# Patient Record
Sex: FEMALE | Race: BLACK OR AFRICAN AMERICAN | NOT HISPANIC OR LATINO | Employment: FULL TIME | URBAN - METROPOLITAN AREA
[De-identification: names, ages, dates, MRNs, and addresses within clinical notes are randomized per-mention and may not be internally consistent; named-entity substitution may affect disease eponyms.]

---

## 2023-03-13 DIAGNOSIS — J45.909 ASTHMA, UNSPECIFIED ASTHMA SEVERITY, UNSPECIFIED WHETHER COMPLICATED, UNSPECIFIED WHETHER PERSISTENT (HHS-HCC): Primary | ICD-10-CM

## 2023-03-13 RX ORDER — ALBUTEROL SULFATE 90 UG/1
2 AEROSOL, METERED RESPIRATORY (INHALATION)
COMMUNITY
Start: 2014-05-08 | End: 2023-03-13 | Stop reason: SDUPTHER

## 2023-03-13 RX ORDER — ALBUTEROL SULFATE 90 UG/1
2 AEROSOL, METERED RESPIRATORY (INHALATION)
Qty: 18 G | Refills: 0 | Status: SHIPPED | OUTPATIENT
Start: 2023-03-13 | End: 2023-04-26 | Stop reason: SDUPTHER

## 2023-03-14 DIAGNOSIS — I10 PRIMARY HYPERTENSION: Primary | ICD-10-CM

## 2023-03-14 PROBLEM — K57.92 ACUTE DIVERTICULITIS: Status: ACTIVE | Noted: 2023-03-14

## 2023-03-14 PROBLEM — R92.8 ABNORMAL MAMMOGRAM: Status: ACTIVE | Noted: 2023-03-14

## 2023-03-14 RX ORDER — LOSARTAN POTASSIUM 100 MG/1
100 TABLET ORAL DAILY
COMMUNITY
End: 2023-03-14 | Stop reason: SDUPTHER

## 2023-03-14 RX ORDER — LOSARTAN POTASSIUM 100 MG/1
100 TABLET ORAL DAILY
Qty: 90 TABLET | Refills: 0 | Status: SHIPPED | OUTPATIENT
Start: 2023-03-14 | End: 2023-06-23

## 2023-04-10 PROBLEM — M17.12 PRIMARY LOCALIZED OSTEOARTHROSIS OF LEFT LOWER LEG: Status: ACTIVE | Noted: 2023-04-10

## 2023-04-10 PROBLEM — H04.123 DRY EYES: Status: ACTIVE | Noted: 2023-04-10

## 2023-04-10 PROBLEM — G89.29 CHRONIC PAIN OF BOTH KNEES: Status: ACTIVE | Noted: 2023-04-10

## 2023-04-10 PROBLEM — E66.812 CLASS 2 SEVERE OBESITY WITH BODY MASS INDEX (BMI) OF 35 TO 39.9 WITH SERIOUS COMORBIDITY: Status: ACTIVE | Noted: 2023-04-10
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
## 2023-04-10 PROBLEM — R53.83 FATIGUE: Status: ACTIVE | Noted: 2023-04-10

## 2023-04-10 PROBLEM — K57.31 DIVERTICULOSIS OF LARGE INTESTINE WITH HEMORRHAGE: Status: ACTIVE | Noted: 2023-04-10

## 2023-04-10 PROBLEM — D64.9 ANEMIA: Status: ACTIVE | Noted: 2023-04-10

## 2023-04-10 PROBLEM — S05.02XA LEFT CORNEAL ABRASION: Status: ACTIVE | Noted: 2023-04-10

## 2023-04-10 PROBLEM — K92.2 LOWER GI BLEEDING: Status: ACTIVE | Noted: 2023-04-10

## 2023-04-10 PROBLEM — M25.562 CHRONIC PAIN OF BOTH KNEES: Status: ACTIVE | Noted: 2023-04-10

## 2023-04-10 PROBLEM — M25.561 CHRONIC PAIN OF BOTH KNEES: Status: ACTIVE | Noted: 2023-04-10

## 2023-04-10 PROBLEM — H40.009 GLAUCOMA SUSPECT: Status: ACTIVE | Noted: 2023-04-10
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
## 2023-04-10 PROBLEM — H26.9 CATARACT: Status: ACTIVE | Noted: 2023-04-10

## 2023-04-10 PROBLEM — R52 BODY ACHES: Status: ACTIVE | Noted: 2023-04-10

## 2023-04-10 PROBLEM — R05.9 COUGH: Status: ACTIVE | Noted: 2023-04-10

## 2023-04-10 PROBLEM — J45.909 ASTHMA (HHS-HCC): Status: ACTIVE | Noted: 2023-04-10

## 2023-04-10 PROBLEM — I10 HYPERTENSION: Status: ACTIVE | Noted: 2023-04-10

## 2023-04-10 PROBLEM — E66.01 CLASS 2 SEVERE OBESITY WITH BODY MASS INDEX (BMI) OF 35 TO 39.9 WITH SERIOUS COMORBIDITY (MULTI): Status: ACTIVE | Noted: 2023-04-10

## 2023-04-10 PROBLEM — H26.9 CATARACTS, BOTH EYES: Status: ACTIVE | Noted: 2023-04-10

## 2023-04-10 PROBLEM — E55.9 VITAMIN D DEFICIENCY: Status: ACTIVE | Noted: 2023-04-10

## 2023-04-10 PROBLEM — H18.899: Status: ACTIVE | Noted: 2023-04-10

## 2023-04-10 RX ORDER — IBUPROFEN 800 MG/1
1 TABLET ORAL 2 TIMES DAILY PRN
COMMUNITY
Start: 2022-09-20

## 2023-04-10 RX ORDER — IPRATROPIUM BROMIDE AND ALBUTEROL SULFATE 2.5; .5 MG/3ML; MG/3ML
SOLUTION RESPIRATORY (INHALATION)
COMMUNITY
Start: 2013-06-08 | End: 2023-10-13 | Stop reason: SDUPTHER

## 2023-04-10 RX ORDER — MONTELUKAST SODIUM 10 MG/1
10 TABLET ORAL NIGHTLY
COMMUNITY
Start: 2023-01-11 | End: 2023-10-13 | Stop reason: SDUPTHER

## 2023-04-10 RX ORDER — DEXTROMETHORPHAN HYDROBROMIDE, GUAIFENESIN 5; 100 MG/5ML; MG/5ML
1300 LIQUID ORAL 2 TIMES DAILY PRN
COMMUNITY
Start: 2020-09-15 | End: 2023-11-17 | Stop reason: ALTCHOICE

## 2023-04-10 RX ORDER — BUDESONIDE, GLYCOPYRROLATE, AND FORMOTEROL FUMARATE 160; 9; 4.8 UG/1; UG/1; UG/1
2 AEROSOL, METERED RESPIRATORY (INHALATION) 2 TIMES DAILY
COMMUNITY
Start: 2022-08-23 | End: 2023-04-11 | Stop reason: ALTCHOICE

## 2023-04-10 RX ORDER — AMLODIPINE BESYLATE 5 MG/1
1 TABLET ORAL
COMMUNITY
Start: 2020-07-14 | End: 2023-10-13 | Stop reason: SDUPTHER

## 2023-04-10 RX ORDER — CALCIUM CARBONATE 300MG(750)
400 TABLET,CHEWABLE ORAL AS NEEDED
COMMUNITY
Start: 2023-01-11 | End: 2023-04-11 | Stop reason: ALTCHOICE

## 2023-04-10 RX ORDER — METOPROLOL TARTRATE 50 MG/1
1 TABLET ORAL 2 TIMES DAILY
COMMUNITY
Start: 2019-11-07 | End: 2023-10-13 | Stop reason: SDUPTHER

## 2023-04-10 RX ORDER — LATANOPROST 50 UG/ML
1 SOLUTION/ DROPS OPHTHALMIC NIGHTLY
COMMUNITY
Start: 2018-08-20 | End: 2023-10-09 | Stop reason: SDUPTHER

## 2023-04-10 RX ORDER — MULTIVITAMIN
1 TABLET ORAL
COMMUNITY

## 2023-04-11 ENCOUNTER — OFFICE VISIT (OUTPATIENT)
Dept: PRIMARY CARE | Facility: CLINIC | Age: 68
End: 2023-04-11
Payer: MEDICARE

## 2023-04-11 ENCOUNTER — LAB (OUTPATIENT)
Dept: LAB | Facility: LAB | Age: 68
End: 2023-04-11
Payer: MEDICARE

## 2023-04-11 VITALS
HEIGHT: 64 IN | DIASTOLIC BLOOD PRESSURE: 80 MMHG | WEIGHT: 220 LBS | SYSTOLIC BLOOD PRESSURE: 120 MMHG | BODY MASS INDEX: 37.56 KG/M2 | HEART RATE: 81 BPM | OXYGEN SATURATION: 96 %

## 2023-04-11 DIAGNOSIS — I10 PRIMARY HYPERTENSION: ICD-10-CM

## 2023-04-11 DIAGNOSIS — Z01.419 WOMEN'S ANNUAL ROUTINE GYNECOLOGICAL EXAMINATION: ICD-10-CM

## 2023-04-11 DIAGNOSIS — Z12.31 ENCOUNTER FOR SCREENING MAMMOGRAM FOR BREAST CANCER: ICD-10-CM

## 2023-04-11 DIAGNOSIS — E55.9 VITAMIN D DEFICIENCY: ICD-10-CM

## 2023-04-11 DIAGNOSIS — J45.909 ASTHMA, UNSPECIFIED ASTHMA SEVERITY, UNSPECIFIED WHETHER COMPLICATED, UNSPECIFIED WHETHER PERSISTENT (HHS-HCC): ICD-10-CM

## 2023-04-11 DIAGNOSIS — E66.01 CLASS 2 SEVERE OBESITY WITH BODY MASS INDEX (BMI) OF 35 TO 39.9 WITH SERIOUS COMORBIDITY (MULTI): ICD-10-CM

## 2023-04-11 DIAGNOSIS — Z78.0 POSTMENOPAUSAL STATE: Primary | ICD-10-CM

## 2023-04-11 DIAGNOSIS — R92.8 ABNORMAL MAMMOGRAM OF LEFT BREAST: ICD-10-CM

## 2023-04-11 PROBLEM — R52 BODY ACHES: Status: RESOLVED | Noted: 2023-04-10 | Resolved: 2023-04-11
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
## 2023-04-11 PROBLEM — K57.92 ACUTE DIVERTICULITIS: Status: RESOLVED | Noted: 2023-03-14 | Resolved: 2023-04-11

## 2023-04-11 PROBLEM — K92.2 LOWER GI BLEEDING: Status: RESOLVED | Noted: 2023-04-10 | Resolved: 2023-04-11

## 2023-04-11 LAB
ALANINE AMINOTRANSFERASE (SGPT) (U/L) IN SER/PLAS: 14 U/L (ref 7–45)
ALBUMIN (G/DL) IN SER/PLAS: 3.9 G/DL (ref 3.4–5)
ALKALINE PHOSPHATASE (U/L) IN SER/PLAS: 83 U/L (ref 33–136)
ANION GAP IN SER/PLAS: 12 MMOL/L (ref 10–20)
ASPARTATE AMINOTRANSFERASE (SGOT) (U/L) IN SER/PLAS: 17 U/L (ref 9–39)
BILIRUBIN TOTAL (MG/DL) IN SER/PLAS: 0.4 MG/DL (ref 0–1.2)
CALCIDIOL (25 OH VITAMIN D3) (NG/ML) IN SER/PLAS: 23 NG/ML
CALCIUM (MG/DL) IN SER/PLAS: 9.8 MG/DL (ref 8.6–10.6)
CARBON DIOXIDE, TOTAL (MMOL/L) IN SER/PLAS: 31 MMOL/L (ref 21–32)
CHLORIDE (MMOL/L) IN SER/PLAS: 107 MMOL/L (ref 98–107)
CHOLESTEROL (MG/DL) IN SER/PLAS: 188 MG/DL (ref 0–199)
CHOLESTEROL IN HDL (MG/DL) IN SER/PLAS: 67.9 MG/DL
CHOLESTEROL/HDL RATIO: 2.8
CREATININE (MG/DL) IN SER/PLAS: 0.72 MG/DL (ref 0.5–1.05)
GFR FEMALE: >90 ML/MIN/1.73M2
GLUCOSE (MG/DL) IN SER/PLAS: 105 MG/DL (ref 74–99)
LDL: 107 MG/DL (ref 0–99)
POTASSIUM (MMOL/L) IN SER/PLAS: 4.7 MMOL/L (ref 3.5–5.3)
PROTEIN TOTAL: 6.7 G/DL (ref 6.4–8.2)
SODIUM (MMOL/L) IN SER/PLAS: 145 MMOL/L (ref 136–145)
TRIGLYCERIDE (MG/DL) IN SER/PLAS: 65 MG/DL (ref 0–149)
UREA NITROGEN (MG/DL) IN SER/PLAS: 15 MG/DL (ref 6–23)
VLDL: 13 MG/DL (ref 0–40)

## 2023-04-11 PROCEDURE — 80061 LIPID PANEL: CPT

## 2023-04-11 PROCEDURE — 1036F TOBACCO NON-USER: CPT | Performed by: PHYSICIAN ASSISTANT

## 2023-04-11 PROCEDURE — 1160F RVW MEDS BY RX/DR IN RCRD: CPT | Performed by: PHYSICIAN ASSISTANT

## 2023-04-11 PROCEDURE — 82306 VITAMIN D 25 HYDROXY: CPT

## 2023-04-11 PROCEDURE — 3074F SYST BP LT 130 MM HG: CPT | Performed by: PHYSICIAN ASSISTANT

## 2023-04-11 PROCEDURE — 3079F DIAST BP 80-89 MM HG: CPT | Performed by: PHYSICIAN ASSISTANT

## 2023-04-11 PROCEDURE — 99213 OFFICE O/P EST LOW 20 MIN: CPT | Performed by: PHYSICIAN ASSISTANT

## 2023-04-11 PROCEDURE — 1159F MED LIST DOCD IN RCRD: CPT | Performed by: PHYSICIAN ASSISTANT

## 2023-04-11 PROCEDURE — 80053 COMPREHEN METABOLIC PANEL: CPT

## 2023-04-11 PROCEDURE — 36415 COLL VENOUS BLD VENIPUNCTURE: CPT

## 2023-04-11 RX ORDER — FLUTICASONE FUROATE, UMECLIDINIUM BROMIDE AND VILANTEROL TRIFENATATE 100; 62.5; 25 UG/1; UG/1; UG/1
1 POWDER RESPIRATORY (INHALATION) DAILY
Qty: 60 EACH | Refills: 0 | Status: SHIPPED | OUTPATIENT
Start: 2023-04-11 | End: 2023-05-04

## 2023-04-11 RX ORDER — SPIRONOLACTONE 25 MG
1 TABLET ORAL
COMMUNITY
Start: 2022-02-28

## 2023-04-11 ASSESSMENT — PATIENT HEALTH QUESTIONNAIRE - PHQ9
1. LITTLE INTEREST OR PLEASURE IN DOING THINGS: NOT AT ALL
SUM OF ALL RESPONSES TO PHQ9 QUESTIONS 1 AND 2: 0
2. FEELING DOWN, DEPRESSED OR HOPELESS: NOT AT ALL

## 2023-04-11 NOTE — PROGRESS NOTES
"Subjective   Betty Mendieta is a 67 y.o. female who presents for Follow-up (Needs referral for Mamm/PAP).  HPI  67-year-old female presenting for follow-up.  Overall doing well.    HTN: On losartan 100 mg, metoprolol tartrate 50 mg, amlodipine 5 mg.  She does not check BP at home.  Denies any headache, dizziness, chest pain, SOB/GRAY, LE edema    Asthma: On ipratropium-albuterol nebulizer solution, albuterol inhaler as needed, Breztri inhaler 2 puffs twice daily, montelukast 10 mg daily. She is doing nebulizer 3x daily currently. Denies any current wheezing, cough, sputum production, SOB/GRAY.    Health maintenance:  Immunizations:  -Pneumococcal: UTD  -Shingrix: Recommended, obtain from pharmacy due  Mammogram due (last 1/20/2022, was advised to have 6-month follow-up however does not appear it was ever completed)-ordered annual screening and left breast diagnostic + US  Colon CA screening UTD (last 1/21/2020) -10 years  DEXA due (none prior)- ordered 4/11/23  PAP-not indicated due to age, referred to GYN for annual exam  CT cardiac scoring due (none prior)- ordered 4/11/23    Last MCR / CPE: 9/20/2022 (MCR ADV)    /80   Pulse 81   Ht 1.626 m (5' 4\")   Wt 99.8 kg (220 lb)   SpO2 96%   BMI 37.76 kg/m²   Objective   Physical Exam  Vitals reviewed.   Constitutional:       General: She is not in acute distress.     Appearance: Normal appearance. She is not ill-appearing.   HENT:      Head: Normocephalic and atraumatic.   Eyes:      General: No scleral icterus.     Extraocular Movements: Extraocular movements intact.      Conjunctiva/sclera: Conjunctivae normal.      Pupils: Pupils are equal, round, and reactive to light.   Cardiovascular:      Rate and Rhythm: Normal rate and regular rhythm.      Heart sounds: Normal heart sounds. No murmur heard.     No friction rub. No gallop.   Pulmonary:      Effort: Pulmonary effort is normal. No respiratory distress.      Breath sounds: Normal breath sounds. No stridor. " No wheezing, rhonchi or rales.   Musculoskeletal:      Cervical back: Normal range of motion.      Right lower leg: No edema.      Left lower leg: No edema.   Skin:     General: Skin is warm and dry.   Neurological:      Mental Status: She is alert and oriented to person, place, and time. Mental status is at baseline.      Cranial Nerves: No cranial nerve deficit.      Gait: Gait normal.   Psychiatric:         Mood and Affect: Mood normal.         Behavior: Behavior normal.         Assessment/Plan   Problem List Items Addressed This Visit       Asthma     Uncontrolled on DuoNeb nebulizer solution and albuterol inhaler as needed.  She was previously given samples of Breztri by last PCP, however prescription was not covered by insurance.  We will try to get Trelegy covered by insurance.  If not successful, recommend referral to pulmonary medicine         Relevant Medications    fluticasone-umeclidin-vilanter (Trelegy Ellipta) 100-62.5-25 mcg blister with device    Hypertension     Well-controlled.  Continue Amlodipine 5mg, metoprolol tartrate 50mg, Losartan 100mg. Follow strict DASH diet. Exercise for 30 mins daily, as tolerated. CT cardiac scoring ordered          Relevant Orders    CT cardiac scoring wo IV contrast    Comprehensive metabolic panel    Lipid panel    Vitamin D deficiency    Relevant Orders    Vitamin D 25-Hydroxy,Total    Class 2 severe obesity with body mass index (BMI) of 35 to 39.9 with serious comorbidity (CMS/HCC)     Recommended heart healthy, mediterranean style diet. Exercise for at least 30 mins daily, as tolerated. Can refer to comprehensive weight loss clinic if interested.           Other Visit Diagnoses       Postmenopausal state    -  Primary    Relevant Orders    XR DEXA bone density    Referral to Gynecology    Women's annual routine gynecological examination        Relevant Orders    Referral to Gynecology    Encounter for screening mammogram for breast cancer        Relevant Orders     BI mammo bilateral screening tomosynthesis    Abnormal mammogram of left breast        Relevant Orders    BI US breast limited left    BI mammo left diagnostic

## 2023-04-11 NOTE — ASSESSMENT & PLAN NOTE
Well-controlled.  Continue Amlodipine 5mg, metoprolol tartrate 50mg, Losartan 100mg. Follow strict DASH diet. Exercise for 30 mins daily, as tolerated. CT cardiac scoring ordered

## 2023-04-11 NOTE — ASSESSMENT & PLAN NOTE
Recommended heart healthy, mediterranean style diet. Exercise for at least 30 mins daily, as tolerated. Can refer to comprehensive weight loss clinic if interested.

## 2023-04-11 NOTE — ASSESSMENT & PLAN NOTE
Uncontrolled on DuoNeb nebulizer solution and albuterol inhaler as needed.  She was previously given samples of Breztri by last PCP, however prescription was not covered by insurance.  We will try to get Trelegy covered by insurance.  If not successful, recommend referral to pulmonary medicine

## 2023-04-13 ENCOUNTER — TELEPHONE (OUTPATIENT)
Dept: PRIMARY CARE | Facility: CLINIC | Age: 68
End: 2023-04-13
Payer: MEDICARE

## 2023-04-13 NOTE — RESULT ENCOUNTER NOTE
Lab results are back.    Lipid panel shows elevated LDL (bad cholesterol.  Our goal is <100 and her level was 107.  Cut back on saturated fats and carbohydrates.  Increase exercise level as tolerated.    Vitamin D level slightly low.  Begin taking over-the-counter vitamin D3 2000 units daily with food.    Electrolytes, kidney function, liver function all look good

## 2023-04-13 NOTE — TELEPHONE ENCOUNTER
Pt informed of results.    ----- Message from Dariana Laird PA-C sent at 4/12/2023  9:44 PM EDT -----  Lab results are back.    Lipid panel shows elevated LDL (bad cholesterol.  Our goal is <100 and her level was 107.  Cut back on saturated fats and carbohydrates.  Increase exercise level as tolerated.    Vitamin D level slightly low.  Begin taking over-the-counter vitamin D3 2000 units daily with food.    Electrolytes, kidney function, liver function all look good

## 2023-04-26 DIAGNOSIS — J45.909 ASTHMA, UNSPECIFIED ASTHMA SEVERITY, UNSPECIFIED WHETHER COMPLICATED, UNSPECIFIED WHETHER PERSISTENT (HHS-HCC): ICD-10-CM

## 2023-04-26 RX ORDER — ALBUTEROL SULFATE 90 UG/1
2 AEROSOL, METERED RESPIRATORY (INHALATION)
Qty: 18 G | Refills: 3 | Status: SHIPPED | OUTPATIENT
Start: 2023-04-26 | End: 2023-10-13 | Stop reason: SDUPTHER

## 2023-05-03 DIAGNOSIS — J45.909 ASTHMA, UNSPECIFIED ASTHMA SEVERITY, UNSPECIFIED WHETHER COMPLICATED, UNSPECIFIED WHETHER PERSISTENT (HHS-HCC): ICD-10-CM

## 2023-05-04 RX ORDER — FLUTICASONE FUROATE, UMECLIDINIUM BROMIDE AND VILANTEROL TRIFENATATE 100; 62.5; 25 UG/1; UG/1; UG/1
POWDER RESPIRATORY (INHALATION)
Qty: 60 EACH | Refills: 0 | Status: SHIPPED | OUTPATIENT
Start: 2023-05-04 | End: 2023-05-08 | Stop reason: SDUPTHER

## 2023-05-08 DIAGNOSIS — J45.909 ASTHMA, UNSPECIFIED ASTHMA SEVERITY, UNSPECIFIED WHETHER COMPLICATED, UNSPECIFIED WHETHER PERSISTENT (HHS-HCC): ICD-10-CM

## 2023-05-08 RX ORDER — FLUTICASONE FUROATE, UMECLIDINIUM BROMIDE AND VILANTEROL TRIFENATATE 100; 62.5; 25 UG/1; UG/1; UG/1
1 POWDER RESPIRATORY (INHALATION) DAILY
Qty: 60 EACH | Refills: 0 | Status: SHIPPED | OUTPATIENT
Start: 2023-05-08 | End: 2023-06-26 | Stop reason: SDUPTHER

## 2023-06-22 DIAGNOSIS — I10 PRIMARY HYPERTENSION: ICD-10-CM

## 2023-06-23 RX ORDER — LOSARTAN POTASSIUM 100 MG/1
TABLET ORAL
Qty: 90 TABLET | Refills: 1 | Status: SHIPPED | OUTPATIENT
Start: 2023-06-23 | End: 2023-10-13 | Stop reason: SDUPTHER

## 2023-06-26 DIAGNOSIS — J45.909 ASTHMA, UNSPECIFIED ASTHMA SEVERITY, UNSPECIFIED WHETHER COMPLICATED, UNSPECIFIED WHETHER PERSISTENT (HHS-HCC): ICD-10-CM

## 2023-06-26 RX ORDER — FLUTICASONE FUROATE, UMECLIDINIUM BROMIDE AND VILANTEROL TRIFENATATE 100; 62.5; 25 UG/1; UG/1; UG/1
1 POWDER RESPIRATORY (INHALATION) DAILY
Qty: 60 EACH | Refills: 3 | Status: SHIPPED | OUTPATIENT
Start: 2023-06-26 | End: 2023-08-28 | Stop reason: ALTCHOICE

## 2023-08-28 ENCOUNTER — OFFICE VISIT (OUTPATIENT)
Dept: PRIMARY CARE | Facility: CLINIC | Age: 68
End: 2023-08-28
Payer: MEDICARE

## 2023-08-28 VITALS
OXYGEN SATURATION: 96 % | BODY MASS INDEX: 37.76 KG/M2 | SYSTOLIC BLOOD PRESSURE: 121 MMHG | HEART RATE: 77 BPM | DIASTOLIC BLOOD PRESSURE: 71 MMHG | WEIGHT: 220 LBS

## 2023-08-28 DIAGNOSIS — J45.909 ASTHMA, UNSPECIFIED ASTHMA SEVERITY, UNSPECIFIED WHETHER COMPLICATED, UNSPECIFIED WHETHER PERSISTENT (HHS-HCC): Primary | ICD-10-CM

## 2023-08-28 PROBLEM — R52 BODY ACHES: Status: ACTIVE | Noted: 2023-08-28
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
## 2023-08-28 PROBLEM — M17.12 PRIMARY OSTEOARTHRITIS OF LEFT KNEE: Status: ACTIVE | Noted: 2021-11-22

## 2023-08-28 PROBLEM — K21.9 GASTROESOPHAGEAL REFLUX DISEASE: Status: ACTIVE | Noted: 2022-02-10

## 2023-08-28 PROBLEM — J45.20 MILD INTERMITTENT ASTHMA WITHOUT COMPLICATION (HHS-HCC): Chronic | Status: ACTIVE | Noted: 2020-03-05

## 2023-08-28 PROBLEM — H52.03 HYPEROPIA OF BOTH EYES: Status: ACTIVE | Noted: 2023-08-28

## 2023-08-28 PROBLEM — M25.512 ACUTE PAIN OF LEFT SHOULDER: Status: ACTIVE | Noted: 2023-08-28

## 2023-08-28 PROBLEM — H40.053 BILATERAL OCULAR HYPERTENSION: Status: ACTIVE | Noted: 2023-08-28

## 2023-08-28 PROBLEM — Z86.16 HISTORY OF COVID-19: Status: ACTIVE | Noted: 2022-02-10

## 2023-08-28 PROBLEM — J01.90 ACUTE SINUSITIS: Status: ACTIVE | Noted: 2023-08-28

## 2023-08-28 PROBLEM — H52.4 BILATERAL PRESBYOPIA: Status: ACTIVE | Noted: 2023-08-28

## 2023-08-28 PROBLEM — K92.2 LOWER GI BLEEDING: Status: ACTIVE | Noted: 2023-08-28

## 2023-08-28 PROBLEM — J01.90 ACUTE SINUSITIS: Status: RESOLVED | Noted: 2023-08-28 | Resolved: 2023-08-28

## 2023-08-28 PROBLEM — M25.562 CHRONIC PAIN OF LEFT KNEE: Status: ACTIVE | Noted: 2022-03-29

## 2023-08-28 PROBLEM — G89.29 CHRONIC PAIN OF LEFT KNEE: Status: ACTIVE | Noted: 2022-03-29

## 2023-08-28 PROBLEM — J45.40 MODERATE PERSISTENT ASTHMA WITHOUT COMPLICATION (HHS-HCC): Status: ACTIVE | Noted: 2022-02-10

## 2023-08-28 PROBLEM — K57.92 ACUTE DIVERTICULITIS: Status: ACTIVE | Noted: 2023-08-28

## 2023-08-28 PROBLEM — I10 ESSENTIAL HYPERTENSION: Chronic | Status: ACTIVE | Noted: 2020-03-05
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
## 2023-08-28 PROBLEM — Z96.652 STATUS POST LEFT KNEE REPLACEMENT: Status: ACTIVE | Noted: 2022-04-08

## 2023-08-28 PROBLEM — H25.13 AGE-RELATED NUCLEAR CATARACT OF BOTH EYES: Status: ACTIVE | Noted: 2023-08-28

## 2023-08-28 PROCEDURE — 3078F DIAST BP <80 MM HG: CPT | Performed by: PHYSICIAN ASSISTANT

## 2023-08-28 PROCEDURE — 3074F SYST BP LT 130 MM HG: CPT | Performed by: PHYSICIAN ASSISTANT

## 2023-08-28 PROCEDURE — 1160F RVW MEDS BY RX/DR IN RCRD: CPT | Performed by: PHYSICIAN ASSISTANT

## 2023-08-28 PROCEDURE — 1036F TOBACCO NON-USER: CPT | Performed by: PHYSICIAN ASSISTANT

## 2023-08-28 PROCEDURE — 99213 OFFICE O/P EST LOW 20 MIN: CPT | Performed by: PHYSICIAN ASSISTANT

## 2023-08-28 PROCEDURE — 1159F MED LIST DOCD IN RCRD: CPT | Performed by: PHYSICIAN ASSISTANT

## 2023-08-28 RX ORDER — METAXALONE 800 MG/1
TABLET ORAL
COMMUNITY
End: 2023-10-13 | Stop reason: ALTCHOICE

## 2023-08-28 RX ORDER — BUDESONIDE, GLYCOPYRROLATE, AND FORMOTEROL FUMARATE 160; 9; 4.8 UG/1; UG/1; UG/1
2 AEROSOL, METERED RESPIRATORY (INHALATION) 2 TIMES DAILY
COMMUNITY
Start: 2022-08-23 | End: 2023-08-28

## 2023-08-28 RX ORDER — FLUTICASONE FUROATE, UMECLIDINIUM BROMIDE AND VILANTEROL TRIFENATATE 200; 62.5; 25 UG/1; UG/1; UG/1
1 POWDER RESPIRATORY (INHALATION)
Qty: 60 EACH | Refills: 5 | Status: SHIPPED | OUTPATIENT
Start: 2023-08-28

## 2023-08-28 RX ORDER — PREDNISONE 10 MG/1
TABLET ORAL
Qty: 1 EACH | Refills: 0 | Status: SHIPPED | OUTPATIENT
Start: 2023-08-28 | End: 2023-10-10 | Stop reason: ALTCHOICE

## 2023-08-28 RX ORDER — CALCIUM CARBONATE 300MG(750)
TABLET,CHEWABLE ORAL
COMMUNITY
Start: 2023-01-11 | End: 2023-11-17 | Stop reason: ALTCHOICE

## 2023-08-28 RX ORDER — MOXIFLOXACIN 5 MG/ML
1 SOLUTION/ DROPS OPHTHALMIC
COMMUNITY
Start: 2023-06-23 | End: 2023-11-17 | Stop reason: ALTCHOICE

## 2023-08-28 RX ORDER — METOPROLOL SUCCINATE 25 MG/1
1 TABLET, EXTENDED RELEASE ORAL DAILY
COMMUNITY
End: 2023-08-28 | Stop reason: ALTCHOICE

## 2023-08-28 RX ORDER — PREDNISONE 10 MG/1
TABLET ORAL
COMMUNITY
Start: 2023-01-11 | End: 2023-08-28 | Stop reason: ALTCHOICE

## 2023-08-28 NOTE — ASSESSMENT & PLAN NOTE
With acute exacerbation. Begin prednisone 10mg dose pack. Take with food and take as directed. Increase Trelegy to 200-62.5-25mg with hopes of decreasing back to 100-62.5-25mg in the future once controlled. Continue montelukast 10mg, ipratropium-albuterol nebulizer, and albuterol inhaler. Referral to pulmonary medicine placed.

## 2023-08-28 NOTE — PROGRESS NOTES
Subjective   Betty Mendieta is a 68 y.o. female who presents for Asthma.  HPI Betty Mendieta is a 68 y.o. female presenting for an acute visit with c/o asthma exacerbation.   She is currently taking ipratropium-albuterol nebulizer treatments, albuterol and Trelegy 100-62.5-25mg inhalers, and montelukast 10mg. She states her asthma was well controlled, but she missed a few days of the Trelegy and now she has been experiencing wheezing, dry hacking cough, chest tightness, and SOB/GRAY. She is using her nebulizer 3x/day.    12 point ROS reviewed and negative other than as stated in HPI    /71   Pulse 77   Wt 99.8 kg (220 lb)   SpO2 96%   BMI 37.76 kg/m²   Objective   Physical Exam  Vitals reviewed.   Constitutional:       General: She is not in acute distress.     Appearance: Normal appearance. She is not ill-appearing.   HENT:      Head: Normocephalic and atraumatic.   Eyes:      General: No scleral icterus.     Extraocular Movements: Extraocular movements intact.      Conjunctiva/sclera: Conjunctivae normal.      Pupils: Pupils are equal, round, and reactive to light.   Cardiovascular:      Rate and Rhythm: Normal rate and regular rhythm.      Heart sounds: Normal heart sounds. No murmur heard.     No friction rub. No gallop.   Pulmonary:      Effort: Pulmonary effort is normal. No respiratory distress.      Breath sounds: Normal breath sounds. No stridor. No wheezing, rhonchi or rales.   Musculoskeletal:      Cervical back: Normal range of motion.      Right lower leg: No edema.      Left lower leg: No edema.   Skin:     General: Skin is warm and dry.   Neurological:      Mental Status: She is alert and oriented to person, place, and time. Mental status is at baseline.      Cranial Nerves: No cranial nerve deficit.      Gait: Gait normal.   Psychiatric:         Mood and Affect: Mood normal.         Behavior: Behavior normal.         Assessment/Plan   Problem List Items Addressed This Visit       Asthma -  Primary     With acute exacerbation. Begin prednisone 10mg dose pack. Take with food and take as directed. Increase Trelegy to 200-62.5-25mg with hopes of decreasing back to 100-62.5-25mg in the future once controlled. Continue montelukast 10mg, ipratropium-albuterol nebulizer, and albuterol inhaler. Referral to pulmonary medicine placed.          Relevant Medications    fluticasone-umeclidin-vilanter (Trelegy Ellipta) 200-62.5-25 mcg blister with device    predniSONE 10 mg tablets,dose pack    Other Relevant Orders    Referral to Pulmonology        Follow up as scheduled or sooner as needed

## 2023-10-04 DIAGNOSIS — R92.8 ABNORMAL MAMMOGRAM: Primary | ICD-10-CM

## 2023-10-04 NOTE — RESULT ENCOUNTER NOTE
Mammogram is back. The left breast shows a change in the mass that was previously seen. The right breast is concerning due to the nipple discharge. It is recommend that an ultrasound of both breasts be performed, which I will place the order for. They should call to schedule

## 2023-10-05 NOTE — RESULT ENCOUNTER NOTE
DEXA bone density shows osteopenia (weakening of the bones) but not to the extent of osteoporosis. I recommend taking calcium 600mg + vitamin D 400U twice daily. Participate in weightbearing exercises as tolerated

## 2023-10-09 DIAGNOSIS — H40.059 OCULAR HYPERTENSION, UNSPECIFIED LATERALITY: Primary | ICD-10-CM

## 2023-10-10 ENCOUNTER — OFFICE VISIT (OUTPATIENT)
Dept: PRIMARY CARE | Facility: CLINIC | Age: 68
End: 2023-10-10
Payer: MEDICARE

## 2023-10-10 VITALS
WEIGHT: 222 LBS | HEIGHT: 63 IN | DIASTOLIC BLOOD PRESSURE: 75 MMHG | SYSTOLIC BLOOD PRESSURE: 130 MMHG | OXYGEN SATURATION: 95 % | BODY MASS INDEX: 39.34 KG/M2 | HEART RATE: 88 BPM

## 2023-10-10 DIAGNOSIS — I10 PRIMARY HYPERTENSION: ICD-10-CM

## 2023-10-10 DIAGNOSIS — Z00.00 ROUTINE GENERAL MEDICAL EXAMINATION AT A HEALTH CARE FACILITY: ICD-10-CM

## 2023-10-10 DIAGNOSIS — J45.909 ASTHMA, UNSPECIFIED ASTHMA SEVERITY, UNSPECIFIED WHETHER COMPLICATED, UNSPECIFIED WHETHER PERSISTENT (HHS-HCC): ICD-10-CM

## 2023-10-10 DIAGNOSIS — Z00.00 MEDICARE ANNUAL WELLNESS VISIT, SUBSEQUENT: Primary | ICD-10-CM

## 2023-10-10 DIAGNOSIS — Z13.89 ENCOUNTER FOR SCREENING FOR OTHER DISORDER: ICD-10-CM

## 2023-10-10 PROCEDURE — G0444 DEPRESSION SCREEN ANNUAL: HCPCS | Performed by: PHYSICIAN ASSISTANT

## 2023-10-10 PROCEDURE — 1160F RVW MEDS BY RX/DR IN RCRD: CPT | Performed by: PHYSICIAN ASSISTANT

## 2023-10-10 PROCEDURE — 3078F DIAST BP <80 MM HG: CPT | Performed by: PHYSICIAN ASSISTANT

## 2023-10-10 PROCEDURE — 1036F TOBACCO NON-USER: CPT | Performed by: PHYSICIAN ASSISTANT

## 2023-10-10 PROCEDURE — 90677 PCV20 VACCINE IM: CPT | Performed by: PHYSICIAN ASSISTANT

## 2023-10-10 PROCEDURE — 1170F FXNL STATUS ASSESSED: CPT | Performed by: PHYSICIAN ASSISTANT

## 2023-10-10 PROCEDURE — 99397 PER PM REEVAL EST PAT 65+ YR: CPT | Performed by: PHYSICIAN ASSISTANT

## 2023-10-10 PROCEDURE — 1159F MED LIST DOCD IN RCRD: CPT | Performed by: PHYSICIAN ASSISTANT

## 2023-10-10 PROCEDURE — 1033F TOBACCO NONSMOKER NOR 2NDHND: CPT | Performed by: PHYSICIAN ASSISTANT

## 2023-10-10 PROCEDURE — G0008 ADMIN INFLUENZA VIRUS VAC: HCPCS | Performed by: PHYSICIAN ASSISTANT

## 2023-10-10 PROCEDURE — 3075F SYST BP GE 130 - 139MM HG: CPT | Performed by: PHYSICIAN ASSISTANT

## 2023-10-10 PROCEDURE — G0439 PPPS, SUBSEQ VISIT: HCPCS | Performed by: PHYSICIAN ASSISTANT

## 2023-10-10 PROCEDURE — G0009 ADMIN PNEUMOCOCCAL VACCINE: HCPCS | Performed by: PHYSICIAN ASSISTANT

## 2023-10-10 PROCEDURE — 1124F ACP DISCUSS-NO DSCNMKR DOCD: CPT | Performed by: PHYSICIAN ASSISTANT

## 2023-10-10 PROCEDURE — 90662 IIV NO PRSV INCREASED AG IM: CPT | Performed by: PHYSICIAN ASSISTANT

## 2023-10-10 RX ORDER — LATANOPROST 50 UG/ML
1 SOLUTION/ DROPS OPHTHALMIC NIGHTLY
Qty: 2.5 ML | Refills: 3 | Status: SHIPPED | OUTPATIENT
Start: 2023-10-10 | End: 2024-03-11

## 2023-10-10 ASSESSMENT — ACTIVITIES OF DAILY LIVING (ADL)
MANAGING_FINANCES: INDEPENDENT
TAKING_MEDICATION: INDEPENDENT
DRESSING: INDEPENDENT
BATHING: INDEPENDENT
GROCERY_SHOPPING: INDEPENDENT
DOING_HOUSEWORK: INDEPENDENT

## 2023-10-10 ASSESSMENT — ENCOUNTER SYMPTOMS
OCCASIONAL FEELINGS OF UNSTEADINESS: 0
LOSS OF SENSATION IN FEET: 0
DEPRESSION: 0

## 2023-10-10 ASSESSMENT — PATIENT HEALTH QUESTIONNAIRE - PHQ9
SUM OF ALL RESPONSES TO PHQ9 QUESTIONS 1 AND 2: 0
1. LITTLE INTEREST OR PLEASURE IN DOING THINGS: NOT AT ALL
2. FEELING DOWN, DEPRESSED OR HOPELESS: NOT AT ALL

## 2023-10-10 NOTE — PROGRESS NOTES
"Subjective   Reason for Visit: Betty Mendieta is an 68 y.o. female here for a Medicare Wellness visit. Overall doing well.     Past Medical, Surgical, and Family History reviewed and updated in chart.    Reviewed all medications by prescribing practitioner or clinical pharmacist (such as prescriptions, OTCs, herbal therapies and supplements) and documented in the medical record.    HTN: On losartan 100 mg, metoprolol tartrate 50 mg, amlodipine 5 mg.  She does not check BP at home.  Denies any headache, dizziness, chest pain, SOB/GRAY, LE edema     Asthma: compliant with trelegy 200-62.5-25mg, albuterol inhaler as needed, and montelukast 10mg.  Per patient, her asthma has been under much better control since the initiation of the Trelegy.  She has not had any recent exacerbations.    12 point ROS reviewed and negative other than as stated in HPI    Health maintenance:  Immunizations:  -Flu: Obtain high-dose flu today, 10/10/2023  -Pneumococcal: previously received pneumovax, obtain Prevnar 20 today, 10/10/2023  -Shingrix: Recommended from pharmacy  -Tdap: recommended from pharmacy   Mammogram UTD (last 9/26/23) - need US of b/l breasts, ordered 10/4/2023  Colon CA screening UTD (last 1/21/20) - 10yrs  DEXA UTD (last 9/25/23)   PAP - not indicated   CT cardiac scoring UTD (last 4/11/23)  No healthcare POA/living will    Last MCR / CPE: 10/10/23 (MCR ADV)     12 point ROS reviewed and negative other than as stated in HPI    Patient Care Team:  Dariana Laird PA-C as PCP - General (Internal Medicine)  Dariana Laird PA-C as PCP - [employer] PCP     Objective   Vitals:  /75   Pulse 88   Ht 1.6 m (5' 3\")   Wt 101 kg (222 lb)   SpO2 95%   BMI 39.33 kg/m²       Physical Exam  Vitals reviewed.   Constitutional:       General: She is not in acute distress.     Appearance: Normal appearance.   HENT:      Head: Normocephalic and atraumatic.      Right Ear: Tympanic membrane, ear canal and external ear " normal. There is no impacted cerumen.      Left Ear: Tympanic membrane, ear canal and external ear normal. There is no impacted cerumen.      Nose: Nose normal. No congestion or rhinorrhea.      Mouth/Throat:      Mouth: Mucous membranes are moist.      Pharynx: Oropharynx is clear. No oropharyngeal exudate or posterior oropharyngeal erythema.   Eyes:      General: No scleral icterus.        Right eye: No discharge.         Left eye: No discharge.      Extraocular Movements: Extraocular movements intact.      Conjunctiva/sclera: Conjunctivae normal.      Pupils: Pupils are equal, round, and reactive to light.   Cardiovascular:      Rate and Rhythm: Normal rate and regular rhythm.      Heart sounds: Normal heart sounds. No murmur heard.     No friction rub. No gallop.   Pulmonary:      Effort: Pulmonary effort is normal. No respiratory distress.      Breath sounds: Normal breath sounds. No stridor. No wheezing, rhonchi or rales.   Abdominal:      General: Bowel sounds are normal. There is no distension.      Palpations: Abdomen is soft. There is no mass.      Tenderness: There is no abdominal tenderness. There is no right CVA tenderness or left CVA tenderness.   Musculoskeletal:         General: Normal range of motion.      Cervical back: Normal range of motion and neck supple.      Right lower leg: No edema.      Left lower leg: No edema.   Skin:     General: Skin is warm and dry.      Findings: No rash.   Neurological:      General: No focal deficit present.      Mental Status: She is alert and oriented to person, place, and time. Mental status is at baseline.      Cranial Nerves: No cranial nerve deficit.      Gait: Gait normal.   Psychiatric:         Mood and Affect: Mood normal.         Behavior: Behavior normal.         Assessment/Plan   Problem List Items Addressed This Visit       Asthma    Current Assessment & Plan     Continue Trelegy 200-62.5-25mg. Continue montelukast 10mg, ipratropium-albuterol  nebulizer, and albuterol inhaler. Referral to pulmonary medicine placed.          Relevant Medications    montelukast (Singulair) 10 mg tablet    albuterol 90 mcg/actuation inhaler    ipratropium-albuteroL (Duo-Neb) 0.5-2.5 mg/3 mL nebulizer solution    Hypertension    Current Assessment & Plan     Well-controlled.  Continue Amlodipine 5mg, metoprolol tartrate 50mg, and losartan 100mg. Follow strict DASH diet. Exercise for 30 mins daily         Relevant Medications    losartan (Cozaar) 100 mg tablet    amLODIPine (Norvasc) 5 mg tablet    metoprolol tartrate (Lopressor) 50 mg tablet    Medicare annual wellness visit, subsequent - Primary    Current Assessment & Plan     Discussed age-appropriate preventative health measures.          Other Visit Diagnoses       Encounter for screening for other disorder        Routine general medical examination at a health care facility                 Follow up in 6 months or sooner as needed

## 2023-10-13 PROBLEM — K57.92 ACUTE DIVERTICULITIS: Status: RESOLVED | Noted: 2023-08-28 | Resolved: 2023-10-13

## 2023-10-13 PROBLEM — Z00.00 MEDICARE ANNUAL WELLNESS VISIT, SUBSEQUENT: Status: ACTIVE | Noted: 2023-10-13

## 2023-10-13 PROBLEM — K92.2 LOWER GI BLEEDING: Status: RESOLVED | Noted: 2023-08-28 | Resolved: 2023-10-13

## 2023-10-13 PROBLEM — R05.9 COUGH: Status: RESOLVED | Noted: 2023-04-10 | Resolved: 2023-10-13

## 2023-10-13 PROBLEM — M25.562 CHRONIC PAIN OF LEFT KNEE: Status: RESOLVED | Noted: 2022-03-29 | Resolved: 2023-10-13

## 2023-10-13 PROBLEM — I10 ESSENTIAL HYPERTENSION: Chronic | Status: RESOLVED | Noted: 2020-03-05 | Resolved: 2023-10-13

## 2023-10-13 PROBLEM — Z86.16 HISTORY OF COVID-19: Status: RESOLVED | Noted: 2022-02-10 | Resolved: 2023-10-13

## 2023-10-13 PROBLEM — J45.40 MODERATE PERSISTENT ASTHMA WITHOUT COMPLICATION (HHS-HCC): Status: RESOLVED | Noted: 2022-02-10 | Resolved: 2023-10-13

## 2023-10-13 PROBLEM — R52 BODY ACHES: Status: RESOLVED | Noted: 2023-08-28 | Resolved: 2023-10-13

## 2023-10-13 PROBLEM — J45.20 MILD INTERMITTENT ASTHMA WITHOUT COMPLICATION (HHS-HCC): Chronic | Status: RESOLVED | Noted: 2020-03-05 | Resolved: 2023-10-13

## 2023-10-13 PROBLEM — M17.12 PRIMARY LOCALIZED OSTEOARTHROSIS OF LEFT LOWER LEG: Status: RESOLVED | Noted: 2023-04-10 | Resolved: 2023-10-13

## 2023-10-13 PROBLEM — H26.9 CATARACT: Status: RESOLVED | Noted: 2023-04-10 | Resolved: 2023-10-13

## 2023-10-13 PROBLEM — G89.29 CHRONIC PAIN OF LEFT KNEE: Status: RESOLVED | Noted: 2022-03-29 | Resolved: 2023-10-13

## 2023-10-13 RX ORDER — MONTELUKAST SODIUM 10 MG/1
10 TABLET ORAL NIGHTLY
Qty: 90 TABLET | Refills: 1 | Status: SHIPPED | OUTPATIENT
Start: 2023-10-13

## 2023-10-13 RX ORDER — METOPROLOL TARTRATE 50 MG/1
50 TABLET ORAL 2 TIMES DAILY
Qty: 180 TABLET | Refills: 1 | Status: SHIPPED | OUTPATIENT
Start: 2023-10-13

## 2023-10-13 RX ORDER — IPRATROPIUM BROMIDE AND ALBUTEROL SULFATE 2.5; .5 MG/3ML; MG/3ML
3 SOLUTION RESPIRATORY (INHALATION) 2 TIMES DAILY
Qty: 180 ML | Refills: 5 | Status: SHIPPED | OUTPATIENT
Start: 2023-10-13

## 2023-10-13 RX ORDER — LOSARTAN POTASSIUM 100 MG/1
100 TABLET ORAL DAILY
Qty: 90 TABLET | Refills: 1 | Status: SHIPPED | OUTPATIENT
Start: 2023-10-13

## 2023-10-13 RX ORDER — ALBUTEROL SULFATE 90 UG/1
2 AEROSOL, METERED RESPIRATORY (INHALATION)
Qty: 18 G | Refills: 3 | Status: SHIPPED | OUTPATIENT
Start: 2023-10-13

## 2023-10-13 RX ORDER — AMLODIPINE BESYLATE 5 MG/1
5 TABLET ORAL
Qty: 90 TABLET | Refills: 1 | Status: SHIPPED | OUTPATIENT
Start: 2023-10-13 | End: 2024-03-19 | Stop reason: SDUPTHER

## 2023-10-13 NOTE — ASSESSMENT & PLAN NOTE
Well-controlled.  Continue Amlodipine 5mg, metoprolol tartrate 50mg, and losartan 100mg. Follow strict DASH diet. Exercise for 30 mins daily

## 2023-10-13 NOTE — ASSESSMENT & PLAN NOTE
Continue Trelegy 200-62.5-25mg. Continue montelukast 10mg, ipratropium-albuterol nebulizer, and albuterol inhaler. Referral to pulmonary medicine placed.

## 2023-10-16 ENCOUNTER — ANCILLARY PROCEDURE (OUTPATIENT)
Dept: RADIOLOGY | Facility: CLINIC | Age: 68
End: 2023-10-16
Payer: MEDICARE

## 2023-10-16 DIAGNOSIS — R92.8 ABNORMAL MAMMOGRAM: ICD-10-CM

## 2023-10-16 PROCEDURE — 76642 ULTRASOUND BREAST LIMITED: CPT | Mod: BILATERAL PROCEDURE | Performed by: RADIOLOGY

## 2023-10-16 PROCEDURE — 76642 ULTRASOUND BREAST LIMITED: CPT | Mod: 50

## 2023-10-31 PROBLEM — N63.20 MASS OF LEFT BREAST: Status: ACTIVE | Noted: 2023-10-31

## 2023-10-31 NOTE — PROGRESS NOTES
Betty Mendieta female   1955 68 y.o.  [id number]      Chief Complaint  New patient, biopsy consultation.    History Of Present Illness  Betty Mendieta is a very pleasant 68 y.o. AA female seen in the breast center for biopsy consultation. She denies breast surgery. She has a history of a benign right breast core biopsy. She has no family history of breast cancer.     BREAST IMAGING: 10/16/2023 Bilateral ultrasound, indicates BI-RADS Category 4. Left breast 6:00, 5 cm from the nipple the correlate for the mammographic mass is identified. It is a irregular ill-defined hypoechoic mass with internal vascularity which demonstrates areas of increased stiffness on elastography. The mass measures 0.8 x 0.6 x 0.8 cm. The left axilla demonstrates only normal appearing lymph nodes. Ultrasound guided core biopsy recommended. Right breast in the  subareolar region to evaluate the nipple discharge. No suspicious sonographic abnormality is identified. There is a dilated duct which demonstrates no internal echoes or intraductal masses.    REPRODUCTIVE HISTORY: menarche age 14, , first birth age 28, did not breastfeed, OCP's x 5-10 years, natural menopause age 55, no HRT, scattered fibroglandular tissue                                  FAMILY CANCER HISTORY:   None     Review of Systems  Constitutional:  Negative for appetite change, fatigue, fever and unexpected weight change.   HENT:  Negative for ear pain, hearing loss, nosebleeds, sore throat and trouble swallowing.    Eyes:  Negative for discharge, itching and visual disturbance.   Breast: As stated in HPI.  Respiratory:  Negative for cough, chest tightness and shortness of breath.    Cardiovascular:  Negative for chest pain, palpitations and leg swelling.   Gastrointestinal:  Negative for abdominal pain, constipation, diarrhea and nausea.   Endocrine: Negative for cold intolerance and heat intolerance.   Genitourinary:  Negative for dysuria, frequency, hematuria, pelvic  pain and vaginal bleeding.   Musculoskeletal:  Negative for arthralgias, back pain, gait problem, joint swelling and myalgias.   Skin:  Negative for color change and rash.   Allergic/Immunologic: Negative for environmental allergies and food allergies.   Neurological:  Negative for dizziness, tremors, speech difficulty, weakness, numbness and headaches.   Hematological:  Does not bruise/bleed easily.   Psychiatric/Behavioral:  Negative for agitation, dysphoric mood and sleep disturbance. The patient is not nervous/anxious.       Past Medical History  She has a past medical history of Cramp and spasm (2017), Essential (primary) hypertension (10/26/2018), Injury of conjunctiva and corneal abrasion without foreign body, unspecified eye, initial encounter (2020), Ocular hypertension, unspecified eye (2018), Other chest pain (10/26/2018), Other conditions influencing health status (2013), Pain in right shoulder (2018), Pain in unspecified knee (2014), Personal history of diseases of the blood and blood-forming organs and certain disorders involving the immune mechanism (2017), Personal history of other (healed) physical injury and trauma (2020), Personal history of other diseases of the musculoskeletal system and connective tissue (2018), Personal history of other medical treatment (2017), Personal history of other specified conditions (2015), Personal history of urinary (tract) infections (10/29/2015), Personal history of urinary (tract) infections (2015), Preglaucoma, unspecified, unspecified eye, Presence of spectacles and contact lenses, Radiculopathy, lumbar region (2016), and Unilateral primary osteoarthritis, left knee (2021).    Surgical History  She has a past surgical history that includes  section, classic (2014); Tubal ligation (2014); and Breast biopsy (2014).    Family History  Cancer-related family  history is not on file.     Social History  She reports that she has never smoked. She has never used smokeless tobacco. She reports that she does not drink alcohol and does not use drugs.    Allergies  Budesonide-formoterol, Nickel, and Nut - unspecified    Medications  Current Outpatient Medications   Medication Instructions    acetaminophen (TYLENOL 8 HOUR) 1,300 mg, oral, 2 times daily PRN    albuterol 90 mcg/actuation inhaler 2 puffs, inhalation, 4 times daily RT    amLODIPine (NORVASC) 5 mg, oral, Every Day, For blood pressure    APPLE CIDER VINEGAR ORAL 1 tablet, oral, Daily    calcium carbonate-vitamin D3 (Calcium 600 with Vitamin D3) 600 mg-10 mcg (400 unit) chewable tablet 1 tablet, oral, Daily RT    fluticasone-umeclidin-vilanter (Trelegy Ellipta) 200-62.5-25 mcg blister with device 1 puff, inhalation, Daily before breakfast    ibuprofen 800 mg tablet 1 tablet, oral, 2 times daily PRN    ipratropium-albuteroL (Duo-Neb) 0.5-2.5 mg/3 mL nebulizer solution 3 mL, nebulization, 2 times daily    latanoprost (Xalatan) 0.005 % ophthalmic solution 1 drop, Both Eyes, Nightly    losartan (COZAAR) 100 mg, oral, Daily    magnesium oxide (Mag-Ox) 400 mg tablet oral    metoprolol tartrate (LOPRESSOR) 50 mg, oral, 2 times daily    montelukast (SINGULAIR) 10 mg, oral, Nightly    moxifloxacin (Vigamox) 0.5 % ophthalmic solution 1 drop, ophthalmic (eye)    multivitamin tablet 1 tablet, oral, Every Day       Last Recorded Vitals  There were no vitals taken for this visit.    Physical Exam  Patient is alert and oriented x3 and in a relaxed and appropriate mood. Her gait is steady and hand grasps are equal. Sclera is clear. The breasts are nearly symmetrical. The tissue is soft without palpable abnormalities, discrete nodules or masses. The skin and nipples appear normal. There is no cervical, supraclavicular or axillary lymphadenopathy. Heart rate and rhythm normal, S1 and S2 appreciated. The lungs are clear to auscultation  bilaterally. Abdomen is soft and non-tender.      Relevant Results and Imaging  Study Result    Narrative & Impression   Interpreted By:  Jay Linder,   STUDY:  BI US BREAST LIMITED BILATERAL;  10/16/2023 11:12 am      INDICATION:  Callback from screening. Nipple discharge.      COMPARISON:  Mammogram dated 09/26/2023      FINDINGS:  The patient was recalled for a mass in the left breast. Since her  screening mammogram, she has developed right nipple discharge.      Sonographic images were obtained of the right breast in the  subareolar region to evaluate the nipple discharge. No suspicious  sonographic abnormality is identified. There is a dilated duct which  demonstrates no internal echoes or intraductal masses. Color Doppler  imaging demonstrates normal vascularity. Elastography shows soft  tissue.      The left breast was evaluated. At the 6 o'clock position 5 cm from  the nipple the correlate for the mammographic mass is identified. It  is a irregular ill-defined hypoechoic mass with internal vascularity  which demonstrates areas of increased stiffness on elastography. The  mass measures 0.8 x 0.6 x 0.8 cm.      The left axilla demonstrates only normal appearing lymph nodes.      IMPRESSION:  Mass for which the patient was recalled demonstrates both suspicious  features mammographically and sonographically. An ultrasound-guided  biopsy is recommended.      No suspicious findings seen to explain the patient's nipple  discharge. Clinical follow-up is recommended.      The findings and recommendations were discussed with the patient the  time of interpretation. She will be scheduled for surgical  consultation and biopsy. A notification was sent to the ordering  provider.    BI-RADS Category:  4 Suspicious.  Recommendation:  Biopsy.  Recommended Date:  Immediate.  Laterality:  Left      Signed by: Jay Linder 10/16/2023 11:47 AM       Time was spent viewing digital images. I explained the results in depth, along  with suggested explanation for follow up recommendations based on the testing results. BI-RADS Category 4    Visit Diagnosis  1. Abnormal finding on breast imaging        2. Mass of left breast, unspecified quadrant          Assessment/Plan  Abnormal mammogram, left breast mass, no breast surgery, history right benign core biopsy, no family history of breast cancer, scattered fibroglandular tissue    Plan:  Left breast ultrasound guided core biopsy.    Patient Discussion/Summary  I recommend a left breast ultrasound guided core biopsy. A breast radiology physician will perform the procedure. Possible diagnoses include benign, atypia or cancer. Bruising and mild discomfort after the biopsy is normal and will improve. I typically have results in 3-5 business days. I will call you with the results, please have your phone handy to take my call. I will provide recommendations for future follow up based on your biopsy results.     IMPORTANT INFORMATION REGARDING YOUR RESULTS    If you receive medical information from My [facility] Personal Health Record (online chart) your results will be released into your chart. This means you may view or see results of your biopsy or procedure before I contact you directly. If this occurs, please call the office and we will discuss your results over the phone.    You can see your health information, review clinical summaries from office visits & test results online when you follow your health with MY  Chart, a personal health record. To sign up go to [email or web address]. If you need assistance with signing up or trouble getting into your account call [facility] Patient Line 24/7 at [phone].    My office phone number is [phone]  if you need to get in touch with me or have additional questions or concerns. Thank you for choosing [facility] and trusting me as your healthcare provider. I look forward to seeing you again at your next office visit. I am honored to be  a provider on your health care team and I remain dedicated to helping you achieve your health goals.       Terrie Gloria, APRN-CNP

## 2023-11-01 ENCOUNTER — ANCILLARY PROCEDURE (OUTPATIENT)
Dept: RADIOLOGY | Facility: CLINIC | Age: 68
End: 2023-11-01
Payer: MEDICARE

## 2023-11-01 ENCOUNTER — PROCEDURE VISIT (OUTPATIENT)
Dept: SURGICAL ONCOLOGY | Facility: CLINIC | Age: 68
End: 2023-11-01
Payer: MEDICARE

## 2023-11-01 DIAGNOSIS — R92.8 ABNORMAL FINDING ON BREAST IMAGING: Primary | ICD-10-CM

## 2023-11-01 DIAGNOSIS — R92.8 OTHER ABNORMAL AND INCONCLUSIVE FINDINGS ON DIAGNOSTIC IMAGING OF BREAST: ICD-10-CM

## 2023-11-01 DIAGNOSIS — N63.20 MASS OF LEFT BREAST, UNSPECIFIED QUADRANT: ICD-10-CM

## 2023-11-01 PROCEDURE — 77065 DX MAMMO INCL CAD UNI: CPT | Mod: RT

## 2023-11-01 PROCEDURE — 2720000007 HC OR 272 NO HCPCS

## 2023-11-01 PROCEDURE — 88342 IMHCHEM/IMCYTCHM 1ST ANTB: CPT | Performed by: STUDENT IN AN ORGANIZED HEALTH CARE EDUCATION/TRAINING PROGRAM

## 2023-11-01 PROCEDURE — 19083 BX BREAST 1ST LESION US IMAG: CPT | Mod: LEFT SIDE | Performed by: RADIOLOGY

## 2023-11-01 PROCEDURE — 99204 OFFICE O/P NEW MOD 45 MIN: CPT | Performed by: NURSE PRACTITIONER

## 2023-11-01 PROCEDURE — 88342 IMHCHEM/IMCYTCHM 1ST ANTB: CPT | Mod: TC,AHULAB | Performed by: NURSE PRACTITIONER

## 2023-11-01 PROCEDURE — 88342 IMHCHEM/IMCYTCHM 1ST ANTB: CPT | Mod: TC,SUR,AHULAB | Performed by: NURSE PRACTITIONER

## 2023-11-01 PROCEDURE — 88305 TISSUE EXAM BY PATHOLOGIST: CPT | Performed by: STUDENT IN AN ORGANIZED HEALTH CARE EDUCATION/TRAINING PROGRAM

## 2023-11-01 PROCEDURE — 2500000005 HC RX 250 GENERAL PHARMACY W/O HCPCS: Performed by: RADIOLOGY

## 2023-11-01 PROCEDURE — 19083 BX BREAST 1ST LESION US IMAG: CPT | Mod: TC,LT

## 2023-11-01 PROCEDURE — 96372 THER/PROPH/DIAG INJ SC/IM: CPT | Performed by: RADIOLOGY

## 2023-11-01 PROCEDURE — 77065 DX MAMMO INCL CAD UNI: CPT | Mod: LEFT SIDE | Performed by: RADIOLOGY

## 2023-11-01 PROCEDURE — 99214 OFFICE O/P EST MOD 30 MIN: CPT | Performed by: NURSE PRACTITIONER

## 2023-11-01 PROCEDURE — 88305 TISSUE EXAM BY PATHOLOGIST: CPT | Mod: TC,SUR,AHULAB | Performed by: NURSE PRACTITIONER

## 2023-11-01 RX ADMIN — Medication 10 ML: at 09:46

## 2023-11-01 ASSESSMENT — PAIN SCALES - GENERAL
PAINLEVEL_OUTOF10: 0 - NO PAIN

## 2023-11-01 NOTE — DISCHARGE INSTRUCTIONS
AFTER THE TEST  A steri-strip and bandage will be placed over the incision. You may shower after 24 hours. Remove bandage after 24 hours. Remove bandage after the shower. Leave the steri-strips in place to fall off on their own. If after 1 week the steri-strips are still on, you may remove them. Avoid swimming or soaking in tub for 3 days.     You may have mild discomfort at the test site. If needed, you may take Tylenol (Acetaminophen) for pain. Please avoid taking NSAIDs, Motrin, Advil, Aleve, or ibuprofen for 24 hours following the biopsy. After 24 hours you may resume NSAIDSs.     If you take aspirin, Plavix, Coumadin, Xarelto or Eliquis please tell us. If these medications were stopped by your provider, please ask them when to resume.     You may have some tenderness, bruising or slight bleeding at the site. Please apply ice packs to the site for 15 minutes on and 15 minutes off for a 2 hour minimum.     Most people can return to their usual routine after the procedure. Avoid Strenuous activity for 24 hours.     Sleep in a bra the night after your biopsy. Continue to do so for comfort.     Call your doctor if you have any of the following symptoms :  Fever  Increased pain  Increased bleeding  Redness  Increased swelling  Yellowish drainage  Your doctor will get the biopsy results within 5 - 7 days. Call your doctor with any questions.     Patient education brochure and pain/comfort measures have been reviewed.   Phone number provided to contact Breast Center if problems arise.

## 2023-11-13 ENCOUNTER — TELEPHONE (OUTPATIENT)
Dept: SURGERY | Facility: HOSPITAL | Age: 68
End: 2023-11-13
Payer: MEDICARE

## 2023-11-13 NOTE — TELEPHONE ENCOUNTER
"Informed breast biopsy shows breast cancer. She will meet with surgeon and plan for care    Resulted Orders   Surgical Pathology Exam   Result Value Ref Range    Case Report       Surgical Pathology                                Case: [id number]                                  Authorizing Provider:  EDNA Rincon    Collected:           11/01/2023 0948              Ordering Location:     [facility]       Received:            11/01/2023 [facility]                                     Center                                                                       Pathologist:           Josue Frias MD                                                           Specimen:    BREAST CORE BIOPSY LEFT, LEFT BREAST 6:00 5 CM FN                                          FINAL DIAGNOSIS       A.  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  E-cadherin shows membranous pattern of expression in invasive carcinoma.  In this limited sample, the invasive carcinoma measures up to approximately 7 mm in greatest dimension.    ER, TX and HER2 will be reported separately in an addendum.              By the signature on this report, the individual or group listed as making the Final Interpretation/Diagnosis certifies that they have reviewed this case.       Clinical History       ULTRASOUND GUIDED CORE BIOPSY OF LEFT BREAST 6:00 5 CM FN      Gross Description       Received in formalin, labeled with the patient´s name and hospital number and \" left breast 6:00 5 cm from nipple\", are multiple irregular/cylindrical segments of yellow-white fatty soft tissue aggregating to 3.0 x 0.7 x 0.5 cm.The specimen is submitted in toto in 2 cassettes.  [facility]    NOTE:    Ischemia time: Not provided.  This specimen was placed into formalin at: 0948 11/1/2023.      Disclaimer       One or more of the " reagents used to perform assays on this specimen MAY have contained components considered to be analyte specific reagents (ASR's).  ASR's have not been cleared or approved by the U.S. Food and Drug Administration.  These assays were developed and their performance characteristics determined by the Department of Pathology at [facility]. The FDA does not require this test to go through premarket FDA review. This test is used for clinical purposes. It should not be regarded as investigational or for research. This laboratory is certified under the Clinical Laboratory Improvement Amendments (CLIA) as qualified to perform high complexity clinical laboratory testing.  The assays were performed with appropriate positive and negative controls which stained appropriately.         9:37 AM

## 2023-11-14 LAB
LAB AP ASR DISCLAIMER: NORMAL
LABORATORY COMMENT REPORT: NORMAL
PATH REPORT.ADDENDUM SPEC: NORMAL
PATH REPORT.FINAL DX SPEC: NORMAL
PATH REPORT.GROSS SPEC: NORMAL
PATH REPORT.RELEVANT HX SPEC: NORMAL
PATH REPORT.TOTAL CANCER: NORMAL

## 2023-11-14 NOTE — PROGRESS NOTES
Chief Complaint  New left breast cancer      History of Present Illness    Referring Provider: DORA mathew  PCP A Head    67 yo AA female here with new left breast cancer   cT1 cN0 grade 1 IDC, ER/[place] > 95% Her2 neg  Here alone    History:  1) history of right breast excisional biopsy, benign  2) 2021. BL screening mammogram. Scattered FG tissue. Right breast negative. Left focal asymmetry BIRADS 0  3) 2022. Left MG/US. Left asymmetry persists.  Left US.  6:00N5, 5 x 3 x 5 mm mass BIRADS 3. Rec left MG/US 6 months  4) no FU until 2023. Right nipple dc  5) 2023. BL MG scattered FG tissue. Left breast mass with interval change and associated distortion.  BIRADS 0. Rec left US and right subareolar US  6) 10/16/2023. BL US.  Right subareolar US negative. Left 6:00N5, 0.8 x 0.6 x 0.8 cm irregular mass BIRADS 4. Left axillary US negative.   7) 2023. Left breast 6:00N5 biopsy, grade 1 IDC, ER/[place] > 95% Her2 neg Open coil hydromark.       She presents today for further management and surgical discussion.    Ob/gyn history:  Menarche 14  Menopause 55  , age of first delivery 28  5-10 yrs OCPs, no fertility treatments, no HRT    No family history of breast or ovarian cancer.     Review of systems  A comprehensive ROS was taken on the patient intake form and reviewed with the patient. This form is scanned into the electronic medical record.    Constitutional symptoms: Denies generalized fatigue. Denies weight change, fevers/chills, difficulty sleeping   Eyes: Denies double vision, glaucoma, cataracts.  Ear/nose/throat/mouth: Denies hearing changes, sore throat, sinus problems.  Cardiovascular: No chest pain. Denies irregular heartbeat. Denies ankle swelling.  Respiratory: No wheezing, cough, or shortness of breath.  Gastrointestinal: No abdominal pain, No nausea/vomiting. No indigestion/heartburn. No change in bowel habits. No constipation or diarrhea.   Genitourinary: No urinary incontinence. No urinary  frequency. No painful urination.  Musculoskeletal: No bone pain, no muscle pain, no joint pain.   Integumentary: No rash. No masses. No changes in moles. No easy bruising.  Neurological: No headaches. No tremors. No numbness/tingling.  Psychiatric: No anxiety. No depression.  Endocrine: No excessive thirst. Not too hot or too cold. Not tired or fatigued.   Hematological/lymphatic: No swollen glands or blood clotting problems. No bruising.     Physical exam  A chaperone was offered for all portions of the physical exam. The patient declined.     General appearance: appears stated age, alert and oriented x 3  Head: Normocephalic, atraumatic  Eyes: conjunctivae/corneas clear.  Ears: External ears are normal, hearing is grossly intact.  Lungs: normal breathing  Heart: regular   Abdomen: Soft, nontender, nondistended.  Neurologic: grossly normal  Lymph nodes: No cervical, supraclavicular or axillary lymphadenopathy bilaterally    Breast: A comprehensive breast exam was performed in the seated and supine positions. Breasts are symmetrical. Bilateral nipples are everted. There are no skin changes on arm maneuvers. Bra size: 42C   Right: no masses   Left: no masses. Biopsy site clean.    Results  Imaging  All breast imaging personally reviewed by me.  See HPI    Pathology  11/1/2023. Left breast 6:00N5 biopsy, grade 1 IDC, ER/[place] > 95% Her2 neg Open coil hydromark.     Impression:   1) 67 yo AA female here with new left breast cancer   cT1 cN0 grade 1 IDC, ER/ME > 95% Her2 neg  2) Co-morbidities include asthma. Non-smoker  3) No family history of breast or ovarian cancer      Plan:   I had a long discussion with Ms. Krueger today. We reviewed her imaging and work-up to date and the results.  She has a new  left breast cancer.  We reviewed the multidisciplinary treatment of breast cancer.     Her cancer can be effectively treated with lumpectomy.  We reviewed that lumpectomy with radiation treatment was equivalent to  mastectomy in terms of overall survival and distant recurrence, with more than 20 years of follow-up data.  We discussed that lumpectomy with radiation has a slightly higher local recurrence rate, at about 5% in 10 years, versus mastectomy at 1% in 10 years.  The risks of lumpectomy including bleeding, infection, and need for reexcision of margins (approximately 20%), was discussed.  She was told that lumpectomy is usually a day surgery, and the postoperative recovery was discussed. She was informed that decisions regarding lumpectomy versus mastectomy would not impact need for chemotherapy.  We reviewed the need for preoperative mag seed placement as this is a nonpalpable lesion.    We discussed the requirements of radiation treatment, and the short term risks (mild burn, tiredness, swelling or shrinking of breast) and the long term risks (skin changes, angiosarcoma).    She is clinically node negative.  We discussed management of the axilla.  The rationale behind sentinel node biopsy was explained, including the smaller risk of lymphedema.  Additional risks of surgery, including nerve damage, were discussed.     She is an excellent candidate for lumpectomy and this is her preference.  She will be scheduled for surgery.  She will need a preoperative mag seed placement.  She will follow-up about 1 week after surgery to review her pathology report.  She should call the office with any sooner concerns.

## 2023-11-17 ENCOUNTER — OFFICE VISIT (OUTPATIENT)
Dept: SURGICAL ONCOLOGY | Facility: HOSPITAL | Age: 68
End: 2023-11-17
Payer: MEDICARE

## 2023-11-17 VITALS
DIASTOLIC BLOOD PRESSURE: 84 MMHG | TEMPERATURE: 97 F | WEIGHT: 220 LBS | HEART RATE: 71 BPM | BODY MASS INDEX: 38.97 KG/M2 | SYSTOLIC BLOOD PRESSURE: 146 MMHG

## 2023-11-17 DIAGNOSIS — Z17.0 MALIGNANT NEOPLASM OF LOWER-OUTER QUADRANT OF LEFT BREAST OF FEMALE, ESTROGEN RECEPTOR POSITIVE (MULTI): Primary | ICD-10-CM

## 2023-11-17 DIAGNOSIS — C50.512 MALIGNANT NEOPLASM OF LOWER-OUTER QUADRANT OF LEFT BREAST OF FEMALE, ESTROGEN RECEPTOR POSITIVE (MULTI): Primary | ICD-10-CM

## 2023-11-17 PROCEDURE — 99205 OFFICE O/P NEW HI 60 MIN: CPT | Performed by: SURGERY

## 2023-11-17 PROCEDURE — 3077F SYST BP >= 140 MM HG: CPT | Performed by: SURGERY

## 2023-11-17 PROCEDURE — 1159F MED LIST DOCD IN RCRD: CPT | Performed by: SURGERY

## 2023-11-17 PROCEDURE — 1036F TOBACCO NON-USER: CPT | Performed by: SURGERY

## 2023-11-17 PROCEDURE — 1126F AMNT PAIN NOTED NONE PRSNT: CPT | Performed by: SURGERY

## 2023-11-17 PROCEDURE — 3079F DIAST BP 80-89 MM HG: CPT | Performed by: SURGERY

## 2023-11-17 PROCEDURE — 99215 OFFICE O/P EST HI 40 MIN: CPT | Performed by: SURGERY

## 2023-11-17 PROCEDURE — 1160F RVW MEDS BY RX/DR IN RCRD: CPT | Performed by: SURGERY

## 2023-11-17 RX ORDER — SODIUM CHLORIDE 9 MG/ML
100 INJECTION, SOLUTION INTRAVENOUS CONTINUOUS
Status: CANCELLED | OUTPATIENT
Start: 2023-11-17

## 2023-11-17 ASSESSMENT — PAIN SCALES - GENERAL: PAINLEVEL: 0-NO PAIN

## 2023-12-04 ENCOUNTER — TELEPHONE (OUTPATIENT)
Dept: PREADMISSION TESTING | Facility: HOSPITAL | Age: 68
End: 2023-12-04
Payer: MEDICARE

## 2023-12-11 ENCOUNTER — TELEMEDICINE CLINICAL SUPPORT (OUTPATIENT)
Dept: PREADMISSION TESTING | Facility: HOSPITAL | Age: 68
End: 2023-12-11
Payer: MEDICARE

## 2023-12-11 RX ORDER — TETRACYCLINE HCL 500 MG
1 CAPSULE ORAL DAILY
COMMUNITY

## 2023-12-12 ENCOUNTER — ANESTHESIA EVENT (OUTPATIENT)
Dept: OPERATING ROOM | Facility: HOSPITAL | Age: 68
End: 2023-12-12
Payer: MEDICARE

## 2023-12-12 ENCOUNTER — PRE-ADMISSION TESTING (OUTPATIENT)
Dept: PREADMISSION TESTING | Facility: HOSPITAL | Age: 68
End: 2023-12-12
Payer: MEDICARE

## 2023-12-12 ENCOUNTER — ANCILLARY PROCEDURE (OUTPATIENT)
Dept: RADIOLOGY | Facility: CLINIC | Age: 68
End: 2023-12-12
Payer: MEDICARE

## 2023-12-12 ENCOUNTER — LAB (OUTPATIENT)
Dept: LAB | Facility: LAB | Age: 68
End: 2023-12-12
Payer: MEDICARE

## 2023-12-12 VITALS
BODY MASS INDEX: 39.1 KG/M2 | DIASTOLIC BLOOD PRESSURE: 79 MMHG | OXYGEN SATURATION: 97 % | HEART RATE: 73 BPM | WEIGHT: 220.68 LBS | SYSTOLIC BLOOD PRESSURE: 128 MMHG | RESPIRATION RATE: 18 BRPM | HEIGHT: 63 IN | TEMPERATURE: 97.9 F

## 2023-12-12 DIAGNOSIS — Z01.818 PREOP TESTING: ICD-10-CM

## 2023-12-12 DIAGNOSIS — R92.8 OTHER ABNORMAL AND INCONCLUSIVE FINDINGS ON DIAGNOSTIC IMAGING OF BREAST: ICD-10-CM

## 2023-12-12 DIAGNOSIS — R92.8 OTHER ABNORMAL AND INCONCLUSIVE FINDINGS ON DIAGNOSTIC IMAGING OF BREAST: Primary | ICD-10-CM

## 2023-12-12 DIAGNOSIS — Z01.818 PREOP TESTING: Primary | ICD-10-CM

## 2023-12-12 LAB
ANION GAP SERPL CALC-SCNC: 12 MMOL/L (ref 10–20)
ATRIAL RATE: 70 BPM
BASOPHILS # BLD AUTO: 0.1 X10*3/UL (ref 0–0.1)
BASOPHILS NFR BLD AUTO: 1.7 %
BUN SERPL-MCNC: 10 MG/DL (ref 6–23)
CALCIUM SERPL-MCNC: 9.9 MG/DL (ref 8.6–10.3)
CHLORIDE SERPL-SCNC: 104 MMOL/L (ref 98–107)
CO2 SERPL-SCNC: 29 MMOL/L (ref 21–32)
CREAT SERPL-MCNC: 0.76 MG/DL (ref 0.5–1.05)
EOSINOPHIL # BLD AUTO: 0.22 X10*3/UL (ref 0–0.7)
EOSINOPHIL NFR BLD AUTO: 3.7 %
ERYTHROCYTE [DISTWIDTH] IN BLOOD BY AUTOMATED COUNT: 12.9 % (ref 11.5–14.5)
GFR SERPL CREATININE-BSD FRML MDRD: 87 ML/MIN/1.73M*2
GLUCOSE SERPL-MCNC: 79 MG/DL (ref 74–99)
HCT VFR BLD AUTO: 39.6 % (ref 36–46)
HGB BLD-MCNC: 12.3 G/DL (ref 12–16)
IMM GRANULOCYTES # BLD AUTO: 0.02 X10*3/UL (ref 0–0.7)
IMM GRANULOCYTES NFR BLD AUTO: 0.3 % (ref 0–0.9)
LYMPHOCYTES # BLD AUTO: 1.01 X10*3/UL (ref 1.2–4.8)
LYMPHOCYTES NFR BLD AUTO: 17.1 %
MCH RBC QN AUTO: 28.4 PG (ref 26–34)
MCHC RBC AUTO-ENTMCNC: 31.1 G/DL (ref 32–36)
MCV RBC AUTO: 92 FL (ref 80–100)
MONOCYTES # BLD AUTO: 0.52 X10*3/UL (ref 0.1–1)
MONOCYTES NFR BLD AUTO: 8.8 %
NEUTROPHILS # BLD AUTO: 4.02 X10*3/UL (ref 1.2–7.7)
NEUTROPHILS NFR BLD AUTO: 68.4 %
NRBC BLD-RTO: 0 /100 WBCS (ref 0–0)
P AXIS: 55 DEGREES
P OFFSET: 206 MS
P ONSET: 141 MS
PLATELET # BLD AUTO: 487 X10*3/UL (ref 150–450)
POTASSIUM SERPL-SCNC: 4.2 MMOL/L (ref 3.5–5.3)
PR INTERVAL: 164 MS
Q ONSET: 223 MS
QRS COUNT: 12 BEATS
QRS DURATION: 86 MS
QT INTERVAL: 388 MS
QTC CALCULATION(BAZETT): 419 MS
QTC FREDERICIA: 408 MS
R AXIS: 9 DEGREES
RBC # BLD AUTO: 4.33 X10*6/UL (ref 4–5.2)
SODIUM SERPL-SCNC: 141 MMOL/L (ref 136–145)
T AXIS: 8 DEGREES
T OFFSET: 417 MS
VENTRICULAR RATE: 70 BPM
WBC # BLD AUTO: 5.9 X10*3/UL (ref 4.4–11.3)

## 2023-12-12 PROCEDURE — 2500000005 HC RX 250 GENERAL PHARMACY W/O HCPCS: Performed by: RADIOLOGY

## 2023-12-12 PROCEDURE — 96372 THER/PROPH/DIAG INJ SC/IM: CPT | Performed by: RADIOLOGY

## 2023-12-12 PROCEDURE — 80048 BASIC METABOLIC PNL TOTAL CA: CPT

## 2023-12-12 PROCEDURE — 85025 COMPLETE CBC W/AUTO DIFF WBC: CPT

## 2023-12-12 PROCEDURE — A4648 IMPLANTABLE TISSUE MARKER: HCPCS

## 2023-12-12 PROCEDURE — 99204 OFFICE O/P NEW MOD 45 MIN: CPT | Performed by: PHYSICIAN ASSISTANT

## 2023-12-12 PROCEDURE — 77065 DX MAMMO INCL CAD UNI: CPT | Mod: LEFT SIDE | Performed by: RADIOLOGY

## 2023-12-12 PROCEDURE — 36415 COLL VENOUS BLD VENIPUNCTURE: CPT

## 2023-12-12 PROCEDURE — 77065 DX MAMMO INCL CAD UNI: CPT | Mod: LT

## 2023-12-12 PROCEDURE — 19285 PERQ DEV BREAST 1ST US IMAG: CPT | Mod: LT

## 2023-12-12 PROCEDURE — 2780000003 HC OR 278 NO HCPCS

## 2023-12-12 PROCEDURE — 93005 ELECTROCARDIOGRAM TRACING: CPT | Performed by: PHYSICIAN ASSISTANT

## 2023-12-12 PROCEDURE — 19285 PERQ DEV BREAST 1ST US IMAG: CPT | Mod: LEFT SIDE | Performed by: RADIOLOGY

## 2023-12-12 RX ADMIN — Medication 5 ML: at 08:23

## 2023-12-12 ASSESSMENT — ENCOUNTER SYMPTOMS
ABDOMINAL DISTENTION: 0
DIARRHEA: 0
WHEEZING: 0
SHORTNESS OF BREATH: 0
PALPITATIONS: 0
DIFFICULTY URINATING: 0
DYSPNEA AT REST: 0
CHILLS: 0
VOMITING: 0
FEVER: 0
CONFUSION: 0
RHINORRHEA: 0
NUMBNESS: 0
NECK STIFFNESS: 0
HEMOPTYSIS: 0
EYE PAIN: 0
UNEXPECTED WEIGHT CHANGE: 0
BLOOD IN STOOL: 0
DYSPNEA WITH EXERTION: 0
NAUSEA: 0
ARTHRALGIAS: 1
EYE DISCHARGE: 0
DYSURIA: 0
CONSTIPATION: 0
SINUS CONGESTION: 0
EXCESSIVE BLEEDING: 0
BRUISES/BLEEDS EASILY: 0
DOUBLE VISION: 0
NECK PAIN: 0
ABDOMINAL PAIN: 0
COUGH: 0
WEAKNESS: 0
TROUBLE SWALLOWING: 0
VISUAL CHANGE: 0
MYALGIAS: 0
SKIN CHANGES: 0
WOUND: 0
LIMITED RANGE OF MOTION: 0
LIGHT-HEADEDNESS: 0

## 2023-12-12 ASSESSMENT — PAIN SCALES - GENERAL
PAINLEVEL_OUTOF10: 0 - NO PAIN
PAINLEVEL_OUTOF10: 1

## 2023-12-12 NOTE — PREPROCEDURE INSTRUCTIONS
Medication List            Accurate as of December 12, 2023  9:32 AM. Always use your most recent med list.                albuterol 90 mcg/actuation inhaler  Inhale 2 puffs 4 times a day.  Notes to patient: Ok to use morning of surgery if needed     amLODIPine 5 mg tablet  Commonly known as: Norvasc  Take 1 tablet (5 mg) by mouth once every day. For blood pressure  Medication Adjustments for Surgery: Take morning of surgery with sip of water, no other fluids     apple cider vinegar 500 mg tablet  Medication Adjustments for Surgery: Stop 7 days before surgery     Calcium 600 with Vitamin D3 600 mg-10 mcg (400 unit) chewable tablet  Generic drug: calcium carbonate-vitamin D3  Medication Adjustments for Surgery: Continue until night before surgery     ibuprofen 800 mg tablet  Medication Adjustments for Surgery: Stop 7 days before surgery     ipratropium-albuteroL 0.5-2.5 mg/3 mL nebulizer solution  Commonly known as: Duo-Neb  Take 3 mL by nebulization 2 times a day.  Notes to patient: Ok to use morning of surgery if needed     latanoprost 0.005 % ophthalmic solution  Commonly known as: Xalatan  Administer 1 drop into both eyes once daily at bedtime.  Medication Adjustments for Surgery: Take morning of surgery with sip of water, no other fluids     losartan 100 mg tablet  Commonly known as: Cozaar  Take 1 tablet (100 mg) by mouth once daily.  Medication Adjustments for Surgery: Stop 1 day before surgery     MEMORY COMPLEX ORAL  Medication Adjustments for Surgery: Stop 7 days before surgery     metoprolol tartrate 50 mg tablet  Commonly known as: Lopressor  Take 1 tablet by mouth 2 times a day.  Medication Adjustments for Surgery: Take morning of surgery with sip of water, no other fluids     montelukast 10 mg tablet  Commonly known as: Singulair  Take 1 tablet (10 mg) by mouth once daily at bedtime.  Medication Adjustments for Surgery: Take morning of surgery with sip of water, no other fluids     multivitamin  tablet  Medication Adjustments for Surgery: Stop 7 days before surgery     Tano Armstrongta 200-62.5-25 mcg blister with device  Generic drug: fluticasone-umeclidin-vilanter  Inhale 1 puff once daily in the morning. Take before meals.  Notes to patient: Ok to use morning of surgery if needed                          CONTACT SURGEON'S OFFICE IF YOU DEVELOP:  * Fever = 100.4 F   * New respiratory symptoms (e.g. cough, shortness of breath, respiratory distress, sore throat)  * Recent loss of taste or smell  *Flu like symptoms such as headache, fatigue or gastrointestinal symptoms  * You develop any open sores, shingles, burning or painful urination   AND/OR:  * You no longer wish to have the surgery.  * Any other personal circumstances change that may lead to the need to cancel or defer this surgery.  *You were admitted to any hospital within one week of your planned procedure.    SMOKING:  *Quitting smoking can make a huge difference to your health and recovery from surgery.    *If you need help with quitting, call 7-261-QUIT-NOW.    THE DAY BEFORE SURGERY:  *Do not eat any food after midnight the night before surgery.   *You are permitted to drink clear liquids (i.e. water, black coffee, tea, clear broth, apple juice) up to 2 hours before your surgery.    DIABETICS:  If diabetic, nothing by mouth (no solids or liquids) for 8 hours prior to surgery.   Please check fasting blood sugar upon waking up.  If fasting sugar is <80 mg/dl, please drink 100ml/3oz of apple juice no later than 2 hours prior to surgery.      SURGICAL TIME  *You will be contacted between 2 p.m. and 6 p.m. the business day before your surgery with your arrival time.  *If you haven't received a call by 6pm, call [phone].  *Scheduled surgery times may change and you will be notified if this occurs-check your personal voicemail for any updates.    ON THE MORNING OF SURGERY:  *Wear comfortable, loose fitting clothing.   *Do not use moisturizers,  creams, lotions or perfume.  *All jewelry and valuables should be left at home.  *Prosthetic devices such as contact lenses, hearing aids, dentures, eyelash extensions, hairpins and body piercing must be removed before surgery.    BRING WITH YOU:  *Photo ID and insurance card  *Current list of medicines and allergies  *Pacemaker/Defibrillator/Heart stent cards  *CPAP machine and mask  *Slings/splints/crutches  *Copy of your complete Advanced Directive/DHPOA-if applicable  *Neurostimulator implant remote    PARKING AND ARRIVAL:  *Check in at the Main Entrance desk and let them know you are here for surgery.  *You will be directed to the 2nd floor surgical waiting area.    AFTER OUTPATIENT SURGERY:  *A responsible adult MUST accompany you at the time of discharge and stay with you for 24 hours after your surgery.  *You may NOT drive yourself home after surgery.  *You may use a taxi or ride sharing service ([employer], Uber) to return home ONLY if you are accompanied by a friend or family member.  *Instructions for resuming your medications will be provided by your surgeon.

## 2023-12-12 NOTE — DISCHARGE INSTRUCTIONS
0818 Procedural steps explained and patient given opportunity to verbalize concerns and seek clarification.  Post procedure self-care and potential for bruising , hematoma, and pain reviewed.  Patient verbalizes understanding.   0839 Post procedure care reviewed with patient, ok to resume normal activity with no restrictions. Patient verbalized understanding and will follow up surgery as planned.

## 2023-12-12 NOTE — CPM/PAT H&P
[facility]/PAT Evaluation       Name: Betty Mendieta (Betty Mendieta)  /Age: 1955/68 y.o.         Date of Consult: 23    Referring Provider: Dr. Ruby Luis    Surgery, Date, and Length: Left Breast Magseed Partial Mastectomy; [place] Lymph Node Biopsy - Left , 23, 120MIN    Betty Mendieta is a 68 year-old female who presents to the [facility] for perioperative risk assessment prior to surgery.    Patient presents with a primary diagnosis of left breast cancer. 2023 BL MG showed scattered FG tissue. Left breast mass with interval change and associated distortion.  BIRADS 0. Rec left US and right subareolar US    10/16/2023. BL US.  Right subareolar US negative. Left 6:00N5, 0.8 x 0.6 x 0.8 cm irregular mass BIRADS 4. Left axillary US negative.     2023. Left breast 6:00N5 biopsy, grade 1 IDC, ER/NH > 95% Her2 neg Open coil hydromark.     No family history of breast cancer.  No left breast pain or discharge from nipple.        This note was created in part upon personal review of patient's medical records.      Patient is scheduled to have Left Breast Magseed Partial Mastectomy; [place] Lymph Node Biopsy - Left       Pt denies any past history of anesthetic complications such as PONV, awareness, prolonged sedation, dental damage, aspiration, cardiac arrest, difficult intubation, difficult I.V. access or unexpected hospital admissions.  NO malignant hyperthermia and or pseudocholinesterase deficiency.  +history of blood transfusions at time of diverticulitis flare up in 2019    The patient is not a Gnosticism and will accept blood and blood products if medically indicated.   Type and screen NOT sent.     Past Medical History:   Diagnosis Date    Asthma     Breast cancer, left breast (CMS/HCC)     Cataract     Diverticulosis     Essential (primary) hypertension 10/26/2018    Benign essential hypertension    GERD (gastroesophageal reflux disease)     Ocular hypertension, unspecified eye  2018    Ocular hypertension, unspecified laterality    Preglaucoma, unspecified, unspecified eye     Glaucoma suspect    Radiculopathy, lumbar region 2016    Lumbar radiculopathy    Unilateral primary osteoarthritis, left knee 2021    Primary localized osteoarthrosis of left lower leg    Vitamin D deficiency        Past Surgical History:   Procedure Laterality Date    BI US GUIDED BREAST LOCALIZATION LEFT Left 2023    BI [facility] GUIDED BRE[facility] LOC[facility]    BREAST BIOPSY Right     benign    BREAST BIOPSY Left 2023     SECTION, CLASSIC      x 2    TOTAL KNEE ARTHROPLASTY Left     TOTAL KNEE ARTHROPLASTY Right     TUBAL LIGATION      Tubal Ligation       Patient  has no history on file for sexual activity.    Family History   Problem Relation Name Age of Onset    Hypertension Mother      Asthma Father      Hypertension Father      Glaucoma Sister      Hypertension Sister      Stroke Brother       Social History     Socioeconomic History    Marital status: Legally      Spouse name: Not on file    Number of children: Not on file    Years of education: Not on file    Highest education level: Not on file   Occupational History    Not on file   Tobacco Use    Smoking status: Never    Smokeless tobacco: Never   Substance and Sexual Activity    Alcohol use: Never    Drug use: Never    Sexual activity: Not on file   Other Topics Concern    Not on file   Social History Narrative    Not on file     Social Determinants of Health     Financial Resource Strain: Not on file   Food Insecurity: Not on file   Transportation Needs: Not on file   Physical Activity: Not on file   Stress: Not on file   Social Connections: Not on file   Intimate Partner Violence: Not on file   Housing Stability: Not on file        Allergies   Allergen Reactions    Budesonide-Formoterol Shortness of breath, Other and Wheezing     Other reaction: asthma    Nickel Itching      Rash discoloration with cheap jewelry    Nut - Unspecified Swelling     BRAZIL NUTS ONLY       Prior to Admission medications    Medication Sig Start Date End Date Taking? Authorizing Provider   albuterol 90 mcg/actuation inhaler Inhale 2 puffs 4 times a day.  Patient taking differently: Inhale 2 puffs every 4 hours if needed. 10/13/23  Yes Dariana Laird PA-C   amLODIPine (Norvasc) 5 mg tablet Take 1 tablet (5 mg) by mouth once every day. For blood pressure 10/13/23  Yes Dariana Laird PA-C   apple cider vinegar 500 mg tablet Take 1 tablet by mouth once daily.   Yes Historical Provider, MD   calcium carbonate-vitamin D3 (Calcium 600 with Vitamin D3) 600 mg-10 mcg (400 unit) chewable tablet Chew 1 tablet once daily. 2/28/22  Yes Historical Provider, MD   fluticasone-umeclidin-vilanter (Trelegy Ellipta) 200-62.5-25 mcg blister with device Inhale 1 puff once daily in the morning. Take before meals. 8/28/23  Yes Dariana Laird PA-C   ibuprofen 800 mg tablet Take 1 tablet (800 mg) by mouth 2 times a day as needed. 9/20/22  Yes Historical Provider, MD   ipratropium-albuteroL (Duo-Neb) 0.5-2.5 mg/3 mL nebulizer solution Take 3 mL by nebulization 2 times a day. 10/13/23  Yes Daraina Laird PA-C   latanoprost (Xalatan) 0.005 % ophthalmic solution Administer 1 drop into both eyes once daily at bedtime. 10/10/23  Yes Roscoe Jorge, SAMPSON   losartan (Cozaar) 100 mg tablet Take 1 tablet (100 mg) by mouth once daily. 10/13/23  Yes Dariana Laird PA-C   metoprolol tartrate (Lopressor) 50 mg tablet Take 1 tablet by mouth 2 times a day. 10/13/23  Yes Dariana Laird PA-C   montelukast (Singulair) 10 mg tablet Take 1 tablet (10 mg) by mouth once daily at bedtime. 10/13/23  Yes Dariana Laird PA-C   multivitamin tablet Take 1 tablet by mouth once every day.   Yes Historical Provider, MD   vit C/vit E ac/selenium/ginkgo (MEMORY COMPLEX ORAL) Take by mouth.   Yes Historical Provider, MD BOWENS ROS:   Constitutional:    no fever    no chills   no unexpected weight change  Neuro/Psych:    no numbness   no weakness   no light-headedness   no confusion  Eyes:    no discharge   no pain   no vision loss   no diplopia   no visual disturbance  Ears:    no ear pain   no hearing loss   no tinnitus  Nose:    no nasal discharge   no sinus congestion   no epistaxis  Mouth:    no dental issues   no mouth pain   no oral bleeding   no mouth lesions  Throat:    no throat pain   no dysphagia  Neck:    no neck pain   no neck stiffness  Cardio:    Functional 4 Mets. 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[employer]   no chest pain   no palpitations   no peripheral edema   no dyspnea   no GRAY  Respiratory:    no cough   no wheezing   no hemoptysis   no shortness of breath  Endocrine:    no cold intolerance   no heat intolerance  GI:    no abdominal distention   no abdominal pain   no constipation   no diarrhea   no nausea   no vomiting   no blood in stool  :    no difficulty urinating   no dysuria   no oliguria  Musculoskeletal:    arthralgias (b/l hips)   no myalgias   no decreased ROM  Hematologic:    does not bruise/bleed easily   no excessive bleeding   no history of blood transfusion   no blood clots  Skin:   no skin changes   no sores/wound   no rash      Physical Exam  Constitutional:       General: She is not in acute distress.     Appearance: Normal appearance. She is not ill-appearing, toxic-appearing or diaphoretic.   HENT:      Head: Normocephalic and atraumatic.      Nose: Nose normal. No congestion or rhinorrhea.      Mouth/Throat:      Mouth: Mucous membranes are moist.      Pharynx: No posterior oropharyngeal erythema.   Eyes:      Extraocular Movements: Extraocular movements intact.      Conjunctiva/sclera: Conjunctivae normal.   Cardiovascular:      Rate and Rhythm: Normal rate and regular rhythm.      Pulses: Normal pulses.      Heart sounds: Normal heart sounds. No murmur  "heard.     No friction rub. No gallop.   Pulmonary:      Effort: Pulmonary effort is normal. No respiratory distress.      Breath sounds: Normal breath sounds. No stridor. No wheezing or rhonchi.   Abdominal:      General: Bowel sounds are normal. There is no distension.      Palpations: Abdomen is soft. There is no mass.      Tenderness: There is no abdominal tenderness. There is no guarding or rebound.      Hernia: No hernia is present.   Musculoskeletal:         General: No swelling, tenderness, deformity or signs of injury. Normal range of motion.      Cervical back: Normal range of motion and neck supple. No rigidity or tenderness.   Skin:     General: Skin is warm and dry.      Coloration: Skin is not jaundiced or pale.      Findings: No bruising, erythema, lesion or rash.   Neurological:      General: No focal deficit present.      Mental Status: She is alert and oriented to person, place, and time.      Cranial Nerves: No cranial nerve deficit.      Sensory: No sensory deficit.      Motor: No weakness.      Coordination: Coordination normal.   Psychiatric:         Mood and Affect: Mood normal.         Behavior: Behavior normal.          PAT AIRWAY:   Airway:     Mallampati::  II    Neck ROM::  Full   Multiple missing teeth on top and bottom      Visit Vitals  /79   Pulse 73   Temp 36.6 °C (97.9 °F)   Resp 18   Ht 1.6 m (5' 3\")   Wt 100 kg (220 lb 10.9 oz)   SpO2 97%   BMI 39.09 kg/m²   OB Status Postmenopausal   Smoking Status Never   BSA 2.11 m²      DASI Risk Score    No data to display       Caprini DVT Assessment    No data to display       Modified Frailty Index    No data to display       CHADS2 Stroke Risk  Current as of 20 minutes ago        N/A 3 - 100%: High Risk   2 - 3%: Medium Risk   0 - 2%: Low Risk     Last Change: N/A          This score determines the patient's risk of having a stroke if the patient has atrial fibrillation.        This score is not applicable to this patient. " Components are not calculated.          Revised Cardiac Risk Index    No data to display       Apfel Simplified Score    No data to display       Risk Analysis Index Results This Encounter    No data found in the last 1 encounters.       LABS:  Lab Results   Component Value Date    WBC 5.9 12/12/2023    HGB 12.3 12/12/2023    HCT 39.6 12/12/2023    MCV 92 12/12/2023     (H) 12/12/2023      Lab Results   Component Value Date    GLUCOSE 79 12/12/2023    CALCIUM 9.9 12/12/2023     12/12/2023    K 4.2 12/12/2023    CO2 29 12/12/2023     12/12/2023    BUN 10 12/12/2023    CREATININE 0.76 12/12/2023        EKG 12/12/23  NSR  Minimal voltage criteria for LVH, may be normal variant  Borderline EKG  Vent rate = 70 bpm      Assessment and Plan:       Patient is a 68-year-old female scheduled for a Left Breast Magseed Partial Mastectomy; [place] Lymph Node Biopsy - Left  with Dr. Luis on 12/13/23.    Patient has no active cardiac symptoms.   Patient denies any chest pain, tightness, heaviness, pressure, radiating pain, palpitations, irregular heartbeats, lightheadedness, cough, congestion, shortness of breath, GRAY, PND, near syncope, weight loss or gain.     RCRI  0  , 3.9 % Risk of MACE    Cardiac:  HTN  Encouraged lifestyle modifications, low-sodium diet, and increase activity as tolerated.  Monitor BP and follow up with managing physician for readings sustaining >140/90.     Pulmonary:  Asthma - cont inhalers as prescribed including on dos     Hematology:  Thrombocytosis  9/20/22   12/12/23 PLT = 487    Patient instructed to ambulate as soon as possible postoperatively to decrease thromboembolic risk.   Initiate mechanical DVT prophylaxis as soon as possible and initiate chemical prophylaxis when deemed safe from a bleeding standpoint post surgery.     LABS: CBC, BMP and EKG ordered. Lab results reviewed and show thrombocytosis which is stable.     STOP BANG: >50, obese , HTN =3    Caprini:  7    Risk assessment complete.  Patient is scheduled for a intermediate surgical risk procedure.        Preoperative medication instructions were provided and reviewed with the patient.  Any additional testing or evaluation was explained to the patient.  Nothing by mouth instructions were discussed and patient's questions were answered prior to conclusion to this encounter.  Patient verbalized understanding of preoperative instructions given in preadmission testing; discharge instructions available in EMR.    This note was dictated by a speech recognition.  Minor errors may have been detected in a speech recognition.

## 2023-12-12 NOTE — H&P (VIEW-ONLY)
[facility]/PAT Evaluation       Name: Betty Mendieta (Betty Mendieta)  /Age: 1955/68 y.o.         Date of Consult: 23    Referring Provider: Dr. Ruyb Luis    Surgery, Date, and Length: Left Breast Magseed Partial Mastectomy; [place] Lymph Node Biopsy - Left , 23, 120MIN    Betty Mendieta is a 68 year-old female who presents to the [facility] for perioperative risk assessment prior to surgery.    Patient presents with a primary diagnosis of left breast cancer. 2023 BL MG showed scattered FG tissue. Left breast mass with interval change and associated distortion.  BIRADS 0. Rec left US and right subareolar US    10/16/2023. BL US.  Right subareolar US negative. Left 6:00N5, 0.8 x 0.6 x 0.8 cm irregular mass BIRADS 4. Left axillary US negative.     2023. Left breast 6:00N5 biopsy, grade 1 IDC, ER/MA > 95% Her2 neg Open coil hydromark.     No family history of breast cancer.  No left breast pain or discharge from nipple.        This note was created in part upon personal review of patient's medical records.      Patient is scheduled to have Left Breast Magseed Partial Mastectomy; [place] Lymph Node Biopsy - Left       Pt denies any past history of anesthetic complications such as PONV, awareness, prolonged sedation, dental damage, aspiration, cardiac arrest, difficult intubation, difficult I.V. access or unexpected hospital admissions.  NO malignant hyperthermia and or pseudocholinesterase deficiency.  +history of blood transfusions at time of diverticulitis flare up in 2019    The patient is not a Holiness and will accept blood and blood products if medically indicated.   Type and screen NOT sent.     Past Medical History:   Diagnosis Date    Asthma     Breast cancer, left breast (CMS/HCC)     Cataract     Diverticulosis     Essential (primary) hypertension 10/26/2018    Benign essential hypertension    GERD (gastroesophageal reflux disease)     Ocular hypertension, unspecified eye  2018    Ocular hypertension, unspecified laterality    Preglaucoma, unspecified, unspecified eye     Glaucoma suspect    Radiculopathy, lumbar region 2016    Lumbar radiculopathy    Unilateral primary osteoarthritis, left knee 2021    Primary localized osteoarthrosis of left lower leg    Vitamin D deficiency        Past Surgical History:   Procedure Laterality Date    BI US GUIDED BREAST LOCALIZATION LEFT Left 2023    BI [facility] GUIDED BREAST LOCAL[facility]ATION [facility]    BREAST BIOPSY Right     benign    BREAST BIOPSY Left 2023     SECTION, CLASSIC      x 2    TOTAL KNEE ARTHROPLASTY Left     TOTAL KNEE ARTHROPLASTY Right     TUBAL LIGATION      Tubal Ligation       Patient  has no history on file for sexual activity.    Family History   Problem Relation Name Age of Onset    Hypertension Mother      Asthma Father      Hypertension Father      Glaucoma Sister      Hypertension Sister      Stroke Brother       Social History     Socioeconomic History    Marital status: Legally      Spouse name: Not on file    Number of children: Not on file    Years of education: Not on file    Highest education level: Not on file   Occupational History    Not on file   Tobacco Use    Smoking status: Never    Smokeless tobacco: Never   Substance and Sexual Activity    Alcohol use: Never    Drug use: Never    Sexual activity: Not on file   Other Topics Concern    Not on file   Social History Narrative    Not on file     Social Determinants of Health     Financial Resource Strain: Not on file   Food Insecurity: Not on file   Transportation Needs: Not on file   Physical Activity: Not on file   Stress: Not on file   Social Connections: Not on file   Intimate Partner Violence: Not on file   Housing Stability: Not on file        Allergies   Allergen Reactions    Budesonide-Formoterol Shortness of breath, Other and Wheezing     Other reaction: asthma    Nickel Itching      Rash discoloration with cheap jewelry    Nut - Unspecified Swelling     BRAZIL NUTS ONLY       Prior to Admission medications    Medication Sig Start Date End Date Taking? Authorizing Provider   albuterol 90 mcg/actuation inhaler Inhale 2 puffs 4 times a day.  Patient taking differently: Inhale 2 puffs every 4 hours if needed. 10/13/23  Yes Dariana Laird PA-C   amLODIPine (Norvasc) 5 mg tablet Take 1 tablet (5 mg) by mouth once every day. For blood pressure 10/13/23  Yes Dariana Laird PA-C   apple cider vinegar 500 mg tablet Take 1 tablet by mouth once daily.   Yes Historical Provider, MD   calcium carbonate-vitamin D3 (Calcium 600 with Vitamin D3) 600 mg-10 mcg (400 unit) chewable tablet Chew 1 tablet once daily. 2/28/22  Yes Historical Provider, MD   fluticasone-umeclidin-vilanter (Trelegy Ellipta) 200-62.5-25 mcg blister with device Inhale 1 puff once daily in the morning. Take before meals. 8/28/23  Yes Dariana Laird PA-C   ibuprofen 800 mg tablet Take 1 tablet (800 mg) by mouth 2 times a day as needed. 9/20/22  Yes Historical Provider, MD   ipratropium-albuteroL (Duo-Neb) 0.5-2.5 mg/3 mL nebulizer solution Take 3 mL by nebulization 2 times a day. 10/13/23  Yes Dariana Laird PA-C   latanoprost (Xalatan) 0.005 % ophthalmic solution Administer 1 drop into both eyes once daily at bedtime. 10/10/23  Yes Roscoe Jorge, SAMPSON   losartan (Cozaar) 100 mg tablet Take 1 tablet (100 mg) by mouth once daily. 10/13/23  Yes Dariana Laird PA-C   metoprolol tartrate (Lopressor) 50 mg tablet Take 1 tablet by mouth 2 times a day. 10/13/23  Yes Dariana Laird PA-C   montelukast (Singulair) 10 mg tablet Take 1 tablet (10 mg) by mouth once daily at bedtime. 10/13/23  Yes Dariana Laird PA-C   multivitamin tablet Take 1 tablet by mouth once every day.   Yes Historical Provider, MD   vit C/vit E ac/selenium/ginkgo (MEMORY COMPLEX ORAL) Take by mouth.   Yes Historical Provider, MD BOWENS ROS:   Constitutional:    no fever    no chills   no unexpected weight change  Neuro/Psych:    no numbness   no weakness   no light-headedness   no confusion  Eyes:    no discharge   no pain   no vision loss   no diplopia   no visual disturbance  Ears:    no ear pain   no hearing loss   no tinnitus  Nose:    no nasal discharge   no sinus congestion   no epistaxis  Mouth:    no dental issues   no mouth pain   no oral bleeding   no mouth lesions  Throat:    no throat pain   no dysphagia  Neck:    no neck pain   no neck stiffness  Cardio:    Functional 4 Mets. Patient denies SOB walking up 2 flights of stairs   M,W,F walking track at [facility] [employer]; director of food pantry at [employer]   no chest pain   no palpitations   no peripheral edema   no dyspnea   no GRAY  Respiratory:    no cough   no wheezing   no hemoptysis   no shortness of breath  Endocrine:    no cold intolerance   no heat intolerance  GI:    no abdominal distention   no abdominal pain   no constipation   no diarrhea   no nausea   no vomiting   no blood in stool  :    no difficulty urinating   no dysuria   no oliguria  Musculoskeletal:    arthralgias (b/l hips)   no myalgias   no decreased ROM  Hematologic:    does not bruise/bleed easily   no excessive bleeding   no history of blood transfusion   no blood clots  Skin:   no skin changes   no sores/wound   no rash      Physical Exam  Constitutional:       General: She is not in acute distress.     Appearance: Normal appearance. She is not ill-appearing, toxic-appearing or diaphoretic.   HENT:      Head: Normocephalic and atraumatic.      Nose: Nose normal. No congestion or rhinorrhea.      Mouth/Throat:      Mouth: Mucous membranes are moist.      Pharynx: No posterior oropharyngeal erythema.   Eyes:      Extraocular Movements: Extraocular movements intact.      Conjunctiva/sclera: Conjunctivae normal.   Cardiovascular:      Rate and Rhythm: Normal rate and regular rhythm.      Pulses: Normal pulses.      Heart sounds: Normal heart sounds. No murmur  "heard.     No friction rub. No gallop.   Pulmonary:      Effort: Pulmonary effort is normal. No respiratory distress.      Breath sounds: Normal breath sounds. No stridor. No wheezing or rhonchi.   Abdominal:      General: Bowel sounds are normal. There is no distension.      Palpations: Abdomen is soft. There is no mass.      Tenderness: There is no abdominal tenderness. There is no guarding or rebound.      Hernia: No hernia is present.   Musculoskeletal:         General: No swelling, tenderness, deformity or signs of injury. Normal range of motion.      Cervical back: Normal range of motion and neck supple. No rigidity or tenderness.   Skin:     General: Skin is warm and dry.      Coloration: Skin is not jaundiced or pale.      Findings: No bruising, erythema, lesion or rash.   Neurological:      General: No focal deficit present.      Mental Status: She is alert and oriented to person, place, and time.      Cranial Nerves: No cranial nerve deficit.      Sensory: No sensory deficit.      Motor: No weakness.      Coordination: Coordination normal.   Psychiatric:         Mood and Affect: Mood normal.         Behavior: Behavior normal.          PAT AIRWAY:   Airway:     Mallampati::  II    Neck ROM::  Full   Multiple missing teeth on top and bottom      Visit Vitals  /79   Pulse 73   Temp 36.6 °C (97.9 °F)   Resp 18   Ht 1.6 m (5' 3\")   Wt 100 kg (220 lb 10.9 oz)   SpO2 97%   BMI 39.09 kg/m²   OB Status Postmenopausal   Smoking Status Never   BSA 2.11 m²      DASI Risk Score    No data to display       Caprini DVT Assessment    No data to display       Modified Frailty Index    No data to display       CHADS2 Stroke Risk  Current as of 20 minutes ago        N/A 3 - 100%: High Risk   2 - 3%: Medium Risk   0 - 2%: Low Risk     Last Change: N/A          This score determines the patient's risk of having a stroke if the patient has atrial fibrillation.        This score is not applicable to this patient. " Components are not calculated.          Revised Cardiac Risk Index    No data to display       Apfel Simplified Score    No data to display       Risk Analysis Index Results This Encounter    No data found in the last 1 encounters.       LABS:  Lab Results   Component Value Date    WBC 5.9 12/12/2023    HGB 12.3 12/12/2023    HCT 39.6 12/12/2023    MCV 92 12/12/2023     (H) 12/12/2023      Lab Results   Component Value Date    GLUCOSE 79 12/12/2023    CALCIUM 9.9 12/12/2023     12/12/2023    K 4.2 12/12/2023    CO2 29 12/12/2023     12/12/2023    BUN 10 12/12/2023    CREATININE 0.76 12/12/2023        EKG 12/12/23  NSR  Minimal voltage criteria for LVH, may be normal variant  Borderline EKG  Vent rate = 70 bpm      Assessment and Plan:       Patient is a 68-year-old female scheduled for a Left Breast Magseed Partial Mastectomy; [place] Lymph Node Biopsy - Left  with Dr. Luis on 12/13/23.    Patient has no active cardiac symptoms.   Patient denies any chest pain, tightness, heaviness, pressure, radiating pain, palpitations, irregular heartbeats, lightheadedness, cough, congestion, shortness of breath, GRAY, PND, near syncope, weight loss or gain.     RCRI  0  , 3.9 % Risk of MACE    Cardiac:  HTN  Encouraged lifestyle modifications, low-sodium diet, and increase activity as tolerated.  Monitor BP and follow up with managing physician for readings sustaining >140/90.     Pulmonary:  Asthma - cont inhalers as prescribed including on dos     Hematology:  Thrombocytosis  9/20/22   12/12/23 PLT = 487    Patient instructed to ambulate as soon as possible postoperatively to decrease thromboembolic risk.   Initiate mechanical DVT prophylaxis as soon as possible and initiate chemical prophylaxis when deemed safe from a bleeding standpoint post surgery.     LABS: CBC, BMP and EKG ordered. Lab results reviewed and show thrombocytosis which is stable.     STOP BANG: >50, obese , HTN =3    Caprini:  7    Risk assessment complete.  Patient is scheduled for a intermediate surgical risk procedure.        Preoperative medication instructions were provided and reviewed with the patient.  Any additional testing or evaluation was explained to the patient.  Nothing by mouth instructions were discussed and patient's questions were answered prior to conclusion to this encounter.  Patient verbalized understanding of preoperative instructions given in preadmission testing; discharge instructions available in EMR.    This note was dictated by a speech recognition.  Minor errors may have been detected in a speech recognition.

## 2023-12-13 ENCOUNTER — APPOINTMENT (OUTPATIENT)
Dept: RADIOLOGY | Facility: HOSPITAL | Age: 68
End: 2023-12-13
Payer: MEDICARE

## 2023-12-13 ENCOUNTER — HOSPITAL ENCOUNTER (OUTPATIENT)
Facility: HOSPITAL | Age: 68
Setting detail: OUTPATIENT SURGERY
Discharge: HOME | End: 2023-12-13
Attending: SURGERY | Admitting: SURGERY
Payer: MEDICARE

## 2023-12-13 ENCOUNTER — ANCILLARY PROCEDURE (OUTPATIENT)
Dept: RADIOLOGY | Facility: CLINIC | Age: 68
End: 2023-12-13
Payer: MEDICARE

## 2023-12-13 ENCOUNTER — HOSPITAL ENCOUNTER (OUTPATIENT)
Dept: RADIOLOGY | Facility: HOSPITAL | Age: 68
Setting detail: OUTPATIENT SURGERY
End: 2023-12-13
Payer: MEDICARE

## 2023-12-13 ENCOUNTER — HOSPITAL ENCOUNTER (OUTPATIENT)
Dept: RADIOLOGY | Facility: HOSPITAL | Age: 68
Setting detail: OUTPATIENT SURGERY
Discharge: HOME | End: 2023-12-13
Payer: MEDICARE

## 2023-12-13 ENCOUNTER — ANESTHESIA (OUTPATIENT)
Dept: OPERATING ROOM | Facility: HOSPITAL | Age: 68
End: 2023-12-13
Payer: MEDICARE

## 2023-12-13 VITALS
HEART RATE: 71 BPM | TEMPERATURE: 97.3 F | WEIGHT: 220.02 LBS | BODY MASS INDEX: 38.98 KG/M2 | OXYGEN SATURATION: 96 % | SYSTOLIC BLOOD PRESSURE: 134 MMHG | DIASTOLIC BLOOD PRESSURE: 75 MMHG | HEIGHT: 63 IN | RESPIRATION RATE: 12 BRPM

## 2023-12-13 DIAGNOSIS — R92.8 OTHER ABNORMAL AND INCONCLUSIVE FINDINGS ON DIAGNOSTIC IMAGING OF BREAST: ICD-10-CM

## 2023-12-13 DIAGNOSIS — Z17.0 MALIGNANT NEOPLASM OF LOWER-OUTER QUADRANT OF LEFT BREAST OF FEMALE, ESTROGEN RECEPTOR POSITIVE (MULTI): Primary | ICD-10-CM

## 2023-12-13 DIAGNOSIS — Z17.0 MALIGNANT NEOPLASM OF LOWER-OUTER QUADRANT OF LEFT BREAST OF FEMALE, ESTROGEN RECEPTOR POSITIVE (MULTI): ICD-10-CM

## 2023-12-13 DIAGNOSIS — C50.512 MALIGNANT NEOPLASM OF LOWER-OUTER QUADRANT OF LEFT BREAST OF FEMALE, ESTROGEN RECEPTOR POSITIVE (MULTI): Primary | ICD-10-CM

## 2023-12-13 DIAGNOSIS — C50.512 MALIGNANT NEOPLASM OF LOWER-OUTER QUADRANT OF LEFT BREAST OF FEMALE, ESTROGEN RECEPTOR POSITIVE (MULTI): ICD-10-CM

## 2023-12-13 DIAGNOSIS — G89.18 POST-OPERATIVE PAIN: ICD-10-CM

## 2023-12-13 PROCEDURE — 7100000010 HC PHASE TWO TIME - EACH INCREMENTAL 1 MINUTE: Performed by: SURGERY

## 2023-12-13 PROCEDURE — 3430000001 HC RX 343 DIAGNOSTIC RADIOPHARMACEUTICALS: Performed by: SURGERY

## 2023-12-13 PROCEDURE — 2500000004 HC RX 250 GENERAL PHARMACY W/ HCPCS (ALT 636 FOR OP/ED): Performed by: REGISTERED NURSE

## 2023-12-13 PROCEDURE — 96372 THER/PROPH/DIAG INJ SC/IM: CPT | Performed by: SURGERY

## 2023-12-13 PROCEDURE — 38792 RA TRACER ID OF SENTINL NODE: CPT | Performed by: NUCLEAR MEDICINE

## 2023-12-13 PROCEDURE — 3700000001 HC GENERAL ANESTHESIA TIME - INITIAL BASE CHARGE: Performed by: SURGERY

## 2023-12-13 PROCEDURE — 38900 IO MAP OF SENT LYMPH NODE: CPT | Performed by: SURGERY

## 2023-12-13 PROCEDURE — 7100000001 HC RECOVERY ROOM TIME - INITIAL BASE CHARGE: Performed by: SURGERY

## 2023-12-13 PROCEDURE — 88307 TISSUE EXAM BY PATHOLOGIST: CPT | Mod: TC,SUR,AHULAB | Performed by: SURGERY

## 2023-12-13 PROCEDURE — 2500000005 HC RX 250 GENERAL PHARMACY W/O HCPCS: Performed by: SURGERY

## 2023-12-13 PROCEDURE — 19301 PARTIAL MASTECTOMY: CPT | Performed by: SURGERY

## 2023-12-13 PROCEDURE — 7100000009 HC PHASE TWO TIME - INITIAL BASE CHARGE: Performed by: SURGERY

## 2023-12-13 PROCEDURE — 76098 X-RAY EXAM SURGICAL SPECIMEN: CPT | Performed by: RADIOLOGY

## 2023-12-13 PROCEDURE — A38525 PR BX/REMV,LYMPH NODE,DEEP AXILL: Performed by: REGISTERED NURSE

## 2023-12-13 PROCEDURE — 3600000009 HC OR TIME - EACH INCREMENTAL 1 MINUTE - PROCEDURE LEVEL FOUR: Performed by: SURGERY

## 2023-12-13 PROCEDURE — 38525 BIOPSY/REMOVAL LYMPH NODES: CPT | Performed by: SURGERY

## 2023-12-13 PROCEDURE — 3700000002 HC GENERAL ANESTHESIA TIME - EACH INCREMENTAL 1 MINUTE: Performed by: SURGERY

## 2023-12-13 PROCEDURE — 7100000002 HC RECOVERY ROOM TIME - EACH INCREMENTAL 1 MINUTE: Performed by: SURGERY

## 2023-12-13 PROCEDURE — 2500000005 HC RX 250 GENERAL PHARMACY W/O HCPCS: Performed by: REGISTERED NURSE

## 2023-12-13 PROCEDURE — 2500000004 HC RX 250 GENERAL PHARMACY W/ HCPCS (ALT 636 FOR OP/ED): Performed by: ANESTHESIOLOGY

## 2023-12-13 PROCEDURE — A38525 PR BX/REMV,LYMPH NODE,DEEP AXILL: Performed by: ANESTHESIOLOGY

## 2023-12-13 PROCEDURE — 3600000004 HC OR TIME - INITIAL BASE CHARGE - PROCEDURE LEVEL FOUR: Performed by: SURGERY

## 2023-12-13 PROCEDURE — 2500000001 HC RX 250 WO HCPCS SELF ADMINISTERED DRUGS (ALT 637 FOR MEDICARE OP): Performed by: REGISTERED NURSE

## 2023-12-13 PROCEDURE — 2500000004 HC RX 250 GENERAL PHARMACY W/ HCPCS (ALT 636 FOR OP/ED): Performed by: SURGERY

## 2023-12-13 PROCEDURE — 38792 RA TRACER ID OF SENTINL NODE: CPT | Performed by: SURGERY

## 2023-12-13 PROCEDURE — 76098 X-RAY EXAM SURGICAL SPECIMEN: CPT

## 2023-12-13 PROCEDURE — 2500000002 HC RX 250 W HCPCS SELF ADMINISTERED DRUGS (ALT 637 FOR MEDICARE OP, ALT 636 FOR OP/ED): Performed by: PHARMACIST

## 2023-12-13 PROCEDURE — A9520 TC99 TILMANOCEPT DIAG 0.5MCI: HCPCS | Performed by: SURGERY

## 2023-12-13 PROCEDURE — 38792 RA TRACER ID OF SENTINL NODE: CPT

## 2023-12-13 PROCEDURE — 88307 TISSUE EXAM BY PATHOLOGIST: CPT | Performed by: PATHOLOGY

## 2023-12-13 PROCEDURE — 2720000007 HC OR 272 NO HCPCS: Performed by: SURGERY

## 2023-12-13 RX ORDER — HYDROMORPHONE HYDROCHLORIDE 1 MG/ML
INJECTION, SOLUTION INTRAMUSCULAR; INTRAVENOUS; SUBCUTANEOUS AS NEEDED
Status: DISCONTINUED | OUTPATIENT
Start: 2023-12-13 | End: 2023-12-13

## 2023-12-13 RX ORDER — SODIUM CHLORIDE 9 MG/ML
100 INJECTION, SOLUTION INTRAVENOUS CONTINUOUS
Status: DISCONTINUED | OUTPATIENT
Start: 2023-12-13 | End: 2023-12-13 | Stop reason: HOSPADM

## 2023-12-13 RX ORDER — LIDOCAINE HYDROCHLORIDE 20 MG/ML
INJECTION, SOLUTION EPIDURAL; INFILTRATION; INTRACAUDAL; PERINEURAL AS NEEDED
Status: DISCONTINUED | OUTPATIENT
Start: 2023-12-13 | End: 2023-12-13

## 2023-12-13 RX ORDER — CEFAZOLIN 1 G/1
INJECTION, POWDER, FOR SOLUTION INTRAVENOUS AS NEEDED
Status: DISCONTINUED | OUTPATIENT
Start: 2023-12-13 | End: 2023-12-13

## 2023-12-13 RX ORDER — MIDAZOLAM HYDROCHLORIDE 1 MG/ML
INJECTION INTRAMUSCULAR; INTRAVENOUS AS NEEDED
Status: DISCONTINUED | OUTPATIENT
Start: 2023-12-13 | End: 2023-12-13

## 2023-12-13 RX ORDER — DEXAMETHASONE SODIUM PHOSPHATE 4 MG/ML
INJECTION, SOLUTION INTRA-ARTICULAR; INTRALESIONAL; INTRAMUSCULAR; INTRAVENOUS; SOFT TISSUE AS NEEDED
Status: DISCONTINUED | OUTPATIENT
Start: 2023-12-13 | End: 2023-12-13

## 2023-12-13 RX ORDER — ACETAMINOPHEN 325 MG/1
650 TABLET ORAL EVERY 4 HOURS PRN
Status: DISCONTINUED | OUTPATIENT
Start: 2023-12-13 | End: 2023-12-13 | Stop reason: HOSPADM

## 2023-12-13 RX ORDER — LIDOCAINE HYDROCHLORIDE 10 MG/ML
0.1 INJECTION, SOLUTION EPIDURAL; INFILTRATION; INTRACAUDAL; PERINEURAL ONCE
Status: DISCONTINUED | OUTPATIENT
Start: 2023-12-13 | End: 2023-12-13 | Stop reason: HOSPADM

## 2023-12-13 RX ORDER — OXYCODONE HYDROCHLORIDE 5 MG/1
5 TABLET ORAL EVERY 4 HOURS PRN
Status: DISCONTINUED | OUTPATIENT
Start: 2023-12-13 | End: 2023-12-13 | Stop reason: HOSPADM

## 2023-12-13 RX ORDER — ISOSULFAN BLUE 50 MG/5ML
INJECTION, SOLUTION SUBCUTANEOUS AS NEEDED
Status: DISCONTINUED | OUTPATIENT
Start: 2023-12-13 | End: 2023-12-13 | Stop reason: HOSPADM

## 2023-12-13 RX ORDER — SUCCINYLCHOLINE CHLORIDE 20 MG/ML
INJECTION INTRAMUSCULAR; INTRAVENOUS AS NEEDED
Status: DISCONTINUED | OUTPATIENT
Start: 2023-12-13 | End: 2023-12-13

## 2023-12-13 RX ORDER — OXYCODONE HYDROCHLORIDE 5 MG/1
5 TABLET ORAL EVERY 6 HOURS PRN
Qty: 10 TABLET | Refills: 0 | Status: SHIPPED | OUTPATIENT
Start: 2023-12-13 | End: 2023-12-20

## 2023-12-13 RX ORDER — FENTANYL CITRATE 50 UG/ML
12.5 INJECTION, SOLUTION INTRAMUSCULAR; INTRAVENOUS EVERY 5 MIN PRN
Status: DISCONTINUED | OUTPATIENT
Start: 2023-12-13 | End: 2023-12-13 | Stop reason: HOSPADM

## 2023-12-13 RX ORDER — SODIUM CHLORIDE, SODIUM LACTATE, POTASSIUM CHLORIDE, CALCIUM CHLORIDE 600; 310; 30; 20 MG/100ML; MG/100ML; MG/100ML; MG/100ML
100 INJECTION, SOLUTION INTRAVENOUS CONTINUOUS
Status: DISCONTINUED | OUTPATIENT
Start: 2023-12-13 | End: 2023-12-13 | Stop reason: HOSPADM

## 2023-12-13 RX ORDER — SODIUM CHLORIDE, SODIUM LACTATE, POTASSIUM CHLORIDE, CALCIUM CHLORIDE 600; 310; 30; 20 MG/100ML; MG/100ML; MG/100ML; MG/100ML
50 INJECTION, SOLUTION INTRAVENOUS CONTINUOUS
Status: DISCONTINUED | OUTPATIENT
Start: 2023-12-13 | End: 2023-12-13 | Stop reason: HOSPADM

## 2023-12-13 RX ORDER — ONDANSETRON HYDROCHLORIDE 2 MG/ML
4 INJECTION, SOLUTION INTRAVENOUS ONCE AS NEEDED
Status: DISCONTINUED | OUTPATIENT
Start: 2023-12-13 | End: 2023-12-13 | Stop reason: HOSPADM

## 2023-12-13 RX ORDER — ONDANSETRON HYDROCHLORIDE 2 MG/ML
INJECTION, SOLUTION INTRAVENOUS AS NEEDED
Status: DISCONTINUED | OUTPATIENT
Start: 2023-12-13 | End: 2023-12-13

## 2023-12-13 RX ORDER — PROPOFOL 10 MG/ML
INJECTION, EMULSION INTRAVENOUS AS NEEDED
Status: DISCONTINUED | OUTPATIENT
Start: 2023-12-13 | End: 2023-12-13

## 2023-12-13 RX ORDER — LIDOCAINE HYDROCHLORIDE 40 MG/ML
SOLUTION TOPICAL AS NEEDED
Status: DISCONTINUED | OUTPATIENT
Start: 2023-12-13 | End: 2023-12-13

## 2023-12-13 RX ORDER — PHENYLEPHRINE HCL IN 0.9% NACL 1 MG/10 ML
SYRINGE (ML) INTRAVENOUS AS NEEDED
Status: DISCONTINUED | OUTPATIENT
Start: 2023-12-13 | End: 2023-12-13

## 2023-12-13 RX ORDER — FENTANYL CITRATE 50 UG/ML
50 INJECTION, SOLUTION INTRAMUSCULAR; INTRAVENOUS EVERY 5 MIN PRN
Status: DISCONTINUED | OUTPATIENT
Start: 2023-12-13 | End: 2023-12-13 | Stop reason: HOSPADM

## 2023-12-13 RX ORDER — ALBUTEROL SULFATE 0.83 MG/ML
2.5 SOLUTION RESPIRATORY (INHALATION) ONCE AS NEEDED
Status: COMPLETED | OUTPATIENT
Start: 2023-12-13 | End: 2023-12-13

## 2023-12-13 RX ORDER — FENTANYL CITRATE 50 UG/ML
INJECTION, SOLUTION INTRAMUSCULAR; INTRAVENOUS AS NEEDED
Status: DISCONTINUED | OUTPATIENT
Start: 2023-12-13 | End: 2023-12-13

## 2023-12-13 RX ADMIN — Medication 200 MCG: at 11:23

## 2023-12-13 RX ADMIN — TILMANOCEPT 0.8 MILLICURIE: KIT at 10:15

## 2023-12-13 RX ADMIN — PROPOFOL 50 MG: 10 INJECTION, EMULSION INTRAVENOUS at 11:07

## 2023-12-13 RX ADMIN — LIDOCAINE HYDROCHLORIDE 4 ML: 40 SOLUTION TOPICAL at 10:37

## 2023-12-13 RX ADMIN — LIDOCAINE HYDROCHLORIDE 40 MG: 20 INJECTION, SOLUTION EPIDURAL; INFILTRATION; INTRACAUDAL; PERINEURAL at 10:34

## 2023-12-13 RX ADMIN — Medication 200 MCG: at 11:41

## 2023-12-13 RX ADMIN — PROPOFOL 150 MG: 10 INJECTION, EMULSION INTRAVENOUS at 10:34

## 2023-12-13 RX ADMIN — SUCCINYLCHOLINE CHLORIDE 100 MG: 20 INJECTION, SOLUTION INTRAMUSCULAR; INTRAVENOUS at 10:35

## 2023-12-13 RX ADMIN — DEXAMETHASONE SODIUM PHOSPHATE 4 MG: 4 INJECTION, SOLUTION INTRA-ARTICULAR; INTRALESIONAL; INTRAMUSCULAR; INTRAVENOUS; SOFT TISSUE at 10:44

## 2023-12-13 RX ADMIN — MIDAZOLAM HYDROCHLORIDE 2 MG: 1 INJECTION, SOLUTION INTRAMUSCULAR; INTRAVENOUS at 10:25

## 2023-12-13 RX ADMIN — FENTANYL CITRATE 50 MCG: 50 INJECTION, SOLUTION INTRAMUSCULAR; INTRAVENOUS at 10:50

## 2023-12-13 RX ADMIN — ONDANSETRON 4 MG: 2 INJECTION, SOLUTION INTRAMUSCULAR; INTRAVENOUS at 11:31

## 2023-12-13 RX ADMIN — SODIUM CHLORIDE, POTASSIUM CHLORIDE, SODIUM LACTATE AND CALCIUM CHLORIDE: 600; 310; 30; 20 INJECTION, SOLUTION INTRAVENOUS at 10:23

## 2023-12-13 RX ADMIN — ALBUTEROL SULFATE 2.5 MG: 2.5 SOLUTION RESPIRATORY (INHALATION) at 09:06

## 2023-12-13 RX ADMIN — CEFAZOLIN 2 G: 330 INJECTION, POWDER, FOR SOLUTION INTRAMUSCULAR; INTRAVENOUS at 10:44

## 2023-12-13 RX ADMIN — Medication 200 MCG: at 10:56

## 2023-12-13 RX ADMIN — HYDROMORPHONE HYDROCHLORIDE 0.4 MG: 1 INJECTION, SOLUTION INTRAMUSCULAR; INTRAVENOUS; SUBCUTANEOUS at 11:07

## 2023-12-13 RX ADMIN — Medication 200 MCG: at 11:31

## 2023-12-13 RX ADMIN — FENTANYL CITRATE 50 MCG: 50 INJECTION, SOLUTION INTRAMUSCULAR; INTRAVENOUS at 10:34

## 2023-12-13 SDOH — HEALTH STABILITY: MENTAL HEALTH: CURRENT SMOKER: 0

## 2023-12-13 ASSESSMENT — PAIN - FUNCTIONAL ASSESSMENT
PAIN_FUNCTIONAL_ASSESSMENT: 0-10

## 2023-12-13 ASSESSMENT — PAIN SCALES - GENERAL
PAINLEVEL_OUTOF10: 0 - NO PAIN
PAIN_LEVEL: 2
PAINLEVEL_OUTOF10: 0 - NO PAIN

## 2023-12-13 ASSESSMENT — COLUMBIA-SUICIDE SEVERITY RATING SCALE - C-SSRS
1. IN THE PAST MONTH, HAVE YOU WISHED YOU WERE DEAD OR WISHED YOU COULD GO TO SLEEP AND NOT WAKE UP?: NO
6. HAVE YOU EVER DONE ANYTHING, STARTED TO DO ANYTHING, OR PREPARED TO DO ANYTHING TO END YOUR LIFE?: NO
2. HAVE YOU ACTUALLY HAD ANY THOUGHTS OF KILLING YOURSELF?: NO

## 2023-12-13 NOTE — POST-PROCEDURE NOTE
1350 Nurse help pt to get dressed this time.  1405 Discharge instruction reviewed with pt and family by nurse  1412 IV removed

## 2023-12-13 NOTE — ANESTHESIA PREPROCEDURE EVALUATION
Patient: Betty Mendieta    Procedure Information       Date/Time: 2330    Procedure: Left Breast Magseed Partial Mastectomy; [place] Lymph Node Biopsy (Left)    Location: [facility] A OR 03 / [facility] A OR    Surgeons: Ruby Luis MD            Relevant Problems   Anesthesia  Past Surgical History:  2023: BI US GUIDED BREAST LOCALIZATION LEFT; Left      Comment:  BI US GUIDED BREAST LOCALIZATION LEFT 2023 Donna Ramirez MD [facility]  No date: BREAST BIOPSY; Right      Comment:  benign  2023: BREAST BIOPSY; Left  No date:  SECTION, CLASSIC      Comment:  x 2  No date: TOTAL KNEE ARTHROPLASTY; Left  No date: TOTAL KNEE ARTHROPLASTY; Right  No date: TUBAL LIGATION      Comment:  Tubal Ligation     (+) Left corneal abrasion      Cardiovascular   (+) Hypertension      Endocrine   (+) Class 2 severe obesity with body mass index (BMI) of 35 to 39.9 with serious comorbidity (CMS/HCC)      GI   (+) Diverticulosis of large intestine with hemorrhage   (+) Gastroesophageal reflux disease      Pulmonary   (+) Asthma      Hematology   (+) Anemia      Musculoskeletal   (+) Primary osteoarthritis of left knee      Other  Past Medical History:  No date: Asthma  No date: Breast cancer, left breast (CMS/HCC)  No date: Cataract  No date: Diverticulosis  10/26/2018: Essential (primary) hypertension      Comment:  Benign essential hypertension  No date: GERD (gastroesophageal reflux disease)  2018: Ocular hypertension, unspecified eye      Comment:  Ocular hypertension, unspecified laterality  No date: Preglaucoma, unspecified, unspecified eye      Comment:  Glaucoma suspect  2016: Radiculopathy, lumbar region      Comment:  Lumbar radiculopathy  2021: Unilateral primary osteoarthritis, left knee      Comment:  Primary localized osteoarthrosis of left lower leg  No date: Vitamin D deficiency         Clinical information reviewed:   Tobacco  Allergies  Meds   Med Hx   Surg Hx  OB Status  Fam Hx  Soc   Hx        NPO Detail:  NPO/Void Status  Date of Last Liquid: 12/12/23  Time of Last Liquid: 2000  Date of Last Solid: 12/12/23  Time of Last Solid: 2000         Physical Exam    Airway  Mallampati: II  TM distance: >3 FB     Cardiovascular   Rhythm: regular  Rate: normal     Dental - normal exam     Pulmonary - normal exam  Breath sounds clear to auscultation     Abdominal            Anesthesia Plan    ASA 2     general     The patient is not a current smoker.    Anesthetic plan and risks discussed with patient.  Use of blood products discussed with patient who consented to blood products.    Plan discussed with CRNA.

## 2023-12-13 NOTE — DISCHARGE INSTRUCTIONS
POST-OP INSTRUCTIONS FOR BREAST SURGERY PATIENTS: DR. KANWAL LUIS    Activity:    Avoid direct impact to the surgical site.  No heavy lifting (greater than 10 lbs) or strenuous activity with the arm on the surgical side until evaluated at post-op appointment.    Wound care:    Ice pack, if desired, on and off in 15 minute intervals.  A supportive bra can be worn for comfort and support.  You may start showering normally tomorrow. Don't scrub and pat the surgical site dry.  No swimming or submerging the surgical site in a hot tub or bath for 2 weeks.  Outer dressing to be removed in 2 days.  Leave steri-strips intact for 1 week.  Observe the surgical site for signs of bleeding or infection. (Some swelling or bruising is anticipated.  A small amount of blood or an air bubble beneath the dressing is not a cause for concern.)    Call physician with abnormal findings: Progressive swelling, increasing pain, bright red skin discoloration, chills or fever (Temperature 101.5 or higher).    Follow-up:  post-op appointment with Dr. Luis was previously scheduled.  Pathology report will be discussed at that visit.    Pain:    Medications may be prescribed (i.e. Tylenol #3, Norco, Percocet, Vicodin, etc...). You may also use over-the-counter medication such as Tylenol (acetaminophen) or Advil (ibuprofen).  Remember that some prescription medications contain Tylenol (acetaminophen). If you are taking a prescription medication that contains Tylenol (acetaminophen), do not take additional over-the-counter Tylenol.  All pain medications can be constipating; remember to drink plenty of fluids and use stool softener and/or laxatives as needed. These are available over-the-counter.  Pain control is best when medication is taken before pain becomes severe.    Medication refills:    Medications cannot be refilled after business hours or on weekends.      Questions / Concerns:  Call Clinic [phone], option 2.

## 2023-12-13 NOTE — PERIOPERATIVE NURSING NOTE
Patient to PACU [facility] post left breast partial mastectomy with magseed ans sentinel lymph  node biopsy, sedated on SFM, wound to left breast is clean, dry and intact with steri-strips, 4x4, tegaderm, bra. VSS    1215: VSS    1219: 4L NC applied because patient desaturated to     1230: Weaned to 3L NC    1258: Patient family updated    1315: Patient weaned to RA, introducing the incentive spirometer.    1320: Hand-off report given to Azam

## 2023-12-13 NOTE — BRIEF OP NOTE
Date: 2023  OR Location: [facility] OR    Name: Betty Mendieta, : 1955, Age: 68 y.o., MRN: [id number], Sex: female    Diagnosis  Pre-op Diagnosis     * Malignant neoplasm of lower-outer quadrant of left breast of female, estrogen receptor positive (CMS/HCC) [C50.512, Z17.0] Post-op Diagnosis     * Malignant neoplasm of lower-outer quadrant of left breast of female, estrogen receptor positive (CMS/HCC) [C50.512, Z17.0]     Procedures  Left Breast Magseed Partial Mastectomy; [place] Lymph Node Biopsy  11497 - AR MASTECTOMY PARTIAL    AR BX/EXC LYMPH NODE OPEN DEEP AXILLARY NODE [09418]  AR INJ RADIOACTIVE TRACER FOR ID OF SENTINEL NODE [[id number]]  Surgeons      * Ruby Luis - Primary    Resident/Fellow/Other Assistant:  Surgeon(s) and Role:    Procedure Summary  Anesthesia: General  ASA: ASA status not filed in the log.  Anesthesia Staff: Anesthesiologist: Brendon Gonzales MD  CRNA: ANTOINE Grant-CRNA  C-AA: CHASITY Arroyo  Estimated Blood Loss: 30mL  Intra-op Medications:   Medication Name Total Dose   isosulfan blue (Lumphazurin) 1 % injection 2 mL   bupivacaine PF (Marcaine) 0.25 % (2.5 mg/mL) 20 mL, lidocaine (Xylocaine) 20 mL syringe 30 mL   lactated Ringer's infusion Cannot be calculated              Anesthesia Record               Intraprocedure I/O Totals          Output    Est. Blood Loss 30 mL    Total Output 30 mL          Specimen:   ID Type Source Tests Collected by Time   1 : LEFT BREAST MAGSEED LOCALIZED PARTIAL MASTECTOMY Tissue BREAST LUMPECTOMY LEFT SURGICAL PATHOLOGY EXAM Ruby Luis MD 2023 1105   2 : LEFT BREAST NEW ANTERIOR MARGIN INK MARKED NEW MARGIN PAINTED BLACK Tissue BREAST MARGIN LEFT SURGICAL PATHOLOGY EXAM Ruby Luis MD 2023 1110   3 : LEFT BREAST NEW INFERIOR MARGIN INK MARKED NEW MARGIN PAINTED BLACK Tissue BREAST MARGIN LEFT SURGICAL PATHOLOGY EXAM Ruby Luis MD 2023 1110   4 : LEFT BREAST NEW LATERAL MARGIN INK MARKED NEW MARGIN PAINTED BLACK  Tissue BREAST MARGIN LEFT SURGICAL PATHOLOGY EXAM Ruby Luis MD 12/13/2023 1110   5 : LEFT BREAST NEW MEDIAL MARGIN INK MARKED NEW MARGIN PAINTED BLACK Tissue BREAST MARGIN LEFT SURGICAL PATHOLOGY EXAM Ruby Luis MD 12/13/2023 1110   6 : LEFT BREAST NEW POSTERIOR MARGIN INK MARKED NEW MARGIN PAINTED BLACK Tissue BREAST MARGIN LEFT SURGICAL PATHOLOGY EXAM Ruby Luis MD 12/13/2023 1110   7 : LEFT BREAST NEW SUPERIOR MARGIN INK MARKED NEW MARGIN PAINTED BLACK Tissue BREAST MARGIN LEFT SURGICAL PATHOLOGY EXAM Ruby Luis MD 12/13/2023 1110   8 : LEFT AXILLARY SENTINEL LYMPH NODES Tissue SENTINEL LYMPH NODE BREAST LEFT SURGICAL PATHOLOGY EXAM Ruby Luis MD 12/13/2023 1124        Staff:   Circulator: Shawna Oliver RN  Relief Scrub: Norma Clark  Scrub Person: Anaid DE LA CRUZ RN          Findings: clip, mag seed and mass in specimen xray    Complications:  None; patient tolerated the procedure well.     Disposition: PACU - hemodynamically stable.  Condition: stable  Specimens Collected:   ID Type Source Tests Collected by Time   1 : LEFT BREAST MAGSEED LOCALIZED PARTIAL MASTECTOMY Tissue BREAST LUMPECTOMY LEFT SURGICAL PATHOLOGY EXAM Ruby Luis MD 12/13/2023 1105   2 : LEFT BREAST NEW ANTERIOR MARGIN INK MARKED NEW MARGIN PAINTED BLACK Tissue BREAST MARGIN LEFT SURGICAL PATHOLOGY EXAM Ruby Luis MD 12/13/2023 1110   3 : LEFT BREAST NEW INFERIOR MARGIN INK MARKED NEW MARGIN PAINTED BLACK Tissue BREAST MARGIN LEFT SURGICAL PATHOLOGY EXAM Rbuy Luis MD 12/13/2023 1110   4 : LEFT BREAST NEW LATERAL MARGIN INK MARKED NEW MARGIN PAINTED BLACK Tissue BREAST MARGIN LEFT SURGICAL PATHOLOGY EXAM Ruby Luis MD 12/13/2023 1110   5 : LEFT BREAST NEW MEDIAL MARGIN INK MARKED NEW MARGIN PAINTED BLACK Tissue BREAST MARGIN LEFT SURGICAL PATHOLOGY EXAM Ruby Luis MD 12/13/2023 1110   6 : LEFT BREAST NEW POSTERIOR MARGIN INK MARKED NEW MARGIN PAINTED BLACK Tissue BREAST MARGIN LEFT SURGICAL PATHOLOGY EXAM Ruby Luis  MD 12/13/2023 1110   7 : LEFT BREAST NEW SUPERIOR MARGIN INK MARKED NEW MARGIN PAINTED BLACK Tissue BREAST MARGIN LEFT SURGICAL PATHOLOGY EXAM Ruby Luis MD 12/13/2023 1110   8 : LEFT AXILLARY SENTINEL LYMPH NODES Tissue SENTINEL LYMPH NODE BREAST LEFT SURGICAL PATHOLOGY EXAM Ruby Luis MD 12/13/2023 1124     Attending Attestation: I was present and scrubbed for the entire procedure.    Ruby Luis  Phone Number: [phone]

## 2023-12-13 NOTE — ANESTHESIA POSTPROCEDURE EVALUATION
Patient: Betty Mendieta    Procedure Summary       Date: 12/13/23 Room / Location: [facility] A OR 03 / [facility]    Anesthesia Start: 1025 Anesthesia Stop: 1217    Procedure: Left Breast Magseed Partial Mastectomy; [place] Lymph Node Biopsy (Left) Diagnosis:       Malignant neoplasm of lower-outer quadrant of left breast of female, estrogen receptor positive (CMS/HCC)      (Malignant neoplasm of lower-outer quadrant of left breast of female, estrogen receptor positive (CMS/HCC) [C50.512, Z17.0])    Surgeons: Ruby Luis MD Responsible Provider: Brendon Gonzales MD    Anesthesia Type: general ASA Status: 2            Anesthesia Type: general    Vitals Value Taken Time   /65 12/13/23 1330   Temp 36.3 °C (97.3 °F) 12/13/23 1330   Pulse 78 12/13/23 1330   Resp 14 12/13/23 1330   SpO2 94 % 12/13/23 1330       Anesthesia Post Evaluation    Patient location during evaluation: PACU  Patient participation: complete - patient participated  Level of consciousness: awake  Pain score: 2  Pain management: adequate  Multimodal analgesia pain management approach  Airway patency: patent  Cardiovascular status: acceptable, stable and hemodynamically stable  Respiratory status: acceptable, room air and spontaneous ventilation  Hydration status: acceptable  Postoperative Nausea and Vomiting: none        There were no known notable events for this encounter.

## 2023-12-13 NOTE — OP NOTE
Left Breast Magseed Partial Mastectomy; Cortez Lymph Node Biopsy (L) Operative Note     Date: 2023  OR Location: [facility] A OR    Name: Betty Mendieta, : 1955, Age: 68 y.o., MRN: [id number], Sex: female    Diagnosis  Pre-op Diagnosis     * Malignant neoplasm of lower-outer quadrant of left breast of female, estrogen receptor positive (CMS/HCC) [C50.512, Z17.0] Post-op Diagnosis     * Malignant neoplasm of lower-outer quadrant of left breast of female, estrogen receptor positive (CMS/HCC) [C50.512, Z17.0]     Procedures  Left Breast Magseed Partial Mastectomy; Cortez Lymph Node Biopsy  50982 - MO MASTECTOMY PARTIAL    MO BX/EXC LYMPH NODE OPEN DEEP AXILLARY NODE [37929]  MO INJ RADIOACTIVE TRACER FOR ID OF SENTINEL NODE [60938]  Surgeons      * Ruby Luis - Primary    Resident/Fellow/Other Assistant:  Surgeon(s) and Role:    Procedure Summary  Anesthesia: General  ASA: ASA status not filed in the log.  Anesthesia Staff: Anesthesiologist: Brendon Gonzales MD  CRNA: ANTOINE Grant-CRNA  C-AA: CHASITY Arroyo  Estimated Blood Loss: 30mL  Intra-op Medications:   Medication Name Total Dose   isosulfan blue (Lumphazurin) 1 % injection 2 mL   bupivacaine PF (Marcaine) 0.25 % (2.5 mg/mL) 20 mL, lidocaine (Xylocaine) 20 mL syringe 30 mL   lactated Ringer's infusion Cannot be calculated              Anesthesia Record               Intraprocedure I/O Totals          Output    Est. Blood Loss 30 mL    Total Output 30 mL          Specimen:   ID Type Source Tests Collected by Time   1 : LEFT BREAST MAGSEED LOCALIZED PARTIAL MASTECTOMY Tissue BREAST LUMPECTOMY LEFT SURGICAL PATHOLOGY EXAM Ruby Luis MD 2023 1105   2 : LEFT BREAST NEW ANTERIOR MARGIN INK MARKED NEW MARGIN PAINTED BLACK Tissue BREAST MARGIN LEFT SURGICAL PATHOLOGY EXAM Ruby Luis MD 2023 1110   3 : LEFT BREAST NEW INFERIOR MARGIN INK MARKED NEW MARGIN PAINTED BLACK Tissue BREAST MARGIN LEFT SURGICAL PATHOLOGY EXAM Ruby Luis MD  12/13/2023 1110   4 : LEFT BREAST NEW LATERAL MARGIN INK MARKED NEW MARGIN PAINTED BLACK Tissue BREAST MARGIN LEFT SURGICAL PATHOLOGY EXAM Ruby Luis MD 12/13/2023 1110   5 : LEFT BREAST NEW MEDIAL MARGIN INK MARKED NEW MARGIN PAINTED BLACK Tissue BREAST MARGIN LEFT SURGICAL PATHOLOGY EXAM Ruby Luis MD 12/13/2023 1110   6 : LEFT BREAST NEW POSTERIOR MARGIN INK MARKED NEW MARGIN PAINTED BLACK Tissue BREAST MARGIN LEFT SURGICAL PATHOLOGY EXAM Ruby Luis MD 12/13/2023 1110   7 : LEFT BREAST NEW SUPERIOR MARGIN INK MARKED NEW MARGIN PAINTED BLACK Tissue BREAST MARGIN LEFT SURGICAL PATHOLOGY EXAM Ruby Luis MD 12/13/2023 1110   8 : LEFT AXILLARY SENTINEL LYMPH NODES Tissue SENTINEL LYMPH NODE BREAST LEFT SURGICAL PATHOLOGY EXAM Ruby Luis MD 12/13/2023 1124        Staff:   Circulator: Shawna Oliver RN  Relief Scrub: Norma Clark  Scrub Person: Anaid DE LA CRUZ RN         Drains and/or Catheters: * None in log *    Tourniquet Times:         Implants:     Findings: clip, mag seed and mass in specimen xray    Indications: Betty Mendieta is an 68 y.o. female who is having surgery for Malignant neoplasm of lower-outer quadrant of left breast of female, estrogen receptor positive (CMS/HCC) [C50.512, Z17.0].     The patient was seen in the preoperative area. The risks, benefits, complications, treatment options, non-operative alternatives, expected recovery and outcomes were discussed with the patient. The possibilities of reaction to medication, pulmonary aspiration, injury to surrounding structures, bleeding, recurrent infection, the need for additional procedures, failure to diagnose a condition, and creating a complication requiring transfusion or operation were discussed with the patient. The patient concurred with the proposed plan, giving informed consent.  The site of surgery was properly noted/marked if necessary per policy. The patient has been actively warmed in preoperative area. Preoperative antibiotics  have been ordered and given within 1 hours of incision. Venous thrombosis prophylaxis have been ordered including bilateral sequential compression devices    Procedure Details:   Informed consent was obtained. The patient had previously received an injection of Tc99 in pre op. The surgical site marked and the Day of Surgery Update completed. The patient was taken to the operating room and positioned in a supine position on the operating room table. Anesthesia was induced without difficulty. SCDs were placed for DVT prophylaxis. Antibiotics were administered within 60 minutes prior to incision for surgical prophylaxis. Lower body Justina Hugger was applied to help maintain normothermia.     I reviewed my note and the appropriate films.  A surgical safety time-out was performed.     I injected 2 cc of methylene blue solution in the retroareolar area of the left breast and vigorously massaged the breast for 3 minutes. Using the sentimag probe, I approximated the location of the mag seed marking the lesion of concern.      The patient was sterilely prepped and draped in the usual fashion.       I then turned my attention to the left breast. The skin and surrounding tissue was anesthetized with local anesthetic. A  circumareolar skin incision was made with a 15 blade scalpel.  Subcutaneous tissues were sharply divided down to and into subcutaneous fat using Bovie cautery.  I dissected towards the mag seed. Mag seed location was confirmed throughout using the sentimag probe. I grasped the intended specimen with an Allis clamp.  The entire area of tissue surrounding the mag seed was excised en bloc with the mag seed intact. The specimen was oriented with a short stitch superiorly and a long stitch laterally, and labeled as left breast mag seed localized partial mastectomy. The specimen was inked per the vector kit.  Specimen radiograph was taken and reviewed by the radiologist. The clip was identified in the specimen and the  mag seed was intact.  Radiologically the margins appeared adequate.  The film was reviewed by radiology.     I then excised 6 cavity shave margins using the bovie and oriented the new margin in with black ink    I placed clips around the perimeter of the lumpectomy cavity and 2 clips in the posterior margin. The cavity was irrigated with sterile saline solution.  Hemostasis was achieved and confirmed with cautery.         Hemostasis was achieved with bovie.  The deep dermal layer was closed with interrupted 3-0 vicryl sutures.  The skin was closed with a running 4-0 monocryl subcuticular stitch.     I turned my attention to the left axilla.  A curvilinear incision was planned in the inferior aspect of the axilla.  The skin and surrounding tissue was anesthetized with local anesthetic.  The incision was made with a 15 blade scalpel.  The incision was deepened through the subcutaneous tissue with bovie.  I divided clavipectoral fascia and entered the axilla.  Using a combination of the hand held gamma counter, blue dye and palpation, I removed sentinel lymph nodes. There were no additional palpable lymph nodes. The axilla was irrigated with sterile saline solution.  Hemostasis was achieved and confirmed.  The axilla was closed in 3 layers.  Clavipectoral fascia was reapproximated with a running 2-0 vicryl stitch.  The deep dermal layer was closed with inverted, interrupted 3-0 vicryl stitches.  The skin was closed with a running 4-0 running monocryl suture.       A sterile dressing was applied.  A surgical bra was used for light compression.     Anesthesia was reversed and the patient was brought to the recovery room in stable condition having tolerated the procedure well.  There were no complications.  30 cc of 1% lidocaine/0.25% marcaine mixed was used throughout the case.       Complications:  None; patient tolerated the procedure well.    Disposition: PACU - hemodynamically stable.  Condition: stable     Starr  Node Biopsy for Breast Cancer - Left  Operation performed with curative intent Yes   Tracer(s) used to identify sentinel nodes in the upfront surgery (non-neoadjuvant) setting Dye and Radioactive tracer   Tracer(s) used to identify sentinel nodes in the neoadjuvant setting N/A   All nodes (colored or non-colored) present at the end of a dye-filled lymphatic channel were removed Yes   All significantly radioactive nodes were removed Yes   All palpably suspicious nodes were removed Yes   Biopsy-proven positive nodes marked with clips prior to chemotherapy were identified and removed N/A         Additional Details: posterior margin above pec fascia    Attending Attestation: I was present and scrubbed for the entire procedure.    Ruby Luis  Phone Number: [phone]

## 2023-12-13 NOTE — ANESTHESIA PROCEDURE NOTES
Airway  Date/Time: 12/13/2023 10:37 AM  Urgency: elective    Airway not difficult    Staffing  Performed: CRNA   Authorized by: Brendon Gonzales MD    Performed by: ANTOINE Grant-GENEVIEVE  Patient location during procedure: OR    Indications and Patient Condition  Indications for airway management: anesthesia  Spontaneous Ventilation: absent  Sedation level: deep  Preoxygenated: yes  Patient position: sniffing  Mask difficulty assessment: 1 - vent by mask  Planned trial extubation    Final Airway Details  Final airway type: endotracheal airway      Successful airway: ETT  Cuffed: yes   Successful intubation technique: direct laryngoscopy  Facilitating devices/methods: intubating stylet  Endotracheal tube insertion site: oral  Blade: King  Blade size: #4  ETT size (mm): 7.0  Cormack-Lehane Classification: grade IIa - partial view of glottis  Placement verified by: chest auscultation, capnometry and palpation of cuff   Measured from: lips  ETT to lips (cm): 21  Number of attempts at approach: 1

## 2023-12-14 ASSESSMENT — PAIN SCALES - GENERAL: PAINLEVEL_OUTOF10: 0 - NO PAIN

## 2023-12-18 DIAGNOSIS — C50.512 MALIGNANT NEOPLASM OF LOWER-OUTER QUADRANT OF LEFT BREAST OF FEMALE, ESTROGEN RECEPTOR POSITIVE (MULTI): Primary | ICD-10-CM

## 2023-12-18 DIAGNOSIS — Z17.0 MALIGNANT NEOPLASM OF LOWER-OUTER QUADRANT OF LEFT BREAST OF FEMALE, ESTROGEN RECEPTOR POSITIVE (MULTI): Primary | ICD-10-CM

## 2023-12-21 ENCOUNTER — TUMOR BOARD CONFERENCE (OUTPATIENT)
Dept: HEMATOLOGY/ONCOLOGY | Facility: HOSPITAL | Age: 68
End: 2023-12-21
Payer: MEDICARE

## 2023-12-25 NOTE — TUMOR BOARD NOTE
Pathology not ready for review, will review next week.    Yaya Taylor MD, PhD  Attending Physician

## 2023-12-26 DIAGNOSIS — C50.512 MALIGNANT NEOPLASM OF LOWER-OUTER QUADRANT OF LEFT BREAST OF FEMALE, ESTROGEN RECEPTOR POSITIVE (MULTI): Primary | ICD-10-CM

## 2023-12-26 DIAGNOSIS — Z17.0 MALIGNANT NEOPLASM OF LOWER-OUTER QUADRANT OF LEFT BREAST OF FEMALE, ESTROGEN RECEPTOR POSITIVE (MULTI): Primary | ICD-10-CM

## 2023-12-28 ENCOUNTER — APPOINTMENT (OUTPATIENT)
Dept: HEMATOLOGY/ONCOLOGY | Facility: HOSPITAL | Age: 68
End: 2023-12-28
Payer: MEDICARE

## 2023-12-29 ENCOUNTER — APPOINTMENT (OUTPATIENT)
Dept: SURGICAL ONCOLOGY | Facility: HOSPITAL | Age: 68
End: 2023-12-29
Payer: MEDICARE

## 2024-01-02 DIAGNOSIS — C50.512 MALIGNANT NEOPLASM OF LOWER-OUTER QUADRANT OF LEFT BREAST OF FEMALE, ESTROGEN RECEPTOR POSITIVE (MULTI): Primary | ICD-10-CM

## 2024-01-02 DIAGNOSIS — Z17.0 MALIGNANT NEOPLASM OF LOWER-OUTER QUADRANT OF LEFT BREAST OF FEMALE, ESTROGEN RECEPTOR POSITIVE (MULTI): Primary | ICD-10-CM

## 2024-01-03 LAB
LABORATORY COMMENT REPORT: NORMAL
PATH REPORT.COMMENTS IMP SPEC: NORMAL
PATH REPORT.FINAL DX SPEC: NORMAL
PATH REPORT.GROSS SPEC: NORMAL
PATH REPORT.RELEVANT HX SPEC: NORMAL
PATH REPORT.TOTAL CANCER: NORMAL
PATHOLOGY SYNOPTIC REPORT: NORMAL

## 2024-01-03 NOTE — TUMOR BOARD NOTE
MULTIDISCIPLINARY BREAST CANCER TUMOR BOARD CONFERENCE NOTE  Betty Mendieta was presented at Breast Cancer Tumor Board Conference  Conference date: 1/4/2024  Presenting Provider(s): Dr. Ruby Luis  Present at Conference: Medical Oncology, Radiation Oncology, Surgical Oncology, Radiology, and Pathology Representatives  Conference Review Type: Pathology Review    Tumor Board Stage: [id number]  Breast Cancer Stage IA    National Guidelines discussed: Yes    Systemic therapy: Yes, describe: based on Oncotype DX possible adjuvant chemotherapy, adjuvant endocrine therapy  Radiation therapy: Yes, describe: postoperative radiation  Surgical Resection: Yes,s/p left partial mastectomy with left axillary SLNB.  Genomic Testing: yes, Oncotype DX  Clinical Trial Eligible: yes, [id number]    Recommendations: Oncotype DX, Medical oncology for possible adjuvant chemotherapy pending Oncotype DX score and adjuvant endocrine therapy. Radiation oncology for postoperative radiation..    Referral Recommendations:  Radiation Oncology and Medical Oncology    Cancer Staging:  Cancer Staging   No matching staging information was found for the patient.   Disclaimer  S[facility] tumor board recommendations represent the consensus opinion of physicians present at a weekly patient care conference. The treating [facility] physician is not always present, and many of the physicians formulating the recommendation have not personally seen or examined the patient under discussion. It is understood that the treating [facility] physician considers the expertise of the Tumor Board Recommendation in formulating his/her plan for the patient. However, in many situations, based on individualized patient considerations, a different plan is determined by the treating physician to be the optimal medical management.     Yaya Taylor MD, PhD  Attending Physician

## 2024-01-04 ENCOUNTER — TUMOR BOARD CONFERENCE (OUTPATIENT)
Dept: HEMATOLOGY/ONCOLOGY | Facility: HOSPITAL | Age: 69
End: 2024-01-04
Payer: MEDICARE

## 2024-01-05 ENCOUNTER — OFFICE VISIT (OUTPATIENT)
Dept: SURGICAL ONCOLOGY | Facility: HOSPITAL | Age: 69
End: 2024-01-05
Payer: MEDICARE

## 2024-01-05 VITALS
WEIGHT: 220 LBS | BODY MASS INDEX: 38.97 KG/M2 | SYSTOLIC BLOOD PRESSURE: 142 MMHG | HEART RATE: 95 BPM | TEMPERATURE: 97.3 F | DIASTOLIC BLOOD PRESSURE: 94 MMHG

## 2024-01-05 DIAGNOSIS — C50.512 MALIGNANT NEOPLASM OF LOWER-OUTER QUADRANT OF LEFT BREAST OF FEMALE, ESTROGEN RECEPTOR POSITIVE (MULTI): Primary | ICD-10-CM

## 2024-01-05 DIAGNOSIS — Z17.0 MALIGNANT NEOPLASM OF LOWER-OUTER QUADRANT OF LEFT BREAST OF FEMALE, ESTROGEN RECEPTOR POSITIVE (MULTI): Primary | ICD-10-CM

## 2024-01-05 PROCEDURE — 1159F MED LIST DOCD IN RCRD: CPT | Performed by: SURGERY

## 2024-01-05 PROCEDURE — 3077F SYST BP >= 140 MM HG: CPT | Performed by: SURGERY

## 2024-01-05 PROCEDURE — 1036F TOBACCO NON-USER: CPT | Performed by: SURGERY

## 2024-01-05 PROCEDURE — 3080F DIAST BP >= 90 MM HG: CPT | Performed by: SURGERY

## 2024-01-05 PROCEDURE — 99024 POSTOP FOLLOW-UP VISIT: CPT | Performed by: SURGERY

## 2024-01-05 PROCEDURE — 1126F AMNT PAIN NOTED NONE PRSNT: CPT | Performed by: SURGERY

## 2024-01-05 PROCEDURE — 1160F RVW MEDS BY RX/DR IN RCRD: CPT | Performed by: SURGERY

## 2024-01-05 ASSESSMENT — PAIN SCALES - GENERAL: PAINLEVEL: 0-NO PAIN

## 2024-01-05 NOTE — PROGRESS NOTES
Chief Complaint  Post op  S/p left PM/SLNB 2023      History of Present Illness    Referring Provider: DORA mathew  PCP A Head    67 yo AA female here for post op  S/p left PM/SLNB 2023  Here w sister    History:  1) history of right breast excisional biopsy, benign  2) 2021. BL screening mammogram. Scattered FG tissue. Right breast negative. Left focal asymmetry BIRADS 0  3) 2022. Left MG/US. Left asymmetry persists.  Left US.  6:00N5, 5 x 3 x 5 mm mass BIRADS 3. Rec left MG/US 6 months  4) no FU until 2023. Right nipple dc  5) 2023. BL MG scattered FG tissue. Left breast mass with interval change and associated distortion.  BIRADS 0. Rec left US and right subareolar US  6) 10/16/2023. BL US.  Right subareolar US negative. Left 6:00N5, 0.8 x 0.6 x 0.8 cm irregular mass BIRADS 4. Left axillary US negative.   7) 2023. Left breast 6:00N5 biopsy, grade 1 IDC, ER/MO > 95% Her2 neg Open coil hydromark.   8) 2023 left PM/SLNB 1.1 cm grade 1 IDC, margins neg.  positive LN. 2.8 mm with KING pT1c pN1a margins neg  9) tumor board review oncotype DX, consider chemo, XRT, hormonal therapy       She presents today for  post op. No breast concerns.     Ob/gyn history:  Menarche 14  Menopause 55  , age of first delivery 28  5-10 yrs OCPs, no fertility treatments, no HRT    No family history of breast or ovarian cancer.     Review of systems  A comprehensive ROS was taken on the patient intake form and reviewed with the patient. This form is scanned into the electronic medical record.    Constitutional symptoms: Denies generalized fatigue. Denies weight change, fevers/chills, difficulty sleeping   Eyes: Denies double vision, glaucoma, cataracts.  Ear/nose/throat/mouth: Denies hearing changes, sore throat, sinus problems.  Cardiovascular: No chest pain. Denies irregular heartbeat. Denies ankle swelling.  Respiratory: No wheezing, cough, or shortness of breath.  Gastrointestinal: No  abdominal pain, No nausea/vomiting. No indigestion/heartburn. No change in bowel habits. No constipation or diarrhea.   Genitourinary: No urinary incontinence. No urinary frequency. No painful urination.  Musculoskeletal: No bone pain, no muscle pain, no joint pain.   Integumentary: No rash. No masses. No changes in moles. No easy bruising.  Neurological: No headaches. No tremors. No numbness/tingling.  Psychiatric: No anxiety. No depression.  Endocrine: No excessive thirst. Not too hot or too cold. Not tired or fatigued.   Hematological/lymphatic: No swollen glands or blood clotting problems. No bruising.     Physical exam  A chaperone was offered for all portions of the physical exam. The patient declined.     General appearance: appears stated age, alert and oriented x 3  Head: Normocephalic, atraumatic  Eyes: conjunctivae/corneas clear.  Ears: External ears are normal, hearing is grossly intact.  Lungs: normal breathing  Heart: regular   Abdomen: Soft, nontender, nondistended.  Neurologic: grossly normal  Lymph nodes: No cervical, supraclavicular or axillary lymphadenopathy bilaterally    Breast: A comprehensive breast exam was performed in the seated and supine positions. Breasts are symmetrical. Bilateral nipples are everted. There are no skin changes on arm maneuvers. Bra size: 42C   Right: no masses   Left: no masses. Healing incision. No erythema    Results  Imaging  All breast imaging personally reviewed by me.  See HPI    Pathology  11/1/2023. Left breast 6:00N5 biopsy, grade 1 IDC, ER/MA > 95% Her2 neg Open coil hydromark.     Impression:   1) 67 yo AA female here w new left breast cancer cT1 cN0 grade 1 IDC ER/MA positive Her2 neg. S/p left PM/SLNB pT1c pN1a margins neg.   2) Co-morbidities include asthma. Non-smoker  3) No family history of breast or ovarian cancer      Plan:   She is here with her sister.  She is recovering well from surgery.  She has no activity restrictions.  We reviewed her  pathology report she was given a copy.  The cancer was completely excised and negative margins.  Unfortunately 1 lymph node was positive with focal extranodal extension.  Her surgical treatment is complete.  We reviewed the role of adjuvant therapies.  Her case was reviewed at tumor board yesterday.  An Oncotype has been sent.  She will be referred to medical oncology and radiation oncology.  Bilateral mammogram is due in September.  She can follow-up in May for an exam.  She should call our office with any sooner concerns.

## 2024-01-08 ENCOUNTER — OFFICE VISIT (OUTPATIENT)
Dept: PRIMARY CARE | Facility: CLINIC | Age: 69
End: 2024-01-08
Payer: MEDICARE

## 2024-01-08 ENCOUNTER — LAB (OUTPATIENT)
Dept: LAB | Facility: LAB | Age: 69
End: 2024-01-08
Payer: MEDICARE

## 2024-01-08 VITALS
DIASTOLIC BLOOD PRESSURE: 72 MMHG | HEART RATE: 70 BPM | SYSTOLIC BLOOD PRESSURE: 102 MMHG | OXYGEN SATURATION: 95 % | WEIGHT: 219 LBS | BODY MASS INDEX: 38.79 KG/M2

## 2024-01-08 DIAGNOSIS — R25.2 MUSCLE CRAMPS: ICD-10-CM

## 2024-01-08 DIAGNOSIS — E55.9 VITAMIN D DEFICIENCY: ICD-10-CM

## 2024-01-08 DIAGNOSIS — R25.2 MUSCLE CRAMPS: Primary | ICD-10-CM

## 2024-01-08 PROBLEM — R21 RASH: Status: ACTIVE | Noted: 2024-01-08

## 2024-01-08 PROBLEM — G89.18 POSTOPERATIVE PAIN: Status: ACTIVE | Noted: 2024-01-08

## 2024-01-08 PROBLEM — Z97.3 WEARS EYEGLASSES: Status: ACTIVE | Noted: 2024-01-08

## 2024-01-08 PROBLEM — H10.10 ALLERGIC CONJUNCTIVITIS: Status: ACTIVE | Noted: 2024-01-08

## 2024-01-08 PROBLEM — Z86.2 HISTORY OF ANEMIA: Status: ACTIVE | Noted: 2024-01-08

## 2024-01-08 LAB
25(OH)D3 SERPL-MCNC: 39 NG/ML (ref 30–100)
MAGNESIUM SERPL-MCNC: 1.8 MG/DL (ref 1.6–2.4)
VIT B12 SERPL-MCNC: 988 PG/ML (ref 211–911)

## 2024-01-08 PROCEDURE — 1160F RVW MEDS BY RX/DR IN RCRD: CPT | Performed by: PHYSICIAN ASSISTANT

## 2024-01-08 PROCEDURE — 1036F TOBACCO NON-USER: CPT | Performed by: PHYSICIAN ASSISTANT

## 2024-01-08 PROCEDURE — 83735 ASSAY OF MAGNESIUM: CPT

## 2024-01-08 PROCEDURE — 1126F AMNT PAIN NOTED NONE PRSNT: CPT | Performed by: PHYSICIAN ASSISTANT

## 2024-01-08 PROCEDURE — 3074F SYST BP LT 130 MM HG: CPT | Performed by: PHYSICIAN ASSISTANT

## 2024-01-08 PROCEDURE — 82306 VITAMIN D 25 HYDROXY: CPT

## 2024-01-08 PROCEDURE — 1159F MED LIST DOCD IN RCRD: CPT | Performed by: PHYSICIAN ASSISTANT

## 2024-01-08 PROCEDURE — 99214 OFFICE O/P EST MOD 30 MIN: CPT | Performed by: PHYSICIAN ASSISTANT

## 2024-01-08 PROCEDURE — 3078F DIAST BP <80 MM HG: CPT | Performed by: PHYSICIAN ASSISTANT

## 2024-01-08 PROCEDURE — 82607 VITAMIN B-12: CPT

## 2024-01-08 PROCEDURE — 36415 COLL VENOUS BLD VENIPUNCTURE: CPT

## 2024-01-08 RX ORDER — BACLOFEN 10 MG/1
10 TABLET ORAL 3 TIMES DAILY PRN
Qty: 30 TABLET | Refills: 0 | Status: SHIPPED | OUTPATIENT
Start: 2024-01-08

## 2024-01-08 NOTE — ASSESSMENT & PLAN NOTE
Labs reviewed. Magnesium, vitamin B12, and vitamin D ordered. Eat a tablespoon of yellow mustard when actively occurring. Drink 6oz of tonic water before bedtime. Take baclofen 10mg PRN for cramps

## 2024-01-08 NOTE — PROGRESS NOTES
Subjective   Betty Mendieta is a 68 y.o. female who presents for Spasms. States for the past few weeks she has been experiencing intermittent muscle cramping in her hands, legs, and feet. Spasms have been occurring random, not just nocturnal. Denies any recent changes in physical activity level or diet. Potassium was normal in 12/2023. She does take calcium 1200mg daily. Drinks ~60oz water/day. States she recently re-started taking apple cider vinegar supplement. She previously took turmeric and VB12, but discontinued 6mo-1yr ago. States she was previously worked up for muscle cramps and was told she had a nutritional deficiency, but cannot recall which. She uses mustard PRN, tonic water, and baclofen muscle relaxer PRN.     Unrelated to cramping, she states she recently had her chin waxed and developed a skin reaction after. The reaction lasted 2 weeks and was incredibly itchy. She applied anti-itch cream and took benadryl. It has since resolved     12 point ROS reviewed and negative other than as stated in HPI    /72   Pulse 70   Wt 99.3 kg (219 lb)   SpO2 95%   BMI 38.79 kg/m²   Objective   Physical Exam  Vitals reviewed.   Constitutional:       General: She is not in acute distress.     Appearance: Normal appearance. She is not ill-appearing.   HENT:      Head: Normocephalic and atraumatic.   Eyes:      General: No scleral icterus.     Extraocular Movements: Extraocular movements intact.      Conjunctiva/sclera: Conjunctivae normal.      Pupils: Pupils are equal, round, and reactive to light.   Cardiovascular:      Rate and Rhythm: Normal rate and regular rhythm.      Heart sounds: Normal heart sounds. No murmur heard.     No friction rub. No gallop.   Pulmonary:      Effort: Pulmonary effort is normal. No respiratory distress.      Breath sounds: Normal breath sounds. No stridor. No wheezing, rhonchi or rales.   Musculoskeletal:      Cervical back: Normal range of motion.      Right lower leg: No  edema.      Left lower leg: No edema.   Skin:     General: Skin is warm and dry.   Neurological:      Mental Status: She is alert and oriented to person, place, and time. Mental status is at baseline.      Cranial Nerves: No cranial nerve deficit.      Gait: Gait normal.   Psychiatric:         Mood and Affect: Mood normal.         Behavior: Behavior normal.         Assessment/Plan   Problem List Items Addressed This Visit       Vitamin D deficiency     Vitamin D level ordered         Relevant Orders    Vitamin D 25-Hydroxy,Total (for eval of Vitamin D levels) (Completed)    Muscle cramps - Primary     Labs reviewed. Magnesium, vitamin B12, and vitamin D ordered. Eat a tablespoon of yellow mustard when actively occurring. Drink 6oz of tonic water before bedtime. Take baclofen 10mg PRN for cramps         Relevant Medications    baclofen (Lioresal) 10 mg tablet    Other Relevant Orders    Magnesium (Completed)    Vitamin B12 (Completed)        Follow up as scheduled or sooner as needed

## 2024-01-14 NOTE — RESULT ENCOUNTER NOTE
Vitamin B12, vitamin D, and magnesium levels are all in the normal range. No explanation here for muscle spasms/cramps. Try treatments as discussed in office

## 2024-01-15 ENCOUNTER — OFFICE VISIT (OUTPATIENT)
Dept: HEMATOLOGY/ONCOLOGY | Facility: HOSPITAL | Age: 69
End: 2024-01-15
Payer: MEDICARE

## 2024-01-15 VITALS
HEIGHT: 63 IN | RESPIRATION RATE: 18 BRPM | OXYGEN SATURATION: 96 % | TEMPERATURE: 97 F | HEART RATE: 73 BPM | WEIGHT: 218.92 LBS | BODY MASS INDEX: 38.79 KG/M2 | DIASTOLIC BLOOD PRESSURE: 77 MMHG | SYSTOLIC BLOOD PRESSURE: 117 MMHG

## 2024-01-15 DIAGNOSIS — M85.852 OSTEOPENIA OF NECKS OF BOTH FEMURS: Primary | ICD-10-CM

## 2024-01-15 DIAGNOSIS — Z17.0 MALIGNANT NEOPLASM OF LOWER-OUTER QUADRANT OF LEFT BREAST OF FEMALE, ESTROGEN RECEPTOR POSITIVE (MULTI): ICD-10-CM

## 2024-01-15 DIAGNOSIS — C50.512 MALIGNANT NEOPLASM OF LOWER-OUTER QUADRANT OF LEFT BREAST OF FEMALE, ESTROGEN RECEPTOR POSITIVE (MULTI): ICD-10-CM

## 2024-01-15 DIAGNOSIS — M85.851 OSTEOPENIA OF NECKS OF BOTH FEMURS: Primary | ICD-10-CM

## 2024-01-15 PROCEDURE — 1160F RVW MEDS BY RX/DR IN RCRD: CPT | Performed by: INTERNAL MEDICINE

## 2024-01-15 PROCEDURE — 99205 OFFICE O/P NEW HI 60 MIN: CPT | Performed by: INTERNAL MEDICINE

## 2024-01-15 PROCEDURE — 3078F DIAST BP <80 MM HG: CPT | Performed by: INTERNAL MEDICINE

## 2024-01-15 PROCEDURE — 1126F AMNT PAIN NOTED NONE PRSNT: CPT | Performed by: INTERNAL MEDICINE

## 2024-01-15 PROCEDURE — 1159F MED LIST DOCD IN RCRD: CPT | Performed by: INTERNAL MEDICINE

## 2024-01-15 PROCEDURE — 3074F SYST BP LT 130 MM HG: CPT | Performed by: INTERNAL MEDICINE

## 2024-01-15 PROCEDURE — 1036F TOBACCO NON-USER: CPT | Performed by: INTERNAL MEDICINE

## 2024-01-15 PROCEDURE — 99215 OFFICE O/P EST HI 40 MIN: CPT | Performed by: INTERNAL MEDICINE

## 2024-01-15 ASSESSMENT — ENCOUNTER SYMPTOMS
LOSS OF SENSATION IN FEET: 0
DEPRESSION: 0
OCCASIONAL FEELINGS OF UNSTEADINESS: 0

## 2024-01-15 ASSESSMENT — PAIN SCALES - GENERAL: PAINLEVEL: 0-NO PAIN

## 2024-01-16 PROBLEM — M85.852 OSTEOPENIA OF NECKS OF BOTH FEMURS: Status: ACTIVE | Noted: 2024-01-16

## 2024-01-16 PROBLEM — M85.851 OSTEOPENIA OF NECKS OF BOTH FEMURS: Status: ACTIVE | Noted: 2024-01-16

## 2024-01-16 ASSESSMENT — ENCOUNTER SYMPTOMS
SLEEP DISTURBANCE: 0
CONFUSION: 0
WHEEZING: 0
DYSURIA: 0
PALPITATIONS: 0
EXTREMITY WEAKNESS: 0
ABDOMINAL PAIN: 0
BLOOD IN STOOL: 0
BRUISES/BLEEDS EASILY: 0
RESPIRATORY NEGATIVE: 1
ABDOMINAL DISTENTION: 0
APPETITE CHANGE: 0
FATIGUE: 0
DIFFICULTY URINATING: 0
CHILLS: 0
SCLERAL ICTERUS: 0
HEMATURIA: 0
HEMOPTYSIS: 0
EYES NEGATIVE: 1
ARTHRALGIAS: 0
ADENOPATHY: 0
SHORTNESS OF BREATH: 0
CHEST TIGHTNESS: 0
NAUSEA: 0
COUGH: 0
CONSTITUTIONAL NEGATIVE: 1
HOT FLASHES: 0
CARDIOVASCULAR NEGATIVE: 1
NUMBNESS: 0
DIAPHORESIS: 0
SEIZURES: 0
FEVER: 0
MYALGIAS: 0
DIZZINESS: 0
FREQUENCY: 0
HEADACHES: 0
NERVOUS/ANXIOUS: 0
LEG SWELLING: 0
BACK PAIN: 0
DIARRHEA: 0
CONSTIPATION: 0
DEPRESSION: 0
EYE PROBLEMS: 0

## 2024-01-16 NOTE — PROGRESS NOTES
Breast Medical Oncology Clinic  Location: [facility]      BREAST CANCER DIAGNOSIS  Malignant neoplasm of lower-outer quadrant of left breast of female, estrogen receptor positive (CMS/HCC), Pathologic: Stage IA (pT1c, pN1a(sn), cM0, G1, ER+, KS+, HER2-)       HISTORY OF PRESENT ILLNESS    Betty Mendieta is a 68 y.o. woman s/p lumpectomy on 12/13/23 with left sided 1.1 IDC and 1/1 SLN, I.e. [id number] ER/KS>95% HER2 neg. Oncotype DX pending. Here for adjuvant therapy decision making.            Review of Systems   Constitutional: Negative.  Negative for appetite change, chills, diaphoresis, fatigue and fever.   HENT:  Negative.  Negative for hearing loss and lump/mass.    Eyes: Negative.  Negative for eye problems and icterus.   Respiratory: Negative.  Negative for chest tightness, cough, hemoptysis, shortness of breath and wheezing.    Cardiovascular: Negative.  Negative for chest pain, leg swelling and palpitations.   Gastrointestinal:  Negative for abdominal distention, abdominal pain, blood in stool, constipation, diarrhea and nausea.   Endocrine: Negative for hot flashes.   Genitourinary:  Negative for bladder incontinence, difficulty urinating, dyspareunia, dysuria, frequency and hematuria.    Musculoskeletal:  Negative for arthralgias, back pain, gait problem and myalgias.   Neurological:  Negative for dizziness, extremity weakness, gait problem, headaches, numbness and seizures.   Hematological:  Negative for adenopathy. Does not bruise/bleed easily.   Psychiatric/Behavioral:  Negative for confusion, depression and sleep disturbance. The patient is not nervous/anxious.    All other systems reviewed and are negative.        Past Medical History:  has a past medical history of Asthma, Breast cancer, left breast (CMS/HCC), Cataract, Diverticulosis, Essential (primary) hypertension (10/26/2018), GERD (gastroesophageal reflux disease), Ocular hypertension, unspecified eye (08/20/2018), Preglaucoma, unspecified,  "unspecified eye, Radiculopathy, lumbar region (2016), Unilateral primary osteoarthritis, left knee (2021), and Vitamin D deficiency.  Surgical History:   has a past surgical history that includes  section, classic; Tubal ligation; Breast biopsy (Right); Breast biopsy (Left, 2023); Total knee arthroplasty (Left); Total knee arthroplasty (Right); and BI US guided breast localization left (Left, 2023).  Social History:   reports that she has never smoked. She has never used smokeless tobacco. She reports that she does not drink alcohol and does not use drugs.  Family History:    Family History   Problem Relation Name Age of Onset    Hypertension Mother      Asthma Father      Hypertension Father      Glaucoma Sister      Hypertension Sister      Stroke Brother       Family Oncology History:  Cancer-related family history is not on file.      OBJECTIVE    VS / Pain:  /77   Pulse 73   Temp 36.1 °C (97 °F)   Resp 18   Ht 1.6 m (5' 2.99\")   Wt 99.3 kg (218 lb 14.7 oz)   SpO2 96%   BMI 38.79 kg/m²   BSA: 2.1 meters squared   Pain Scale: 0    Performance Status:  The ECOG performance scale today is ECO- Fully active, able to carry on all pre-disease performance w/o restriction.    Physical Exam  Constitutional:       General: She is not in acute distress.     Appearance: She is not ill-appearing, toxic-appearing or diaphoretic.   HENT:      Nose: No congestion or rhinorrhea.      Mouth/Throat:      Pharynx: No posterior oropharyngeal erythema.   Cardiovascular:      Rate and Rhythm: Normal rate and regular rhythm.      Pulses: Normal pulses.      Heart sounds: Normal heart sounds. No murmur heard.     No gallop.   Pulmonary:      Breath sounds: Normal breath sounds.   Abdominal:      General: There is no distension.      Palpations: There is no mass.      Tenderness: There is no abdominal tenderness.   Musculoskeletal:         General: No swelling.      Cervical back: No " rigidity.      Right lower leg: No edema.      Left lower leg: No edema.   Lymphadenopathy:      Cervical: No cervical adenopathy.   Skin:     General: Skin is warm.      Coloration: Skin is not cyanotic.      Findings: No bruising, ecchymosis or erythema.   Neurological:      General: No focal deficit present.      Mental Status: She is oriented to person, place, and time.      Cranial Nerves: Cranial nerves 2-12 are intact.      Motor: Motor function is intact.   Psychiatric:         Attention and Perception: Attention and perception normal.         Mood and Affect: Mood and affect normal.         Behavior: Behavior normal.         Thought Content: Thought content normal.         Judgment: Judgment normal.             Diagnostic Results        Study Result  Interpreted By: KALEB SIM MD  MRN: [id number]  Patient Name: CONNOR SMILEY    STUDY:  BONE DENSITY, DEXA 1 OR MORE SITES: AXIAL SKELETN9/25/2023 10:05 am    INDICATION:  screening. The patient is a 67 y/o year old F.    COMPARISON:  No comparison available.    ACCESSION NUMBER(S):  [id number]    ORDERING CLINICIAN:  RENZO BRYSON    TECHNIQUE:  BONE DENSITY, DEXA 1 OR MORE SITES: AXIAL SKELETN    FINDINGS:  SPINE L1-L4  Bone Mineral Density: 1.042  T-Score 0.0 Z-Score 1  Classification: Normal  Bone Mineral Density change vs baseline: Not reported  Bone Mineral Density change vs previous: Not reported    LEFT FEMUR -TOTAL  Bone Mineral Density: 0.807  T-Score -1.1 Z-Score -0.4  Classification: Osteopenia  Bone Mineral Density change vs baseline: Not reported  Bone Mineral Density change vs previous: Not reported    LEFT FEMUR -NECK  Bone Mineral Density: 0.648  T-Score -1.8 Z-Score -0.8  Classification: Osteopenia      World Health Organization (WHO) criteria for post-menopausal,   Women:  Normal: T-score at or above -1 SD  Osteopenia: T-score between -1 and -2.5 SD  Osteoporosis: T-score at or below -2.5 SD      10-year Fracture Risk:  Major  Osteoporotic Fracture 4.1  Hip Fracture 0.5    Note: If no FRAX score is reported, it is because:  Some T-score for Spine Total or Hip Total or Femoral Neck at or below  -2.5    This exam was performed at [facility] on a [facility] Dexa Unit.      IMPRESSION:  DEXA: According to World Health Organization criteria,  classification is  low bone mass (osteopenia)    Followup recommended in two years or sooner as clinically warranted.    All images and detailed analysis are available on the  Radiology  PACS.    MACRO:  None       BI US breast limited bilateral 10/16/2023    Narrative  Interpreted By:  Jay Linder,  STUDY:  BI US BREAST LIMITED BILATERAL;  10/16/2023 11:12 am    ACCESSION NUMBER(S):  [id number]    ORDERING CLINICIAN:  RENZO BRYSON    INDICATION:  Callback from screening. Nipple discharge.    COMPARISON:  Mammogram dated 09/26/2023    FINDINGS:  The patient was recalled for a mass in the left breast. Since her  screening mammogram, she has developed right nipple discharge.    Sonographic images were obtained of the right breast in the  subareolar region to evaluate the nipple discharge. No suspicious  sonographic abnormality is identified. There is a dilated duct which  demonstrates no internal echoes or intraductal masses. Color Doppler  imaging demonstrates normal vascularity. Elastography shows soft  tissue.    The left breast was evaluated. At the 6 o'clock position 5 cm from  the nipple the correlate for the mammographic mass is identified. It  is a irregular ill-defined hypoechoic mass with internal vascularity  which demonstrates areas of increased stiffness on elastography. The  mass measures 0.8 x 0.6 x 0.8 cm.    The left axilla demonstrates only normal appearing lymph nodes.    Impression  Mass for which the patient was recalled demonstrates both suspicious  features mammographically and sonographically. An ultrasound-guided  biopsy is recommended.    No suspicious  findings seen to explain the patient's nipple  discharge. Clinical follow-up is recommended.    The findings and recommendations were discussed with the patient the  time of interpretation. She will be scheduled for surgical  consultation and biopsy. A notification was sent to the ordering  provider.    Critical Finding:  See findings. Notification was initiated on  10/16/2023 at 11:47 am by  Jay Linder.  (**-YCF-**) Instructions:  See Impression for specific recommendations.    BI-RADS CATEGORY:    BI-RADS Category:  4 Suspicious.  Recommendation:  Biopsy.  Recommended Date:  Immediate.  Laterality:  Left    For any future breast imaging appointments, please call [phone] ([phone]).      MACRO:  None      Signed by: Jay Linder 10/16/2023 11:47 AM  Dictation workstation:   [id number]     No images are attached to the encounter.    LABORATORY/PATHOLOGY DATA    Lab on 01/08/2024   Component Date Value Ref Range Status    Magnesium 01/08/2024 1.80  1.60 - 2.40 mg/dL Final    Vitamin B12 01/08/2024 988 (H)  211 - 911 pg/mL Final    Vitamin D, 25-Hydroxy, Total 01/08/2024 39  30 - 100 ng/mL Final   Admission on 12/13/2023, Discharged on 12/13/2023   Component Date Value Ref Range Status    Case Report 12/13/2023    Final                    Value:Surgical Pathology                                Case: [id number]                                  Authorizing Provider:  Ruby Luis MD            Collected:           12/13/2023 1105              Ordering Location:     [facility] OR Received:            12/13/2023 1431              Pathologist:           Jim Landa MD                                                            Specimens:   A) - BREAST LUMPECTOMY LEFT, LEFT BREAST MAGSEED LOCALIZED PARTIAL MASTECTOMY                       B) - BREAST MARGIN LEFT, LEFT BREAST NEW ANTERIOR MARGIN INK MARKED NEW MARGIN                      PAINTED BLACK                                                                                        C) - BREAST MARGIN LEFT, LEFT BREAST NEW INFERIOR MARGIN INK MARKED NEW MARGIN                      PAINTED BLACK                                                                                       D) - BREAST MARGIN LEFT, LEFT BREAST NEW LATERAL MARGIN INK MARKED NEW MARGIN PAINTED                                         BLACK                                                                                               E) - BREAST MARGIN LEFT, LEFT BREAST NEW MEDIAL MARGIN INK MARKED NEW MARGIN PAINTED                BLACK                                                                                               F) - BREAST MARGIN LEFT, LEFT BREAST NEW POSTERIOR MARGIN INK MARKED NEW MARGIN                     PAINTED BLACK                                                                                       G) - BREAST MARGIN LEFT, LEFT BREAST NEW SUPERIOR MARGIN INK MARKED NEW MARGIN                      PAINTED BLACK                                                                                       H) - SENTINEL LYMPH NODE BREAST LEFT, LEFT AXILLARY SENTINEL LYMPH NODES                   FINAL DIAGNOSIS 12/13/2023    Final                    Value:This result contains rich text formatting which cannot be displayed here.      12/13/2023    Final                    Value:This result contains rich text formatting which cannot be displayed here.    Comment 12/13/2023    Final                    Value:This result contains rich text formatting which cannot be displayed here.    Case Summary Report 12/13/2023    Final                    Value:INVASIVE CARCINOMA OF THE BREAST: Resection                            INVASIVE CARCINOMA OF THE BREAST: RESECTION - A                            8th Edition - Protocol posted: 6/21/2023                                                        SPECIMEN                               Procedure:    Excision (less than total mastectomy)                                 Specimen Laterality:    Left                                                         TUMOR                               Histologic Type:    Invasive carcinoma of no special type (ductal)                                Histologic Grade ([place] Histologic Score):                                     Glandular (Acinar) / Tubular Differentiation:    Score 2                                  Nuclear Pleomorphism:    Score 2                                  Mitotic Rate:    Score 1                                  Overall Grade:    Grade 1 (scores of 3, 4 or 5)                                Tumor Size:    Greatest dimension of largest invasive focus (Millimeters): 11 mm                               Tumor Focality:    Single focus of invasive carcinoma                                Ductal Carcinoma In Situ (DCIS):    Not identified                                Lobular Carcinoma In Situ (LCIS):    Not identified                                Lymphatic and / or Vascular Invasion:    Cannot be determined                                Microcalcifications:    Not identified                                Treatment Effect in the Breast:    No known presurgical therapy                                                         MARGINS                               Margin Status for Invasive Carcinoma:    All margins negative for invasive carcinoma                                  Distance from Invasive Carcinoma to Closest Margin:    Greater than: 2 mm                                                        REGIONAL LYMPH NODES                               Regional Lymph Node Status:                                     :    Tumor present in regional lymph node(s)                                    Number of Lymph Nodes with Macrometastases:    1                                    Number of Lymph Nodes with Micrometastases:    0                                    Number of Lymph Nodes with Isolated Tumor  Cells:    0                                    Size of Largest [place] Metastatic Deposit:    2.8 mm                                   Extranodal Extension:    Present, 2 mm or less                                  Total Number of Lymph Nodes Examined (sentinel and non-sentinel):    1                                  Number of [place] Nodes Examined:    1                                                         pTNM CLASSIFICATION (AJCC 8th Edition)                               Reporting of pT, pN, and (when applicable) pM categories is based on information available to the pathologist at the time the report is issued. As per the AJCC (Chapter 1, 8th Ed.) it is the managing physician’s responsibility to establish the final pathologic stage based upon all pertinent information, including but potentially not limited to this pathology report.                               pT Category:    pT1c                                pN Category:    pN1a                                N Suffix:    (sn)                                                         SPECIAL STUDIES                               Estrogen Receptor (ER) Status:    Positive (greater than 10% of cells demonstrate nuclear positivity)                                  Percentage of Cells with Nuclear Positivity:    >95 %                               Progesterone Receptor (PgR) Status:    Positive                                  Percentage of Cells with Nuclear Positivity:    >95 %                               HER2 (by immunohistochemistry):    Negative (Score 1+)                                Testing Performed on Case Number:    Prior biopsy: [id number]                                                          Comment(s):    Tumor block: A6       Clinical History 12/13/2023    Final                    Value:This result contains rich text formatting which cannot be displayed here.    Gross Description 12/13/2023    Final                    Value:This result  contains rich text formatting which cannot be displayed here.   Lab on 12/12/2023   Component Date Value Ref Range Status    Glucose 12/12/2023 79  74 - 99 mg/dL Final    Sodium 12/12/2023 141  136 - 145 mmol/L Final    Potassium 12/12/2023 4.2  3.5 - 5.3 mmol/L Final    Chloride 12/12/2023 104  98 - 107 mmol/L Final    Bicarbonate 12/12/2023 29  21 - 32 mmol/L Final    Anion Gap 12/12/2023 12  10 - 20 mmol/L Final    Urea Nitrogen 12/12/2023 10  6 - 23 mg/dL Final    Creatinine 12/12/2023 0.76  0.50 - 1.05 mg/dL Final    eGFR 12/12/2023 87  >60 mL/min/1.73m*2 Final    Calcium 12/12/2023 9.9  8.6 - 10.3 mg/dL Final    WBC 12/12/2023 5.9  4.4 - 11.3 x10*3/uL Final    nRBC 12/12/2023 0.0  0.0 - 0.0 /100 WBCs Final    RBC 12/12/2023 4.33  4.00 - 5.20 x10*6/uL Final    Hemoglobin 12/12/2023 12.3  12.0 - 16.0 g/dL Final    Hematocrit 12/12/2023 39.6  36.0 - 46.0 % Final    MCV 12/12/2023 92  80 - 100 fL Final    MCH 12/12/2023 28.4  26.0 - 34.0 pg Final    MCHC 12/12/2023 31.1 (L)  32.0 - 36.0 g/dL Final    RDW 12/12/2023 12.9  11.5 - 14.5 % Final    Platelets 12/12/2023 487 (H)  150 - 450 x10*3/uL Final    Neutrophils % 12/12/2023 68.4  40.0 - 80.0 % Final    Immature Granulocytes %, Automated 12/12/2023 0.3  0.0 - 0.9 % Final    Lymphocytes % 12/12/2023 17.1  13.0 - 44.0 % Final    Monocytes % 12/12/2023 8.8  2.0 - 10.0 % Final    Eosinophils % 12/12/2023 3.7  0.0 - 6.0 % Final    Basophils % 12/12/2023 1.7  0.0 - 2.0 % Final    Neutrophils Absolute 12/12/2023 4.02  1.20 - 7.70 x10*3/uL Final    Immature Granulocytes Absolute, Au* 12/12/2023 0.02  0.00 - 0.70 x10*3/uL Final    Lymphocytes Absolute 12/12/2023 1.01 (L)  1.20 - 4.80 x10*3/uL Final    Monocytes Absolute 12/12/2023 0.52  0.10 - 1.00 x10*3/uL Final    Eosinophils Absolute 12/12/2023 0.22  0.00 - 0.70 x10*3/uL Final    Basophils Absolute 12/12/2023 0.10  0.00 - 0.10 x10*3/uL Final   Pre-Admission Testing on 12/12/2023   Component Date Value Ref Range Status     Ventricular Rate 12/12/2023 70  BPM Final    Atrial Rate 12/12/2023 70  BPM Final    KS Interval 12/12/2023 164  ms Final    QRS Duration 12/12/2023 86  ms Final    QT Interval 12/12/2023 388  ms Final    QTC Calculation(Bazett) 12/12/2023 419  ms Final    P Axis 12/12/2023 55  degrees Final    R Axis 12/12/2023 9  degrees Final    T Axis 12/12/2023 8  degrees Final    QRS Count 12/12/2023 12  beats Final    Q Onset 12/12/2023 223  ms Final    P Onset 12/12/2023 141  ms Final    P Offset 12/12/2023 206  ms Final    T Offset 12/12/2023 417  ms Final    QTC Fredericia 12/12/2023 408  ms Final          IMPRESSION/PLAN    Left sided pT1cN1 ER/KS+ & HER2 neg low clinical risk breast cancer. Await oncotype DX but unlikely will need chemo. Scheduled to see radiation oncology soon. Recommend 5-7 yrs of anastrozole depending on tolerability after radiation. We discussed that depending on oncotype she may be eligible for a radiation trial. Will see her back post XRT. Info on anastrozole given to her.      Moderate osteopenia w/jf T of -1.8 (left femur neck).  Would recommend adjuvant bisphosphonates given she will need 5-7 yrs of anastrozole.      We discussed the clinical significance of diagnosis, goals of care and treatment plan in detail.     I personally spent over half of a total 25 minutes face to face with the patient in counseling and discussion and/or coordination of care as described above.         -------------------------------------------------------------------------------------------------------------------------------  Rafal Ball MD  Director of Breast Cancer Medical Oncology Research Program   [facility]  Professor of Medicine  [address], [address]  [place] [id number]  Phone: [phone]  [email or web address]

## 2024-01-18 ENCOUNTER — TELEPHONE (OUTPATIENT)
Dept: ADMISSION | Facility: HOSPITAL | Age: 69
End: 2024-01-18
Payer: MEDICARE

## 2024-01-18 NOTE — TELEPHONE ENCOUNTER
Pt called- she said she was seen in the office earlier this week and Dr. Ball was waiting on one more test result to come back (she wasn't sure what it was). She wanted to follow up. Oncotype is still pending. Message sent to the team to see if that's what we are waiting on.

## 2024-01-18 NOTE — TELEPHONE ENCOUNTER
Pt notified it is the Oncotype that Dr. Ball is waiting on. He will let her know when it comes back.

## 2024-01-19 ENCOUNTER — TELEPHONE (OUTPATIENT)
Dept: RADIATION ONCOLOGY | Facility: HOSPITAL | Age: 69
End: 2024-01-19
Payer: MEDICARE

## 2024-01-19 NOTE — TELEPHONE ENCOUNTER
Called pt to remind of appointment on 1/22/24 at 1:30 Pt confirmed appointment.     Luis Antonio Centeno MA

## 2024-01-22 ENCOUNTER — HOSPITAL ENCOUNTER (OUTPATIENT)
Dept: RADIATION ONCOLOGY | Facility: HOSPITAL | Age: 69
Setting detail: RADIATION/ONCOLOGY SERIES
Discharge: HOME | End: 2024-01-22
Payer: MEDICARE

## 2024-01-22 VITALS
HEART RATE: 88 BPM | TEMPERATURE: 97.9 F | WEIGHT: 225 LBS | DIASTOLIC BLOOD PRESSURE: 76 MMHG | OXYGEN SATURATION: 96 % | BODY MASS INDEX: 41.41 KG/M2 | RESPIRATION RATE: 18 BRPM | SYSTOLIC BLOOD PRESSURE: 123 MMHG | HEIGHT: 62 IN

## 2024-01-22 DIAGNOSIS — C50.512 MALIGNANT NEOPLASM OF LOWER-OUTER QUADRANT OF LEFT BREAST OF FEMALE, ESTROGEN RECEPTOR POSITIVE (MULTI): ICD-10-CM

## 2024-01-22 DIAGNOSIS — J45.20 MILD INTERMITTENT ASTHMA, UNSPECIFIED WHETHER COMPLICATED (HHS-HCC): ICD-10-CM

## 2024-01-22 DIAGNOSIS — Z17.0 MALIGNANT NEOPLASM OF LOWER-OUTER QUADRANT OF LEFT BREAST OF FEMALE, ESTROGEN RECEPTOR POSITIVE (MULTI): ICD-10-CM

## 2024-01-22 DIAGNOSIS — Z96.652 STATUS POST LEFT KNEE REPLACEMENT: Primary | ICD-10-CM

## 2024-01-22 DIAGNOSIS — K57.31 DIVERTICULOSIS OF LARGE INTESTINE WITH HEMORRHAGE: ICD-10-CM

## 2024-01-22 PROCEDURE — 77263 THER RADIOLOGY TX PLNG CPLX: CPT | Performed by: RADIOLOGY

## 2024-01-22 PROCEDURE — 99205 OFFICE O/P NEW HI 60 MIN: CPT | Performed by: RADIOLOGY

## 2024-01-22 PROCEDURE — 99215 OFFICE O/P EST HI 40 MIN: CPT | Performed by: RADIOLOGY

## 2024-01-22 ASSESSMENT — PATIENT HEALTH QUESTIONNAIRE - PHQ9
SUM OF ALL RESPONSES TO PHQ9 QUESTIONS 1 AND 2: 0
2. FEELING DOWN, DEPRESSED OR HOPELESS: NOT AT ALL
1. LITTLE INTEREST OR PLEASURE IN DOING THINGS: NOT AT ALL

## 2024-01-22 ASSESSMENT — COLUMBIA-SUICIDE SEVERITY RATING SCALE - C-SSRS
6. HAVE YOU EVER DONE ANYTHING, STARTED TO DO ANYTHING, OR PREPARED TO DO ANYTHING TO END YOUR LIFE?: NO
1. IN THE PAST MONTH, HAVE YOU WISHED YOU WERE DEAD OR WISHED YOU COULD GO TO SLEEP AND NOT WAKE UP?: NO
2. HAVE YOU ACTUALLY HAD ANY THOUGHTS OF KILLING YOURSELF?: NO

## 2024-01-22 ASSESSMENT — ENCOUNTER SYMPTOMS
OCCASIONAL FEELINGS OF UNSTEADINESS: 0
DEPRESSION: 0
LOSS OF SENSATION IN FEET: 0

## 2024-01-22 NOTE — LETTER
2024     Ruby Luis MD  [address] [address] [facility]  Breast [facility] [address]    Patient: Betty Mendieta   YOB: 1955   Date of Visit: 2024       Dear Dr. Ruby Luis MD:    Thank you for referring Betty Mendieta to me for evaluation. Below are my notes for this consultation.  If you have questions, please do not hesitate to call me. I look forward to following your patient along with you.       Sincerely,     Yaya Taylor MD PhD      CC: Rafal Ball MD  ______________________________________________________________________________________    Radiation Oncology Outpatient Consult    Patient Name:  Betty Mendieta  MRN:  [id number]  :  1955    Referring Provider: Ruby Luis MD  Primary Care Provider: Dariana Laird PA-C  Care Team: Patient Care Team:  Dariana Laird PA-C as PCP - General (Internal Medicine)  Dariana Laird PA-C as PCP - [employer] PCP  Rafal Ball MD as Consulting Physician (Hematology and Oncology)  Yaya Taylor MD PhD as Radiation Oncologist (Radiation Oncology)    Date of Service: 2024     SUBJECTIVE  History of Present Illness:  Betty Mendieta is a 68 y.o. female who was referred by Ruby Luis MD, for a consultation to the [facility] Department of Radiation Oncology.  She is presenting for evaluation and management of her LEFT sided node positive breast cancer    Her brief oncologic history is as follows:    Patient has a history of benign breast biopsy on the right.    2021: Bilateral screening mammogram showed scattered fibroglandular tissue with a left focal asymmetry, BI-RADS 0.    2022: Mammogram and ultrasound of the left breast showed the asymmetry persisted.  It was 6 cm from the nipple and measured 5 x 3 x 5 mm BI-RADS Category 3.  Recommendation was for repeat mammogram and ultrasound in 6 months.    2023: Patient presented with  right nipple discharge and a bilateral mammogram showed scattered fibroglandular tissue with a left breast mass showing an interval change and associated distortion BI-RADS Category 0.  An ultrasound was recommended.    October 60 2023: Ultrasound of the right subareolar area showed no suspicious abnormality.  Ultrasound of the left mass at the 6 o'clock position 5 cm from the nipple showed a 0.8 x 0.6 x 0.8 cm irregular mass with no suspicious left axillary lymph nodes.    November 1, 2023: Biopsy of the left breast mass showed grade 1 invasive ductal carcinoma, ER/MS 95% positive, HER2/thomas negative and a biopsy marker was placed at the time of the biopsy.    December 13, 2023: Patient underwent a left lumpectomy and sentinel lymph node biopsy with Dr. Jocelin Luis, final pathology showed a 1.1 cm grade 1 invasive ductal carcinoma, margins negative, 1 out of 1 lymph nodes positive with 2.8 mm of disease with microscopic KING, pT1 cN1a.  Oncotype Dx testing is pending.    Patient's case was discussed at multidisciplinary tumor board with recommendations for Oncotype testing to consider chemotherapy and refer to radiation oncology as the patient may be eligible for [facility] MA.39 trial    January 16, 2024: Patient was seen by Dr. Ball in medical oncology to discuss the role of systemic therapy.  Oncotype DX testing is pending.    Patient presents today to discuss radiation treatment.  She reports she is feeling generally well with no significant issues since her surgery.  She has healed well without evidence of infection, fever, new masses, pain, vision changes, headache, bone pain, shortness of breath, or other complaints.    Prior Radiotherapy:  No  Current Systemic Treatment:  No  Presence of Pacemaker or ICD:  No    Past Medical History:    Past Medical History:   Diagnosis Date   • Asthma    • Breast cancer, left breast (CMS/HCC)    • Cataract    • Diverticulosis    • Essential (primary) hypertension 10/26/2018     Benign essential hypertension   • GERD (gastroesophageal reflux disease)    • Ocular hypertension, unspecified eye 2018    Ocular hypertension, unspecified laterality   • Preglaucoma, unspecified, unspecified eye     Glaucoma suspect   • Radiculopathy, lumbar region 2016    Lumbar radiculopathy   • Unilateral primary osteoarthritis, left knee 2021    Primary localized osteoarthrosis of left lower leg   • Vitamin D deficiency      Past Surgical History:    Past Surgical History:   Procedure Laterality Date   • BI US GUIDED BREAST LOCALIZATION LEFT Left 2023    BI US GUIDED BREAST LOCALIZATION LEFT 2023 Donna Ramirez MD [facility]   • BREAST BIOPSY Right     benign   • BREAST BIOPSY Left 2023   •  SECTION, CLASSIC      x 2   • TOTAL KNEE ARTHROPLASTY Left    • TOTAL KNEE ARTHROPLASTY Right    • TUBAL LIGATION      Tubal Ligation        Family History: No family history of breast or ovarian cancer.    Past gynecologic history: Menarche at age 14, menopause at age 55,  with her first child delivered at age 28.  She has 5 to 10-year history of oral contraceptive therapy use, no hormone replacement therapy use.    Social History:    Social History     Tobacco Use   • Smoking status: Never   • Smokeless tobacco: Never   Substance Use Topics   • Alcohol use: Never   • Drug use: Never     Allergies:    Allergies   Allergen Reactions   • Budesonide-Formoterol Shortness of breath, Other and Wheezing     Other reaction: asthma   • Nickel Itching     Rash discoloration with cheap jewelry   • Nut - Unspecified Swelling     BRAZIL NUTS ONLY      Medications:    Current Outpatient Medications:   •  albuterol 90 mcg/actuation inhaler, Inhale 2 puffs 4 times a day. (Patient taking differently: Inhale 2 puffs every 4 hours if needed.), Disp: 18 g, Rfl: 3  •  amLODIPine (Norvasc) 5 mg tablet, Take 1 tablet (5 mg) by mouth once every day. For blood pressure, Disp: 90 tablet, Rfl: 1  •   apple cider vinegar 500 mg tablet, Take 1 tablet by mouth once daily., Disp: , Rfl:   •  baclofen (Lioresal) 10 mg tablet, Take 1 tablet (10 mg) by mouth 3 times a day as needed for muscle spasms., Disp: 30 tablet, Rfl: 0  •  calcium carbonate-vitamin D3 (Calcium 600 with Vitamin D3) 600 mg-10 mcg (400 unit) chewable tablet, Chew 1 tablet once daily., Disp: , Rfl:   •  fluticasone-umeclidin-vilanter (Trelegy Ellipta) 200-62.5-25 mcg blister with device, Inhale 1 puff once daily in the morning. Take before meals., Disp: 60 each, Rfl: 5  •  ibuprofen 800 mg tablet, Take 1 tablet (800 mg) by mouth 2 times a day as needed., Disp: , Rfl:   •  ipratropium-albuteroL (Duo-Neb) 0.5-2.5 mg/3 mL nebulizer solution, Take 3 mL by nebulization 2 times a day., Disp: 180 mL, Rfl: 5  •  latanoprost (Xalatan) 0.005 % ophthalmic solution, Administer 1 drop into both eyes once daily at bedtime., Disp: 2.5 mL, Rfl: 3  •  losartan (Cozaar) 100 mg tablet, Take 1 tablet (100 mg) by mouth once daily., Disp: 90 tablet, Rfl: 1  •  metoprolol tartrate (Lopressor) 50 mg tablet, Take 1 tablet by mouth 2 times a day., Disp: 180 tablet, Rfl: 1  •  montelukast (Singulair) 10 mg tablet, Take 1 tablet (10 mg) by mouth once daily at bedtime., Disp: 90 tablet, Rfl: 1  •  multivitamin tablet, Take 1 tablet by mouth once every day., Disp: , Rfl:   •  vit C/vit E ac/selenium/ginkgo (MEMORY COMPLEX ORAL), Take by mouth., Disp: , Rfl:       Review of Systems:  Review of Systems - Oncology  The patient's current pain level was assessed.  They report currently having a pain of 0 out of 10.  They feel their pain is under control without the use of pain medications.  14 point review of systems reviewed at length patient is positive as per note in the HPI.    Performance Status:  The Karnofsky performance scale today is 100, Fully active, able to carry on all pre-disease performed without restriction (ECOG equivalent 0).      OBJECTIVE  Physical Exam:  There  were no vitals taken for this visit.   General: no acute distress, engaged in conversation.   HEENT: Normocephalic, atraumatic. Extraocular movements are intact.   Neck: supple with trachea at midline, no palpable adenopathy.   Pulmonary: Breathing comfortably on room air, no respiratory distress  Cardiovascular: Regular rate, no cyanosis, well-perfused  Abdomen: Soft, nontender, nondistended.   Extremities: No lower extremity edema or cyanosis. Normal range of motion.  Skin: Without rash or obvious lesions.   Musculoskeletal: Normal range of motion. Able to raise both arms above head without issues  Neurologic: Alert and oriented x3. Cranial nerves grossly intact.   Breast: left breast with well healed incision, no erythema, masses, or discharge noted. No adenopathy noted    Laboratory Review:  There are no laboratory contraindications to radiation therapy.  The pertinent lab results were reviewed and discussed with the patient.      Pathology Review:  The pertinent pathology results were reviewed and discussed with the patient.    SPECIMEN   Procedure  Excision (less than total mastectomy)   Specimen Laterality  Left   TUMOR   Histologic Type  Invasive carcinoma of no special type (ductal)   Histologic Grade (Santo Histologic Score)     Glandular (Acinar) / Tubular Differentiation  Score 2   Nuclear Pleomorphism  Score 2   Mitotic Rate  Score 1   Overall Grade  Grade 1 (scores of 3, 4 or 5)   Tumor Size  Greatest dimension of largest invasive focus (Millimeters): 11 mm   Tumor Focality  Single focus of invasive carcinoma   Ductal Carcinoma In Situ (DCIS)  Not identified   Lobular Carcinoma In Situ (LCIS)  Not identified   Lymphatic and / or Vascular Invasion  Cannot be determined   Microcalcifications  Not identified   Treatment Effect in the Breast  No known presurgical therapy   MARGINS   Margin Status for Invasive Carcinoma  All margins negative for invasive carcinoma   Distance from Invasive Carcinoma to  Closest Margin  Greater than: 2 mm   REGIONAL LYMPH NODES   Regional Lymph Node Status  Tumor present in regional lymph node(s)   Number of Lymph Nodes with Macrometastases  1   Number of Lymph Nodes with Micrometastases  0   Number of Lymph Nodes with Isolated Tumor Cells  0   Size of Largest Gaston Metastatic Deposit  2.8 mm   Extranodal Extension  Present, 2 mm or less   Total Number of Lymph Nodes Examined (sentinel and non-sentinel)  1   Number of Murdock Nodes Examined  1   pTNM CLASSIFICATION (AJCC 8th Edition)   Reporting of pT, pN, and (when applicable) pM categories is based on information available to the pathologist at the time the report is issued. As per the AJCC (Chapter 1, 8th Ed.) it is the managing physician’s responsibility to establish the final pathologic stage based upon all pertinent information, including but potentially not limited to this pathology report.   pT Category  pT1c   pN Category  pN1a   N Suffix  (sn)   SPECIAL STUDIES        Estrogen Receptor (ER) Status  Positive (greater than 10% of cells demonstrate nuclear positivity)   Percentage of Cells with Nuclear Positivity  >95 %        Progesterone Receptor (PgR) Status  Positive   Percentage of Cells with Nuclear Positivity  >95 %        HER2 (by immunohistochemistry)  Negative (Score 1+)     Oncotype Dx score pending    Imaging:  The pertinent imaging results were reviewed and discussed with the patient.  These are as noted in the HPI       ASSESSMENT:  Betty Mendieta is a 68 y.o. female with Malignant neoplasm of lower-outer quadrant of left breast of female, estrogen receptor positive (CMS/HCC), Pathologic: Stage IA (pT1c, pN1a(sn), cM0, G1, ER+, MD+, HER2-).  Status postlumpectomy and sentinel lymph node biopsy with final pathology showing 1.1 cm grade 1 invasive ductal carcinoma, ER/MD positive, HER2/thomas negative, negative surgical margins, 1 out of 1 lymph node involved with 2.8 mm of disease, microscopic KING, Oncotype DX score  pending.  Potential MA.39 trial candidate.    The patient is doing well > 4 weeks out from her lumpectomy and SLNB, with well-healing incision, no significant seroma or lumpectomy retraction, and no other suspicious findings in the breast.     We discussed with Ms. Mendieta that lumpectomy followed by whole breast radiation is the standard of care for breast conservation candidates. Post-lumpectomy radiation reduces the risk of locoregional recurrences by 2/3, reduces the risk of overall recurrence by 1/2, and afford a modest survival benefit according to the EBCTCG meta-analysis. Even among ER+ patients treated with tamoxifen, WBRT still decreases the risk of ipsilateral breast tumor (from ~ 16.5% with tamoxifen alone to 9% with radiation alone to 2.8% with tamoxifen + radiation according to NSABP B-21).     We also discussed our recommendation for regional kimberlee irradiation off trial given her 1/1 LN+ with KING. There are at least two trials investigating the addition of regional kimberlee irradiation in the setting of high-risk disease, MA.20 and EORTC 56298. Both of these trials included patients with node-positive disease or with high-risk node-negative disease. Both of these trials showed an improvement in disease-free survival with the addition of regional kimberlee irradiation, although this did not impact overall survival. In the MA.20 trial, this also improved local recurrence and distant metastases. Patients with hormone-positive disease who received hormonal therapy and chemotherapy appeared to be more likely to benefit from regional kimberlee irradiation in the EORTC 07823 trial. These trials included radiation to the level 3/undissected axilla, supraclavicular nodes, and internal mammary nodes. We discussed that approximately 14% of the patients of the START trials received kimberlee irradiation, and that there is an older [employer] [place] trial where patients received hypofractionated kimberlee irradiation. Finally, we  discussed the recently presented study of Radiation Fractionation on Patient Outcomes After Breast REConstruction (FABREC) trial. This 400 patient randomized trial compared standard 6-7 week radiation with a shorter 3-4 week course. Both the accelerated and standard courses of treatment were equally effective at preventing the cancer from returning and had the same level of side effects. The patients in the shorter course treatment arm experienced fewer treatment disruptions and a lower financial burden as well. Thus, we feel comfortable delivering regional kimberlee irradiation in a hypofractionated fashion.    We did discuss the currently enrolling a MA.39 trial which is exploring regional kimberlee radiation omission in women with a low Oncotype score.  Pending her Oncotype results she may be eligible for this trial and she will consider enrollment.    Additionally, we discussed that long term follow up of the trials discussed above (and others) suggest that patients may even have decreased acute toxicity and improved cosmesis with hypofractionation. After a thorough discussion of the above, the patient elected for hypofractionation.     Finally, we discussed the side effects and risk of WBRT and regional kimberlee treatment, including the need for CT simulation, tattoo placement, and daily radiation (M-F) x 20 days. We discussed the side effects and risks of radiation, including fatigue, radiation dermatitis, and breast pain in the short-term; skin fibrosis, pigmentation, chest wall pain, damage to underlying lung, rib, or heart, and secondary malignancies in the long-term. All questions were answered to the best of our ability and informed consent was obtained.      PLAN:    - discussed [facility] MA.39 trial, patient will consider if eligible  - off trial would plan left breast and RNI treatment to 16 fractions to 42.56 GY no boost, will consider ABC for [facility]    NCCN Guidelines were applicable to guide this patients treatment  plan.     Yaya Taylor MD, PhD  Attending Physician    Over 60 minutes was spent in evaluating, counseling, and examining the patient. More than 50% of that time was spent in face to face discussion.

## 2024-01-22 NOTE — PROGRESS NOTES
Radiation Oncology Outpatient Consult    Patient Name:  Betty Mendieta  MRN:  [id number]  :  1955    Referring Provider: Ruby Luis MD  Primary Care Provider: Dariana Laird PA-C  Care Team: Patient Care Team:  Dariana Laird PA-C as PCP - General (Internal Medicine)  Dariana Laird PA-C as PCP - [employer] PCP  Rafal Ball MD as Consulting Physician (Hematology and Oncology)  Yaya Taylor MD PhD as Radiation Oncologist (Radiation Oncology)    Date of Service: 2024     SUBJECTIVE  History of Present Illness:  Betty Mendieta is a 68 y.o. female who was referred by Ruby Luis MD, for a consultation to the [facility] Department of Radiation Oncology.  She is presenting for evaluation and management of her LEFT sided node positive breast cancer    Her brief oncologic history is as follows:    Patient has a history of benign breast biopsy on the right.    2021: Bilateral screening mammogram showed scattered fibroglandular tissue with a left focal asymmetry, BI-RADS 0.    2022: Mammogram and ultrasound of the left breast showed the asymmetry persisted.  It was 6 cm from the nipple and measured 5 x 3 x 5 mm BI-RADS Category 3.  Recommendation was for repeat mammogram and ultrasound in 6 months.    2023: Patient presented with right nipple discharge and a bilateral mammogram showed scattered fibroglandular tissue with a left breast mass showing an interval change and associated distortion BI-RADS Category 0.  An ultrasound was recommended.    : Ultrasound of the right subareolar area showed no suspicious abnormality.  Ultrasound of the left mass at the 6 o'clock position 5 cm from the nipple showed a 0.8 x 0.6 x 0.8 cm irregular mass with no suspicious left axillary lymph nodes.    2023: Biopsy of the left breast mass showed grade 1 invasive ductal carcinoma, ER/HI 95% positive, HER2/thomas  negative and a biopsy marker was placed at the time of the biopsy.    December 13, 2023: Patient underwent a left lumpectomy and sentinel lymph node biopsy with Dr. Jocelin Luis, final pathology showed a 1.1 cm grade 1 invasive ductal carcinoma, margins negative, 1 out of 1 lymph nodes positive with 2.8 mm of disease with microscopic KING, pT1 cN1a.  Oncotype Dx testing is pending.    Patient's case was discussed at multidisciplinary tumor board with recommendations for Oncotype testing to consider chemotherapy and refer to radiation oncology as the patient may be eligible for CCTG MA.39 trial    January 16, 2024: Patient was seen by Dr. Ball in medical oncology to discuss the role of systemic therapy.  Oncotype DX testing is pending.    Patient presents today to discuss radiation treatment.  She reports she is feeling generally well with no significant issues since her surgery.  She has healed well without evidence of infection, fever, new masses, pain, vision changes, headache, bone pain, shortness of breath, or other complaints.    Prior Radiotherapy:  No  Current Systemic Treatment:  No  Presence of Pacemaker or ICD:  No    Past Medical History:    Past Medical History:   Diagnosis Date    Asthma     Breast cancer, left breast (CMS/HCC)     Cataract     Diverticulosis     Essential (primary) hypertension 10/26/2018    Benign essential hypertension    GERD (gastroesophageal reflux disease)     Ocular hypertension, unspecified eye 08/20/2018    Ocular hypertension, unspecified laterality    Preglaucoma, unspecified, unspecified eye     Glaucoma suspect    Radiculopathy, lumbar region 12/16/2016    Lumbar radiculopathy    Unilateral primary osteoarthritis, left knee 05/21/2021    Primary localized osteoarthrosis of left lower leg    Vitamin D deficiency      Past Surgical History:    Past Surgical History:   Procedure Laterality Date    BI US GUIDED BREAST LOCALIZATION LEFT Left 12/12/2023    BI US GUIDED BREAST  LOCALIZATION LEFT 2023 Donna Ramirez MD [facility]    BREAST BIOPSY Right     benign    BREAST BIOPSY Left 2023     SECTION, CLASSIC      x 2    TOTAL KNEE ARTHROPLASTY Left     TOTAL KNEE ARTHROPLASTY Right     TUBAL LIGATION      Tubal Ligation        Family History: No family history of breast or ovarian cancer.    Past gynecologic history: Menarche at age 14, menopause at age 55,  with her first child delivered at age 28.  She has 5 to 10-year history of oral contraceptive therapy use, no hormone replacement therapy use.    Social History:    Social History     Tobacco Use    Smoking status: Never    Smokeless tobacco: Never   Substance Use Topics    Alcohol use: Never    Drug use: Never     Allergies:    Allergies   Allergen Reactions    Budesonide-Formoterol Shortness of breath, Other and Wheezing     Other reaction: asthma    Nickel Itching     Rash discoloration with cheap jewelry    Nut - Unspecified Swelling     BRAZIL NUTS ONLY      Medications:    Current Outpatient Medications:     albuterol 90 mcg/actuation inhaler, Inhale 2 puffs 4 times a day. (Patient taking differently: Inhale 2 puffs every 4 hours if needed.), Disp: 18 g, Rfl: 3    amLODIPine (Norvasc) 5 mg tablet, Take 1 tablet (5 mg) by mouth once every day. For blood pressure, Disp: 90 tablet, Rfl: 1    apple cider vinegar 500 mg tablet, Take 1 tablet by mouth once daily., Disp: , Rfl:     baclofen (Lioresal) 10 mg tablet, Take 1 tablet (10 mg) by mouth 3 times a day as needed for muscle spasms., Disp: 30 tablet, Rfl: 0    calcium carbonate-vitamin D3 (Calcium 600 with Vitamin D3) 600 mg-10 mcg (400 unit) chewable tablet, Chew 1 tablet once daily., Disp: , Rfl:     fluticasone-umeclidin-vilanter (Trelegy Ellipta) 200-62.5-25 mcg blister with device, Inhale 1 puff once daily in the morning. Take before meals., Disp: 60 each, Rfl: 5    ibuprofen 800 mg tablet, Take 1 tablet (800 mg) by mouth 2 times a day as needed.,  Disp: , Rfl:     ipratropium-albuteroL (Duo-Neb) 0.5-2.5 mg/3 mL nebulizer solution, Take 3 mL by nebulization 2 times a day., Disp: 180 mL, Rfl: 5    latanoprost (Xalatan) 0.005 % ophthalmic solution, Administer 1 drop into both eyes once daily at bedtime., Disp: 2.5 mL, Rfl: 3    losartan (Cozaar) 100 mg tablet, Take 1 tablet (100 mg) by mouth once daily., Disp: 90 tablet, Rfl: 1    metoprolol tartrate (Lopressor) 50 mg tablet, Take 1 tablet by mouth 2 times a day., Disp: 180 tablet, Rfl: 1    montelukast (Singulair) 10 mg tablet, Take 1 tablet (10 mg) by mouth once daily at bedtime., Disp: 90 tablet, Rfl: 1    multivitamin tablet, Take 1 tablet by mouth once every day., Disp: , Rfl:     vit C/vit E ac/selenium/ginkgo (MEMORY COMPLEX ORAL), Take by mouth., Disp: , Rfl:       Review of Systems:  Review of Systems - Oncology  The patient's current pain level was assessed.  They report currently having a pain of 0 out of 10.  They feel their pain is under control without the use of pain medications.  14 point review of systems reviewed at length patient is positive as per note in the HPI.    Performance Status:  The Karnofsky performance scale today is 100, Fully active, able to carry on all pre-disease performed without restriction (ECOG equivalent 0).      OBJECTIVE  Physical Exam:  There were no vitals taken for this visit.   General: no acute distress, engaged in conversation.   HEENT: Normocephalic, atraumatic. Extraocular movements are intact.   Neck: supple with trachea at midline, no palpable adenopathy.   Pulmonary: Breathing comfortably on room air, no respiratory distress  Cardiovascular: Regular rate, no cyanosis, well-perfused  Abdomen: Soft, nontender, nondistended.   Extremities: No lower extremity edema or cyanosis. Normal range of motion.  Skin: Without rash or obvious lesions.   Musculoskeletal: Normal range of motion. Able to raise both arms above head without issues  Neurologic: Alert and oriented  x3. Cranial nerves grossly intact.   Breast: left breast with well healed incision, no erythema, masses, or discharge noted. No adenopathy noted    Laboratory Review:  There are no laboratory contraindications to radiation therapy.  The pertinent lab results were reviewed and discussed with the patient.      Pathology Review:  The pertinent pathology results were reviewed and discussed with the patient.    SPECIMEN   Procedure  Excision (less than total mastectomy)   Specimen Laterality  Left   TUMOR   Histologic Type  Invasive carcinoma of no special type (ductal)   Histologic Grade ([place] Histologic Score)     Glandular (Acinar) / Tubular Differentiation  Score 2   Nuclear Pleomorphism  Score 2   Mitotic Rate  Score 1   Overall Grade  Grade 1 (scores of 3, 4 or 5)   Tumor Size  Greatest dimension of largest invasive focus (Millimeters): 11 mm   Tumor Focality  Single focus of invasive carcinoma   Ductal Carcinoma In Situ (DCIS)  Not identified   Lobular Carcinoma In Situ (LCIS)  Not identified   Lymphatic and / or Vascular Invasion  Cannot be determined   Microcalcifications  Not identified   Treatment Effect in the Breast  No known presurgical therapy   MARGINS   Margin Status for Invasive Carcinoma  All margins negative for invasive carcinoma   Distance from Invasive Carcinoma to Closest Margin  Greater than: 2 mm   REGIONAL LYMPH NODES   Regional Lymph Node Status  Tumor present in regional lymph node(s)   Number of Lymph Nodes with Macrometastases  1   Number of Lymph Nodes with Micrometastases  0   Number of Lymph Nodes with Isolated Tumor Cells  0   Size of Largest [place] Metastatic Deposit  2.8 mm   Extranodal Extension  Present, 2 mm or less   Total Number of Lymph Nodes Examined (sentinel and non-sentinel)  1   Number of [place] Nodes Examined  1   pTNM CLASSIFICATION (AJCC 8th Edition)   Reporting of pT, pN, and (when applicable) pM categories is based on information available to the pathologist at  the time the report is issued. As per the AJCC (Chapter 1, 8th Ed.) it is the managing physician’s responsibility to establish the final pathologic stage based upon all pertinent information, including but potentially not limited to this pathology report.   pT Category  pT1c   pN Category  pN1a   N Suffix  (sn)   SPECIAL STUDIES        Estrogen Receptor (ER) Status  Positive (greater than 10% of cells demonstrate nuclear positivity)   Percentage of Cells with Nuclear Positivity  >95 %        Progesterone Receptor (PgR) Status  Positive   Percentage of Cells with Nuclear Positivity  >95 %        HER2 (by immunohistochemistry)  Negative (Score 1+)     Oncotype Dx score pending    Imaging:  The pertinent imaging results were reviewed and discussed with the patient.  These are as noted in the HPI       ASSESSMENT:  Betty Mendieta is a 68 y.o. female with Malignant neoplasm of lower-outer quadrant of left breast of female, estrogen receptor positive (CMS/HCC), Pathologic: Stage IA (pT1c, pN1a(sn), cM0, G1, ER+, NC+, HER2-).  Status postlumpectomy and sentinel lymph node biopsy with final pathology showing 1.1 cm grade 1 invasive ductal carcinoma, ER/NC positive, HER2/thomas negative, negative surgical margins, 1 out of 1 lymph node involved with 2.8 mm of disease, microscopic KING, Oncotype DX score pending.  Potential MA.39 trial candidate.    The patient is doing well > 4 weeks out from her lumpectomy and SLNB, with well-healing incision, no significant seroma or lumpectomy retraction, and no other suspicious findings in the breast.     We discussed with Ms. Mendieta that lumpectomy followed by whole breast radiation is the standard of care for breast conservation candidates. Post-lumpectomy radiation reduces the risk of locoregional recurrences by 2/3, reduces the risk of overall recurrence by 1/2, and afford a modest survival benefit according to the EBCTCG meta-analysis. Even among ER+ patients treated with tamoxifen, WBRT  still decreases the risk of ipsilateral breast tumor (from ~ 16.5% with tamoxifen alone to 9% with radiation alone to 2.8% with tamoxifen + radiation according to NSABP B-21).     We also discussed our recommendation for regional kimberlee irradiation off trial given her 1/1 LN+ with KING. There are at least two trials investigating the addition of regional kimberlee irradiation in the setting of high-risk disease, MA.20 and EORT 06553. Both of these trials included patients with node-positive disease or with high-risk node-negative disease. Both of these trials showed an improvement in disease-free survival with the addition of regional kimberlee irradiation, although this did not impact overall survival. In the MA.20 trial, this also improved local recurrence and distant metastases. Patients with hormone-positive disease who received hormonal therapy and chemotherapy appeared to be more likely to benefit from regional kimberlee irradiation in the EORTC 48772 trial. These trials included radiation to the level 3/undissected axilla, supraclavicular nodes, and internal mammary nodes. We discussed that approximately 14% of the patients of the START trials received kimberlee irradiation, and that there is an older [place] trial where patients received hypofractionated kimberlee irradiation. Finally, we discussed the recently presented study of Radiation Fractionation on Patient Outcomes After Breast REConstruction (FABREC) trial. This 400 patient randomized trial compared standard 6-7 week radiation with a shorter 3-4 week course. Both the accelerated and standard courses of treatment were equally effective at preventing the cancer from returning and had the same level of side effects. The patients in the shorter course treatment arm experienced fewer treatment disruptions and a lower financial burden as well. Thus, we feel comfortable delivering regional kimberlee irradiation in a hypofractionated fashion.    We did discuss the  currently enrolling a MA.39 trial which is exploring regional kimberlee radiation omission in women with a low Oncotype score.  Pending her Oncotype results she may be eligible for this trial and she will consider enrollment.    Additionally, we discussed that long term follow up of the trials discussed above (and others) suggest that patients may even have decreased acute toxicity and improved cosmesis with hypofractionation. After a thorough discussion of the above, the patient elected for hypofractionation.     Finally, we discussed the side effects and risk of WBRT and regional kimberlee treatment, including the need for CT simulation, tattoo placement, and daily radiation (M-F) x 20 days. We discussed the side effects and risks of radiation, including fatigue, radiation dermatitis, and breast pain in the short-term; skin fibrosis, pigmentation, chest wall pain, damage to underlying lung, rib, or heart, and secondary malignancies in the long-term. All questions were answered to the best of our ability and informed consent was obtained.      PLAN:    - discussed CCTG MA.39 trial, patient will consider if eligible  - off trial would plan left breast and RNI treatment to 16 fractions to 42.56 GY no boost, will consider ABC for [facility]    NCCN Guidelines were applicable to guide this patients treatment plan.     Yaya Taylor MD, PhD  Attending Physician    Over 60 minutes was spent in evaluating, counseling, and examining the patient. More than 50% of that time was spent in face to face discussion.

## 2024-01-25 NOTE — ADDENDUM NOTE
Encounter addended by: Yaya Taylor MD PhD on: 1/25/2024 6:12 PM   Actions taken: Visit diagnoses modified

## 2024-01-29 ENCOUNTER — HOSPITAL ENCOUNTER (OUTPATIENT)
Dept: RADIOLOGY | Facility: EXTERNAL LOCATION | Age: 69
Discharge: HOME | End: 2024-01-29

## 2024-01-29 ENCOUNTER — HOSPITAL ENCOUNTER (OUTPATIENT)
Dept: RADIATION ONCOLOGY | Facility: HOSPITAL | Age: 69
Setting detail: RADIATION/ONCOLOGY SERIES
Discharge: HOME | End: 2024-01-29
Payer: MEDICARE

## 2024-01-29 DIAGNOSIS — C50.812 MALIGNANT NEOPLASM OF OVERLAPPING SITES OF LEFT FEMALE BREAST, UNSPECIFIED ESTROGEN RECEPTOR STATUS (MULTI): Primary | ICD-10-CM

## 2024-01-29 DIAGNOSIS — C50.812 MALIGNANT NEOPLASM OF OVERLAPPING SITES OF LEFT FEMALE BREAST, UNSPECIFIED ESTROGEN RECEPTOR STATUS (MULTI): ICD-10-CM

## 2024-01-29 PROCEDURE — 77332 RADIATION TREATMENT AID(S): CPT | Performed by: RADIOLOGY

## 2024-01-29 PROCEDURE — 77334 RADIATION TREATMENT AID(S): CPT | Performed by: RADIOLOGY

## 2024-01-31 ENCOUNTER — TELEPHONE (OUTPATIENT)
Dept: HEMATOLOGY/ONCOLOGY | Facility: HOSPITAL | Age: 69
End: 2024-01-31
Payer: MEDICARE

## 2024-02-08 ENCOUNTER — OFFICE VISIT (OUTPATIENT)
Dept: HEMATOLOGY/ONCOLOGY | Facility: HOSPITAL | Age: 69
End: 2024-02-08
Payer: MEDICARE

## 2024-02-08 VITALS
TEMPERATURE: 97.9 F | HEART RATE: 66 BPM | WEIGHT: 219.9 LBS | DIASTOLIC BLOOD PRESSURE: 80 MMHG | RESPIRATION RATE: 16 BRPM | SYSTOLIC BLOOD PRESSURE: 133 MMHG | BODY MASS INDEX: 40.22 KG/M2

## 2024-02-08 DIAGNOSIS — M85.852 OSTEOPENIA OF NECKS OF BOTH FEMURS: ICD-10-CM

## 2024-02-08 DIAGNOSIS — C50.512 MALIGNANT NEOPLASM OF LOWER-OUTER QUADRANT OF LEFT BREAST OF FEMALE, ESTROGEN RECEPTOR POSITIVE (MULTI): Primary | ICD-10-CM

## 2024-02-08 DIAGNOSIS — Z17.0 MALIGNANT NEOPLASM OF BREAST IN FEMALE, ESTROGEN RECEPTOR POSITIVE, UNSPECIFIED LATERALITY, UNSPECIFIED SITE OF BREAST (MULTI): ICD-10-CM

## 2024-02-08 DIAGNOSIS — Z17.0 MALIGNANT NEOPLASM OF LOWER-OUTER QUADRANT OF LEFT BREAST OF FEMALE, ESTROGEN RECEPTOR POSITIVE (MULTI): Primary | ICD-10-CM

## 2024-02-08 DIAGNOSIS — C50.919 MALIGNANT NEOPLASM OF BREAST IN FEMALE, ESTROGEN RECEPTOR POSITIVE, UNSPECIFIED LATERALITY, UNSPECIFIED SITE OF BREAST (MULTI): ICD-10-CM

## 2024-02-08 DIAGNOSIS — M85.851 OSTEOPENIA OF NECKS OF BOTH FEMURS: ICD-10-CM

## 2024-02-08 PROCEDURE — 1126F AMNT PAIN NOTED NONE PRSNT: CPT

## 2024-02-08 PROCEDURE — 1036F TOBACCO NON-USER: CPT

## 2024-02-08 PROCEDURE — 99214 OFFICE O/P EST MOD 30 MIN: CPT

## 2024-02-08 PROCEDURE — 1160F RVW MEDS BY RX/DR IN RCRD: CPT

## 2024-02-08 PROCEDURE — 3075F SYST BP GE 130 - 139MM HG: CPT

## 2024-02-08 PROCEDURE — 3079F DIAST BP 80-89 MM HG: CPT

## 2024-02-08 PROCEDURE — 1159F MED LIST DOCD IN RCRD: CPT

## 2024-02-08 ASSESSMENT — ENCOUNTER SYMPTOMS
BRUISES/BLEEDS EASILY: 0
ABDOMINAL DISTENTION: 0
DIZZINESS: 0
SHORTNESS OF BREATH: 0
HEMOPTYSIS: 0
EYES NEGATIVE: 1
COUGH: 0
FREQUENCY: 0
PALPITATIONS: 0
SEIZURES: 0
MYALGIAS: 0
HOT FLASHES: 0
DIAPHORESIS: 0
SCLERAL ICTERUS: 0
APPETITE CHANGE: 0
CONFUSION: 0
CONSTITUTIONAL NEGATIVE: 1
FATIGUE: 0
ADENOPATHY: 0
LEG SWELLING: 0
ARTHRALGIAS: 0
CARDIOVASCULAR NEGATIVE: 1
RESPIRATORY NEGATIVE: 1
CONSTIPATION: 0
NUMBNESS: 0
BACK PAIN: 0
NERVOUS/ANXIOUS: 0
CHILLS: 0
FEVER: 0
HEMATURIA: 0
DIFFICULTY URINATING: 0
EYE PROBLEMS: 0
EXTREMITY WEAKNESS: 0
CHEST TIGHTNESS: 0
DIARRHEA: 0
WHEEZING: 0
SLEEP DISTURBANCE: 0
HEADACHES: 0
DEPRESSION: 0
NAUSEA: 0
ABDOMINAL PAIN: 0
DYSURIA: 0
BLOOD IN STOOL: 0

## 2024-02-08 NOTE — PROGRESS NOTES
Breast Medical Oncology Clinic  Location: [facility]      BREAST CANCER DIAGNOSIS  Malignant neoplasm of lower-outer quadrant of left breast of female, estrogen receptor positive (CMS/HCC), Pathologic: Stage IA (pT1c, pN1a(sn), cM0, G1, ER+, [place]+, HER2-)       HISTORY OF PRESENT ILLNESS    Betty Mendieta is a 68 y.o. woman s/p lumpectomy on 12/13/23 with left sided 1.1 IDC and 1/1 SLN, I.e. dU2cM4d ER/NY>95% HER2 neg. Oncotype DX 13, favorable. She is here prior to initiation of [facility] MA.39 trial. She has been consented and is awaiting randomization.     She denies any new breast cancer concerns.     She denies any chest pain or breathing issues.     She denies any vision changes or headache issues, dizziness, loss of balance or falls.     She denies any new or unexplained bone aches or pains.    She denies any skin lesions or masses.    She reports a normal appetite and normal bowel movements.    She denies any issues with sleep, fatigue, hot flashes or mood swings.       Review of Systems   Constitutional: Negative.  Negative for appetite change, chills, diaphoresis, fatigue and fever.   HENT:  Negative.  Negative for hearing loss and lump/mass.    Eyes: Negative.  Negative for eye problems and icterus.   Respiratory: Negative.  Negative for chest tightness, cough, hemoptysis, shortness of breath and wheezing.    Cardiovascular: Negative.  Negative for chest pain, leg swelling and palpitations.   Gastrointestinal:  Negative for abdominal distention, abdominal pain, blood in stool, constipation, diarrhea and nausea.   Endocrine: Negative for hot flashes.   Genitourinary:  Negative for bladder incontinence, difficulty urinating, dyspareunia, dysuria, frequency and hematuria.    Musculoskeletal:  Negative for arthralgias, back pain, gait problem and myalgias.   Neurological:  Negative for dizziness, extremity weakness, gait problem, headaches, numbness and seizures.   Hematological:  Negative for adenopathy. Does not  bruise/bleed easily.   Psychiatric/Behavioral:  Negative for confusion, depression and sleep disturbance. The patient is not nervous/anxious.    All other systems reviewed and are negative.        Past Medical History:  has a past medical history of Asthma, Breast cancer, left breast (CMS/HCC), Cataract, Diverticulosis, Essential (primary) hypertension (10/26/2018), GERD (gastroesophageal reflux disease), Ocular hypertension, unspecified eye (2018), Preglaucoma, unspecified, unspecified eye, Radiculopathy, lumbar region (2016), Unilateral primary osteoarthritis, left knee (2021), and Vitamin D deficiency.  Surgical History:   has a past surgical history that includes  section, classic; Tubal ligation; Breast biopsy (Right); Breast biopsy (Left, 2023); Total knee arthroplasty (Left); Total knee arthroplasty (Right); BI US guided breast localization left (Left, 2023); and Breast lumpectomy.  Social History:   reports that she has never smoked. She has never used smokeless tobacco. She reports that she does not drink alcohol and does not use drugs.  Family History:    Family History   Problem Relation Name Age of Onset    Hypertension Mother      Asthma Father      Hypertension Father      Glaucoma Sister      Hypertension Sister      Stroke Brother       Family Oncology History:  Cancer-related family history is not on file.      OBJECTIVE    VS / Pain:  There were no vitals taken for this visit.  BSA: There is no height or weight on file to calculate BSA.   Pain Scale: 0    Performance Status:  The ECOG performance scale today is ECO- Fully active, able to carry on all pre-disease performance w/o restriction.    Physical Exam  Vitals reviewed.   Constitutional:       General: She is awake. She is not in acute distress.     Appearance: Normal appearance. She is not ill-appearing.   HENT:      Mouth/Throat:      Pharynx: Oropharynx is clear. No oropharyngeal exudate.   Eyes:       General: No scleral icterus.     Conjunctiva/sclera: Conjunctivae normal.   Neck:      Trachea: Trachea and phonation normal. No tracheal tenderness.   Cardiovascular:      Rate and Rhythm: Normal rate and regular rhythm.      Heart sounds: No murmur heard.     No friction rub. No gallop.   Pulmonary:      Effort: Pulmonary effort is normal. No respiratory distress.      Breath sounds: Normal breath sounds. No stridor. No wheezing, rhonchi or rales.   Chest:      Chest wall: No mass, lacerations, deformity or tenderness.   Breasts:     Right: No swelling, mass, nipple discharge, skin change or tenderness.      Left: No swelling, mass, nipple discharge, skin change or tenderness.      Comments: S/p left lumpectomy well healed   Abdominal:      General: Abdomen is flat. There is no distension.      Palpations: Abdomen is soft. There is no mass.      Tenderness: There is no abdominal tenderness.   Lymphadenopathy:      Cervical: No cervical adenopathy.      Upper Body:      Right upper body: No supraclavicular, axillary or pectoral adenopathy.      Left upper body: No supraclavicular, axillary or pectoral adenopathy.   Skin:     General: Skin is warm and dry.      Coloration: Skin is not jaundiced.      Findings: No lesion or rash.   Neurological:      General: No focal deficit present.      Mental Status: She is alert and oriented to person, place, and time.      Motor: No weakness.      Gait: Gait normal.   Psychiatric:         Mood and Affect: Mood normal.         Thought Content: Thought content normal.         Judgment: Judgment normal.             Diagnostic Results        Study Result  Interpreted By: KALEB SIM MD  MRN: [id number]  Patient Name: CONNOR SMILEY    STUDY:  BONE DENSITY, DEXA 1 OR MORE SITES: AXIAL SKELETN9/25/2023 10:05 am    INDICATION:  screening. The patient is a 69 y/o year old F.    COMPARISON:  No comparison available.    ACCESSION NUMBER(S):  [id number]    ORDERING CLINICIAN:  RENZO  HEAD    TECHNIQUE:  BONE DENSITY, DEXA 1 OR MORE SITES: AXIAL SKELETN    FINDINGS:  SPINE L1-L4  Bone Mineral Density: 1.042  T-Score 0.0 Z-Score 1  Classification: Normal  Bone Mineral Density change vs baseline: Not reported  Bone Mineral Density change vs previous: Not reported    LEFT FEMUR -TOTAL  Bone Mineral Density: 0.807  T-Score -1.1 Z-Score -0.4  Classification: Osteopenia  Bone Mineral Density change vs baseline: Not reported  Bone Mineral Density change vs previous: Not reported    LEFT FEMUR -NECK  Bone Mineral Density: 0.648  T-Score -1.8 Z-Score -0.8  Classification: Osteopenia      World Health Organization (WHO) criteria for post-menopausal,   Women:  Normal: T-score at or above -1 SD  Osteopenia: T-score between -1 and -2.5 SD  Osteoporosis: T-score at or below -2.5 SD      10-year Fracture Risk:  Major Osteoporotic Fracture 4.1  Hip Fracture 0.5    Note: If no FRAX score is reported, it is because:  Some T-score for Spine Total or Hip Total or Femoral Neck at or below  -2.5    This exam was performed at [facility] on a [employer] [facility] Dexa Unit.      IMPRESSION:  DEXA: According to World Health Organization criteria,  classification is  low bone mass (osteopenia)    Followup recommended in two years or sooner as clinically warranted.    All images and detailed analysis are available on the  Radiology  PACS.    MACRO:  None       BI US breast limited bilateral 10/16/2023    Narrative  Interpreted By:  Jay Linder,  STUDY:  BI US BREAST LIMITED BILATERAL;  10/16/2023 11:12 am    ACCESSION NUMBER(S):  [id number]    ORDERING CLINICIAN:  RENZO BRYSON    INDICATION:  Callback from screening. Nipple discharge.    COMPARISON:  Mammogram dated 09/26/2023    FINDINGS:  The patient was recalled for a mass in the left breast. Since her  screening mammogram, she has developed right nipple discharge.    Sonographic images were obtained of the right breast in the  subareolar  region to evaluate the nipple discharge. No suspicious  sonographic abnormality is identified. There is a dilated duct which  demonstrates no internal echoes or intraductal masses. Color Doppler  imaging demonstrates normal vascularity. Elastography shows soft  tissue.    The left breast was evaluated. At the 6 o'clock position 5 cm from  the nipple the correlate for the mammographic mass is identified. It  is a irregular ill-defined hypoechoic mass with internal vascularity  which demonstrates areas of increased stiffness on elastography. The  mass measures 0.8 x 0.6 x 0.8 cm.    The left axilla demonstrates only normal appearing lymph nodes.    Impression  Mass for which the patient was recalled demonstrates both suspicious  features mammographically and sonographically. An ultrasound-guided  biopsy is recommended.    No suspicious findings seen to explain the patient's nipple  discharge. Clinical follow-up is recommended.    The findings and recommendations were discussed with the patient the  time of interpretation. She will be scheduled for surgical  consultation and biopsy. A notification was sent to the ordering  provider.    Critical Finding:  See findings. Notification was initiated on  10/16/2023 at 11:47 am by  Jay Linder.  (**-YCF-**) Instructions:  See Impression for specific recommendations.    BI-RADS CATEGORY:    BI-RADS Category:  4 Suspicious.  Recommendation:  Biopsy.  Recommended Date:  Immediate.  Laterality:  Left    For any future breast imaging appointments, please call [phone]).      MACRO:  None      Signed by: Jay Linder 10/16/2023 11:47 AM  Dictation workstation:   [id number]     No images are attached to the encounter.    LABORATORY/PATHOLOGY DATA    Lab on 01/08/2024   Component Date Value Ref Range Status    Magnesium 01/08/2024 1.80  1.60 - 2.40 mg/dL Final    Vitamin B12 01/08/2024 988 (H)  211 - 911 pg/mL Final    Vitamin D, 25-Hydroxy, Total 01/08/2024 39  30 -  100 ng/mL Final          IMPRESSION/PLAN    Left sided pT1cN1 ER/NH+ & HER2 neg low clinical risk breast cancer. Favorable oncotype of 13, therefore no chemotherapy benefit.  Did meet with Dr. Taylor regarding radiation, she opted to pursue [facility] MA.39. She is medically fit for trial, awaiting randomization.  Recommend 5-7 yrs of anastrozole depending on tolerability after radiation.  Will see her back post XRT. Info on anastrozole given to her at last office visit.     There is no evidence of breast cancer recurrence based on her clinical exam today.     Moderate osteopenia w/jf T of -1.8 (left femur neck).  Would recommend adjuvant bisphosphonates given she will need 5-7 yrs of anastrozole.  - Information given on zoledronic acid with a brief discussion of benefits and side effects/ADRs       Sanjana Quispe PA-C   [facility]

## 2024-02-13 ENCOUNTER — OFFICE VISIT (OUTPATIENT)
Dept: PRIMARY CARE | Facility: CLINIC | Age: 69
End: 2024-02-13
Payer: MEDICARE

## 2024-02-13 VITALS — HEART RATE: 78 BPM | SYSTOLIC BLOOD PRESSURE: 132 MMHG | DIASTOLIC BLOOD PRESSURE: 82 MMHG | OXYGEN SATURATION: 95 %

## 2024-02-13 DIAGNOSIS — R21 RASH: Primary | ICD-10-CM

## 2024-02-13 PROCEDURE — 3075F SYST BP GE 130 - 139MM HG: CPT | Performed by: PHYSICIAN ASSISTANT

## 2024-02-13 PROCEDURE — 1159F MED LIST DOCD IN RCRD: CPT | Performed by: PHYSICIAN ASSISTANT

## 2024-02-13 PROCEDURE — 3079F DIAST BP 80-89 MM HG: CPT | Performed by: PHYSICIAN ASSISTANT

## 2024-02-13 PROCEDURE — 1126F AMNT PAIN NOTED NONE PRSNT: CPT | Performed by: PHYSICIAN ASSISTANT

## 2024-02-13 PROCEDURE — 99213 OFFICE O/P EST LOW 20 MIN: CPT | Performed by: PHYSICIAN ASSISTANT

## 2024-02-13 PROCEDURE — 1036F TOBACCO NON-USER: CPT | Performed by: PHYSICIAN ASSISTANT

## 2024-02-13 PROCEDURE — 1160F RVW MEDS BY RX/DR IN RCRD: CPT | Performed by: PHYSICIAN ASSISTANT

## 2024-02-13 RX ORDER — METHYLPREDNISOLONE ACETATE 80 MG/ML
80 INJECTION, SUSPENSION INTRA-ARTICULAR; INTRALESIONAL; INTRAMUSCULAR; SOFT TISSUE ONCE
Status: SHIPPED | OUTPATIENT
Start: 2024-02-13

## 2024-02-13 NOTE — PROGRESS NOTES
Subjective   Betty Mendieta is a 68 y.o. female who presents for rash on face . States symptoms began after getting her chin waxed 8/2023. At that time her face was just itchy. She went back again 11/2023 for another chin wax. Following this wax she broke out in a visible itchy rash. Since then the rash has waxed/waned. She has been using hydrocortisone cream, cortisone cream, rubbing alcohol, and antibiotic ointment on the area with no significant improvement. She feels like the rash is now spreading up her face. She is not using face wash, makeup, chapstick/lipstick. She has not changed toothpaste or mouthwash brands. No swelling of the face, tongue, lips. No difficulty breathing or SOB/GRAY.      12 point ROS reviewed and negative other than as stated in HPI    /82   Pulse 78   SpO2 95%   Objective   Physical Exam  Vitals reviewed.   Constitutional:       General: She is not in acute distress.     Appearance: Normal appearance. She is not ill-appearing.   HENT:      Head: Normocephalic and atraumatic.   Eyes:      General: No scleral icterus.     Extraocular Movements: Extraocular movements intact.      Conjunctiva/sclera: Conjunctivae normal.      Pupils: Pupils are equal, round, and reactive to light.   Cardiovascular:      Rate and Rhythm: Normal rate and regular rhythm.      Heart sounds: Normal heart sounds. No murmur heard.     No friction rub. No gallop.   Pulmonary:      Effort: Pulmonary effort is normal. No respiratory distress.      Breath sounds: Normal breath sounds. No stridor. No wheezing, rhonchi or rales.   Musculoskeletal:      Cervical back: Normal range of motion.      Right lower leg: No edema.      Left lower leg: No edema.   Skin:     General: Skin is warm and dry.      Findings: Rash (Very small flash colored papules of the anterior neck and chin consistent with uticaria. Located in areas of facial hair growth) present.   Neurological:      Mental Status: She is alert and oriented to  person, place, and time. Mental status is at baseline.      Cranial Nerves: No cranial nerve deficit.      Gait: Gait normal.   Psychiatric:         Mood and Affect: Mood normal.         Behavior: Behavior normal.         Assessment/Plan   Problem List Items Addressed This Visit       Rash - Primary     Rash consistent with follicular traction urticaria. IM injection of methylprednisolone 80mg administered in office, tolerated well. Begin taking OTC antihistamine at bedtime. Apply cool packs to the area to decrease itching. Avoid future facial waxes.          Relevant Medications    methylPREDNISolone acetate (DEPO-Medrol) injection 80 mg (Start on 2/13/2024  1:45 PM)        Follow up in 2 months with Dr. Johnson as scheduled or sooner as needed

## 2024-02-13 NOTE — ASSESSMENT & PLAN NOTE
Rash consistent with follicular traction urticaria. IM injection of methylprednisolone 80mg administered in office, tolerated well. Begin taking OTC antihistamine at bedtime. Apply cool packs to the area to decrease itching. Avoid future facial waxes.

## 2024-02-19 ENCOUNTER — HOSPITAL ENCOUNTER (OUTPATIENT)
Dept: RADIATION ONCOLOGY | Facility: HOSPITAL | Age: 69
Setting detail: RADIATION/ONCOLOGY SERIES
Discharge: HOME | End: 2024-02-19
Payer: MEDICARE

## 2024-02-19 PROCEDURE — 77338 DESIGN MLC DEVICE FOR IMRT: CPT | Performed by: RADIOLOGY

## 2024-02-19 PROCEDURE — 77301 RADIOTHERAPY DOSE PLAN IMRT: CPT | Performed by: RADIOLOGY

## 2024-02-19 PROCEDURE — 77300 RADIATION THERAPY DOSE PLAN: CPT | Performed by: RADIOLOGY

## 2024-02-21 ENCOUNTER — LAB (OUTPATIENT)
Dept: LAB | Facility: HOSPITAL | Age: 69
End: 2024-02-21
Payer: MEDICARE

## 2024-02-21 ENCOUNTER — HOSPITAL ENCOUNTER (OUTPATIENT)
Dept: RADIATION ONCOLOGY | Facility: HOSPITAL | Age: 69
Setting detail: RADIATION/ONCOLOGY SERIES
Discharge: HOME | End: 2024-02-21
Payer: MEDICARE

## 2024-02-21 DIAGNOSIS — C50.812 MALIGNANT NEOPLASM OF OVERLAPPING SITES OF LEFT FEMALE BREAST (MULTI): ICD-10-CM

## 2024-02-21 DIAGNOSIS — C50.919 MALIGNANT NEOPLASM OF BREAST IN FEMALE, ESTROGEN RECEPTOR POSITIVE, UNSPECIFIED LATERALITY, UNSPECIFIED SITE OF BREAST (MULTI): ICD-10-CM

## 2024-02-21 DIAGNOSIS — Z51.0 ENCOUNTER FOR ANTINEOPLASTIC RADIATION THERAPY: ICD-10-CM

## 2024-02-21 DIAGNOSIS — Z17.0 MALIGNANT NEOPLASM OF BREAST IN FEMALE, ESTROGEN RECEPTOR POSITIVE, UNSPECIFIED LATERALITY, UNSPECIFIED SITE OF BREAST (MULTI): ICD-10-CM

## 2024-02-21 LAB
RAD ONC MSQ ACTUAL FRACTIONS DELIVERED: 1
RAD ONC MSQ ACTUAL SESSION DELIVERED DOSE: 266 CGRAY
RAD ONC MSQ ACTUAL TOTAL DOSE: 266 CGRAY
RAD ONC MSQ ELAPSED DAYS: 0
RAD ONC MSQ LAST DATE: NORMAL
RAD ONC MSQ PRESCRIBED FRACTIONAL DOSE: 266 CGRAY
RAD ONC MSQ PRESCRIBED NUMBER OF FRACTIONS: 16
RAD ONC MSQ PRESCRIBED TECHNIQUE: NORMAL
RAD ONC MSQ PRESCRIBED TOTAL DOSE: 4256 CGRAY
RAD ONC MSQ PRESCRIPTION PATTERN COMMENT: NORMAL
RAD ONC MSQ START DATE: NORMAL
RAD ONC MSQ TREATMENT COURSE NUMBER: 1
RAD ONC MSQ TREATMENT SITE: NORMAL

## 2024-02-21 PROCEDURE — 77336 RADIATION PHYSICS CONSULT: CPT | Performed by: RADIOLOGY

## 2024-02-21 PROCEDURE — 77014 CHG CT GUIDANCE RADIATION THERAPY FLDS PLACEMENT: CPT | Performed by: RADIOLOGY

## 2024-02-21 PROCEDURE — 77385 HC INTENSITY-MODULATED RADIATION THERAPY (IMRT), SIMPLE: CPT | Performed by: RADIOLOGY

## 2024-02-22 ENCOUNTER — HOSPITAL ENCOUNTER (OUTPATIENT)
Dept: RADIATION ONCOLOGY | Facility: HOSPITAL | Age: 69
Setting detail: RADIATION/ONCOLOGY SERIES
Discharge: HOME | End: 2024-02-22
Payer: MEDICARE

## 2024-02-22 DIAGNOSIS — C50.812 MALIGNANT NEOPLASM OF OVERLAPPING SITES OF LEFT FEMALE BREAST (MULTI): ICD-10-CM

## 2024-02-22 DIAGNOSIS — Z51.0 ENCOUNTER FOR ANTINEOPLASTIC RADIATION THERAPY: ICD-10-CM

## 2024-02-22 LAB
RAD ONC MSQ ACTUAL FRACTIONS DELIVERED: 2
RAD ONC MSQ ACTUAL SESSION DELIVERED DOSE: 266 CGRAY
RAD ONC MSQ ACTUAL TOTAL DOSE: 532 CGRAY
RAD ONC MSQ ELAPSED DAYS: 1
RAD ONC MSQ LAST DATE: NORMAL
RAD ONC MSQ PRESCRIBED FRACTIONAL DOSE: 266 CGRAY
RAD ONC MSQ PRESCRIBED NUMBER OF FRACTIONS: 16
RAD ONC MSQ PRESCRIBED TECHNIQUE: NORMAL
RAD ONC MSQ PRESCRIBED TOTAL DOSE: 4256 CGRAY
RAD ONC MSQ PRESCRIPTION PATTERN COMMENT: NORMAL
RAD ONC MSQ START DATE: NORMAL
RAD ONC MSQ TREATMENT COURSE NUMBER: 1
RAD ONC MSQ TREATMENT SITE: NORMAL

## 2024-02-22 PROCEDURE — 77014 CHG CT GUIDANCE RADIATION THERAPY FLDS PLACEMENT: CPT | Performed by: RADIOLOGY

## 2024-02-22 PROCEDURE — 77385 HC INTENSITY-MODULATED RADIATION THERAPY (IMRT), SIMPLE: CPT | Performed by: RADIOLOGY

## 2024-02-23 ENCOUNTER — HOSPITAL ENCOUNTER (OUTPATIENT)
Dept: RADIATION ONCOLOGY | Facility: HOSPITAL | Age: 69
Setting detail: RADIATION/ONCOLOGY SERIES
Discharge: HOME | End: 2024-02-23
Payer: MEDICARE

## 2024-02-23 DIAGNOSIS — C50.812 MALIGNANT NEOPLASM OF OVERLAPPING SITES OF LEFT FEMALE BREAST (MULTI): ICD-10-CM

## 2024-02-23 DIAGNOSIS — Z51.0 ENCOUNTER FOR ANTINEOPLASTIC RADIATION THERAPY: ICD-10-CM

## 2024-02-23 LAB
RAD ONC MSQ ACTUAL FRACTIONS DELIVERED: 3
RAD ONC MSQ ACTUAL SESSION DELIVERED DOSE: 266 CGRAY
RAD ONC MSQ ACTUAL TOTAL DOSE: 798 CGRAY
RAD ONC MSQ ELAPSED DAYS: 2
RAD ONC MSQ LAST DATE: NORMAL
RAD ONC MSQ PRESCRIBED FRACTIONAL DOSE: 266 CGRAY
RAD ONC MSQ PRESCRIBED NUMBER OF FRACTIONS: 16
RAD ONC MSQ PRESCRIBED TECHNIQUE: NORMAL
RAD ONC MSQ PRESCRIBED TOTAL DOSE: 4256 CGRAY
RAD ONC MSQ PRESCRIPTION PATTERN COMMENT: NORMAL
RAD ONC MSQ START DATE: NORMAL
RAD ONC MSQ TREATMENT COURSE NUMBER: 1
RAD ONC MSQ TREATMENT SITE: NORMAL

## 2024-02-23 PROCEDURE — 77385 HC INTENSITY-MODULATED RADIATION THERAPY (IMRT), SIMPLE: CPT | Performed by: RADIOLOGY

## 2024-02-23 PROCEDURE — 77014 CHG CT GUIDANCE RADIATION THERAPY FLDS PLACEMENT: CPT | Performed by: RADIOLOGY

## 2024-02-26 ENCOUNTER — HOSPITAL ENCOUNTER (OUTPATIENT)
Dept: RADIATION ONCOLOGY | Facility: HOSPITAL | Age: 69
Setting detail: RADIATION/ONCOLOGY SERIES
Discharge: HOME | End: 2024-02-26
Payer: MEDICARE

## 2024-02-26 DIAGNOSIS — Z51.0 ENCOUNTER FOR ANTINEOPLASTIC RADIATION THERAPY: ICD-10-CM

## 2024-02-26 DIAGNOSIS — C50.812 MALIGNANT NEOPLASM OF OVERLAPPING SITES OF LEFT FEMALE BREAST (MULTI): ICD-10-CM

## 2024-02-26 LAB
RAD ONC MSQ ACTUAL FRACTIONS DELIVERED: 4
RAD ONC MSQ ACTUAL SESSION DELIVERED DOSE: 266 CGRAY
RAD ONC MSQ ACTUAL TOTAL DOSE: 1064 CGRAY
RAD ONC MSQ ELAPSED DAYS: 5
RAD ONC MSQ LAST DATE: NORMAL
RAD ONC MSQ PRESCRIBED FRACTIONAL DOSE: 266 CGRAY
RAD ONC MSQ PRESCRIBED NUMBER OF FRACTIONS: 16
RAD ONC MSQ PRESCRIBED TECHNIQUE: NORMAL
RAD ONC MSQ PRESCRIBED TOTAL DOSE: 4256 CGRAY
RAD ONC MSQ PRESCRIPTION PATTERN COMMENT: NORMAL
RAD ONC MSQ START DATE: NORMAL
RAD ONC MSQ TREATMENT COURSE NUMBER: 1
RAD ONC MSQ TREATMENT SITE: NORMAL

## 2024-02-26 PROCEDURE — 77385 HC INTENSITY-MODULATED RADIATION THERAPY (IMRT), SIMPLE: CPT | Performed by: RADIOLOGY

## 2024-02-27 ENCOUNTER — HOSPITAL ENCOUNTER (OUTPATIENT)
Dept: RADIATION ONCOLOGY | Facility: HOSPITAL | Age: 69
Setting detail: RADIATION/ONCOLOGY SERIES
Discharge: HOME | End: 2024-02-27
Payer: MEDICARE

## 2024-02-27 DIAGNOSIS — Z51.0 ENCOUNTER FOR ANTINEOPLASTIC RADIATION THERAPY: ICD-10-CM

## 2024-02-27 DIAGNOSIS — C50.812 MALIGNANT NEOPLASM OF OVERLAPPING SITES OF LEFT FEMALE BREAST (MULTI): ICD-10-CM

## 2024-02-27 LAB
RAD ONC MSQ ACTUAL FRACTIONS DELIVERED: 5
RAD ONC MSQ ACTUAL SESSION DELIVERED DOSE: 266 CGRAY
RAD ONC MSQ ACTUAL TOTAL DOSE: 1330 CGRAY
RAD ONC MSQ ELAPSED DAYS: 6
RAD ONC MSQ LAST DATE: NORMAL
RAD ONC MSQ PRESCRIBED FRACTIONAL DOSE: 266 CGRAY
RAD ONC MSQ PRESCRIBED NUMBER OF FRACTIONS: 16
RAD ONC MSQ PRESCRIBED TECHNIQUE: NORMAL
RAD ONC MSQ PRESCRIBED TOTAL DOSE: 4256 CGRAY
RAD ONC MSQ PRESCRIPTION PATTERN COMMENT: NORMAL
RAD ONC MSQ START DATE: NORMAL
RAD ONC MSQ TREATMENT COURSE NUMBER: 1
RAD ONC MSQ TREATMENT SITE: NORMAL

## 2024-02-27 PROCEDURE — 77385 HC INTENSITY-MODULATED RADIATION THERAPY (IMRT), SIMPLE: CPT | Performed by: RADIOLOGY

## 2024-02-28 ENCOUNTER — HOSPITAL ENCOUNTER (OUTPATIENT)
Dept: RADIATION ONCOLOGY | Facility: HOSPITAL | Age: 69
Setting detail: RADIATION/ONCOLOGY SERIES
Discharge: HOME | End: 2024-02-28
Payer: MEDICARE

## 2024-02-28 PROCEDURE — 77385 HC INTENSITY-MODULATED RADIATION THERAPY (IMRT), SIMPLE: CPT | Performed by: RADIOLOGY

## 2024-02-28 PROCEDURE — 77014 CHG CT GUIDANCE RADIATION THERAPY FLDS PLACEMENT: CPT | Performed by: STUDENT IN AN ORGANIZED HEALTH CARE EDUCATION/TRAINING PROGRAM

## 2024-02-28 PROCEDURE — 77336 RADIATION PHYSICS CONSULT: CPT | Performed by: RADIOLOGY

## 2024-02-29 ENCOUNTER — HOSPITAL ENCOUNTER (OUTPATIENT)
Dept: RADIATION ONCOLOGY | Facility: HOSPITAL | Age: 69
Setting detail: RADIATION/ONCOLOGY SERIES
Discharge: HOME | End: 2024-02-29
Payer: MEDICARE

## 2024-02-29 DIAGNOSIS — Z51.0 ENCOUNTER FOR ANTINEOPLASTIC RADIATION THERAPY: ICD-10-CM

## 2024-02-29 DIAGNOSIS — C50.812 MALIGNANT NEOPLASM OF OVERLAPPING SITES OF LEFT FEMALE BREAST (MULTI): ICD-10-CM

## 2024-02-29 LAB
RAD ONC MSQ ACTUAL FRACTIONS DELIVERED: 7
RAD ONC MSQ ACTUAL SESSION DELIVERED DOSE: 266 CGRAY
RAD ONC MSQ ACTUAL TOTAL DOSE: 1862 CGRAY
RAD ONC MSQ ELAPSED DAYS: 8
RAD ONC MSQ LAST DATE: NORMAL
RAD ONC MSQ PRESCRIBED FRACTIONAL DOSE: 266 CGRAY
RAD ONC MSQ PRESCRIBED NUMBER OF FRACTIONS: 16
RAD ONC MSQ PRESCRIBED TECHNIQUE: NORMAL
RAD ONC MSQ PRESCRIBED TOTAL DOSE: 4256 CGRAY
RAD ONC MSQ PRESCRIPTION PATTERN COMMENT: NORMAL
RAD ONC MSQ START DATE: NORMAL
RAD ONC MSQ TREATMENT COURSE NUMBER: 1
RAD ONC MSQ TREATMENT SITE: NORMAL

## 2024-02-29 PROCEDURE — 77014 CHG CT GUIDANCE RADIATION THERAPY FLDS PLACEMENT: CPT | Performed by: RADIOLOGY

## 2024-02-29 PROCEDURE — 77385 HC INTENSITY-MODULATED RADIATION THERAPY (IMRT), SIMPLE: CPT | Performed by: RADIOLOGY

## 2024-03-01 ENCOUNTER — HOSPITAL ENCOUNTER (OUTPATIENT)
Dept: RADIATION ONCOLOGY | Facility: HOSPITAL | Age: 69
Setting detail: RADIATION/ONCOLOGY SERIES
Discharge: HOME | End: 2024-03-01
Payer: MEDICARE

## 2024-03-01 DIAGNOSIS — C50.812 MALIGNANT NEOPLASM OF OVERLAPPING SITES OF LEFT FEMALE BREAST (MULTI): ICD-10-CM

## 2024-03-01 DIAGNOSIS — Z51.0 ENCOUNTER FOR ANTINEOPLASTIC RADIATION THERAPY: ICD-10-CM

## 2024-03-01 LAB
RAD ONC MSQ ACTUAL FRACTIONS DELIVERED: 8
RAD ONC MSQ ACTUAL SESSION DELIVERED DOSE: 266 CGRAY
RAD ONC MSQ ACTUAL TOTAL DOSE: 2128 CGRAY
RAD ONC MSQ ELAPSED DAYS: 9
RAD ONC MSQ LAST DATE: NORMAL
RAD ONC MSQ PRESCRIBED FRACTIONAL DOSE: 266 CGRAY
RAD ONC MSQ PRESCRIBED NUMBER OF FRACTIONS: 16
RAD ONC MSQ PRESCRIBED TECHNIQUE: NORMAL
RAD ONC MSQ PRESCRIBED TOTAL DOSE: 4256 CGRAY
RAD ONC MSQ PRESCRIPTION PATTERN COMMENT: NORMAL
RAD ONC MSQ START DATE: NORMAL
RAD ONC MSQ TREATMENT COURSE NUMBER: 1
RAD ONC MSQ TREATMENT SITE: NORMAL

## 2024-03-01 PROCEDURE — 77014 CHG CT GUIDANCE RADIATION THERAPY FLDS PLACEMENT: CPT | Performed by: RADIOLOGY

## 2024-03-01 PROCEDURE — 77385 HC INTENSITY-MODULATED RADIATION THERAPY (IMRT), SIMPLE: CPT | Performed by: RADIOLOGY

## 2024-03-04 ENCOUNTER — HOSPITAL ENCOUNTER (OUTPATIENT)
Dept: RADIATION ONCOLOGY | Facility: HOSPITAL | Age: 69
Setting detail: RADIATION/ONCOLOGY SERIES
Discharge: HOME | End: 2024-03-04
Payer: MEDICARE

## 2024-03-04 ENCOUNTER — RADIATION ONCOLOGY OTV (OUTPATIENT)
Dept: RADIATION ONCOLOGY | Facility: HOSPITAL | Age: 69
End: 2024-03-04
Payer: MEDICARE

## 2024-03-04 VITALS
HEIGHT: 62 IN | WEIGHT: 222 LBS | BODY MASS INDEX: 40.85 KG/M2 | DIASTOLIC BLOOD PRESSURE: 72 MMHG | SYSTOLIC BLOOD PRESSURE: 113 MMHG | RESPIRATION RATE: 18 BRPM | OXYGEN SATURATION: 97 % | HEART RATE: 85 BPM | TEMPERATURE: 97.2 F

## 2024-03-04 DIAGNOSIS — Z51.0 ENCOUNTER FOR ANTINEOPLASTIC RADIATION THERAPY: ICD-10-CM

## 2024-03-04 DIAGNOSIS — C50.812 MALIGNANT NEOPLASM OF OVERLAPPING SITES OF LEFT FEMALE BREAST (MULTI): ICD-10-CM

## 2024-03-04 LAB
RAD ONC MSQ ACTUAL FRACTIONS DELIVERED: 9
RAD ONC MSQ ACTUAL SESSION DELIVERED DOSE: 266 CGRAY
RAD ONC MSQ ACTUAL TOTAL DOSE: 2394 CGRAY
RAD ONC MSQ ELAPSED DAYS: 12
RAD ONC MSQ LAST DATE: NORMAL
RAD ONC MSQ PRESCRIBED FRACTIONAL DOSE: 266 CGRAY
RAD ONC MSQ PRESCRIBED NUMBER OF FRACTIONS: 16
RAD ONC MSQ PRESCRIBED TECHNIQUE: NORMAL
RAD ONC MSQ PRESCRIBED TOTAL DOSE: 4256 CGRAY
RAD ONC MSQ PRESCRIPTION PATTERN COMMENT: NORMAL
RAD ONC MSQ START DATE: NORMAL
RAD ONC MSQ TREATMENT COURSE NUMBER: 1
RAD ONC MSQ TREATMENT SITE: NORMAL

## 2024-03-04 PROCEDURE — 77427 RADIATION TX MANAGEMENT X5: CPT | Performed by: RADIOLOGY

## 2024-03-04 PROCEDURE — 77385 HC INTENSITY-MODULATED RADIATION THERAPY (IMRT), SIMPLE: CPT | Performed by: RADIOLOGY

## 2024-03-04 PROCEDURE — 77014 CHG CT GUIDANCE RADIATION THERAPY FLDS PLACEMENT: CPT | Performed by: RADIOLOGY

## 2024-03-04 NOTE — PROGRESS NOTES
"Radiation Oncology On Treatment Visit    Patient Name:  Betty Mendieta  MRN:  [id number]  :  1955    Referring Provider: No ref. provider found  Primary Care Provider: Dariana Laird PA-C  Care Team: Patient Care Team:  Dariana Laird PA-C as PCP - General (Family Medicine)  Dariana Laird PA-C as PCP - [employer] PCP  Rafal Ball MD as Consulting Physician (Hematology and Oncology)  Yaya Taylor MD PhD as Radiation Oncologist (Radiation Oncology)    Date of Service: 3/4/2024     Diagnosis:   Specialty Problems          Radiation Oncology Problems    Malignant neoplasm of lower-outer quadrant of left breast of female, estrogen receptor positive (CMS/HCC)         Treatment Summary:  Radiation Treatments       Active   BREAST_RNI_L (Started on 2024)   Most recent fraction: 266 cGy given on 3/4/2024   Total given: 2,394 cGy / 4,256 cGy  (9 of 16 fractions)   Elapsed Days: 12   Technique: VMAT           Completed   No historical radiation treatments to show.             SUBJECTIVE: Pt is in good spirits fx . Pt denies any issues other than what is denoted below.     OBJECTIVE:   Vital Signs:  /72   Pulse 85   Temp 36.2 °C (97.2 °F) (Temporal)   Resp 18   Ht 1.575 m (5' 2\")   Wt 101 kg (222 lb 0.1 oz)   SpO2 97%   BMI 40.60 kg/m²    Pain Scale: The patient's current pain level was assessed.  They report currently having a pain of 2 out of 10.    Other Pertinent Findings:     Toxicity Assessment          3/4/2024    14:18   Toxicity Assessment   Adverse Events Reviewed (WDL) Yes (Within Defined Limits)   Treatment Site Breast   Anorexia Grade 0   Anxiety Grade 0   Dehydration Grade 0   Depression Grade 0   Dermatitis Radiation Grade 0   Diarrhea Grade 0   Fatigue Grade 1       a little but no naps   Fibrosis Deep Connective Tissue Grade 0   Fracture Grade 0   Nausea Grade 0   Pain Grade 1       2/10   Treatment Related Secondary Malignancy Grade 0   Tumor Pain Grade 0 "   Vomiting Grade 0   Abdominal Pain Grade 0   Dysphagia Grade 0   Esophagitis Grade 0   Gastric Hemorrhage Grade 0   Mucositis Oral Grade 0   Dry Mouth Grade 0   Breast Infection Grade 0   Seroma Grade 0   Joint Range of Motion Decreased Grade 0   Joint Range of Motion Decreased Lumbar Spine Grade 0   Brachial Plexopathy Grade 0   Breast Atrophy Grade 0   Breast Pain Grade 1       2/10   Nipple Deformity Grade 0   Pneumonitis Grade 0   Edema Limbs Grade 0   Lymphedema Grade 0   Thromboembolic Event Grade 0   Hot Flashes Grade 0        Assessment / Plan:  The patient is tolerating radiation therapy as anticipated.  Continue per current treatment plan.      Yaya Taylor MD, PhD  Attending Physician

## 2024-03-05 ENCOUNTER — APPOINTMENT (OUTPATIENT)
Dept: PRIMARY CARE | Facility: CLINIC | Age: 69
End: 2024-03-05
Payer: MEDICARE

## 2024-03-05 ENCOUNTER — HOSPITAL ENCOUNTER (OUTPATIENT)
Dept: RADIATION ONCOLOGY | Facility: HOSPITAL | Age: 69
Setting detail: RADIATION/ONCOLOGY SERIES
Discharge: HOME | End: 2024-03-05
Payer: MEDICARE

## 2024-03-05 DIAGNOSIS — C50.812 MALIGNANT NEOPLASM OF OVERLAPPING SITES OF LEFT FEMALE BREAST (MULTI): ICD-10-CM

## 2024-03-05 DIAGNOSIS — Z51.0 ENCOUNTER FOR ANTINEOPLASTIC RADIATION THERAPY: ICD-10-CM

## 2024-03-05 LAB
RAD ONC MSQ ACTUAL FRACTIONS DELIVERED: 10
RAD ONC MSQ ACTUAL SESSION DELIVERED DOSE: 266 CGRAY
RAD ONC MSQ ACTUAL TOTAL DOSE: 2660 CGRAY
RAD ONC MSQ ELAPSED DAYS: 13
RAD ONC MSQ LAST DATE: NORMAL
RAD ONC MSQ PRESCRIBED FRACTIONAL DOSE: 266 CGRAY
RAD ONC MSQ PRESCRIBED NUMBER OF FRACTIONS: 16
RAD ONC MSQ PRESCRIBED TECHNIQUE: NORMAL
RAD ONC MSQ PRESCRIBED TOTAL DOSE: 4256 CGRAY
RAD ONC MSQ PRESCRIPTION PATTERN COMMENT: NORMAL
RAD ONC MSQ START DATE: NORMAL
RAD ONC MSQ TREATMENT COURSE NUMBER: 1
RAD ONC MSQ TREATMENT SITE: NORMAL

## 2024-03-05 PROCEDURE — 77014 CHG CT GUIDANCE RADIATION THERAPY FLDS PLACEMENT: CPT | Performed by: RADIOLOGY

## 2024-03-05 PROCEDURE — 77385 HC INTENSITY-MODULATED RADIATION THERAPY (IMRT), SIMPLE: CPT | Performed by: RADIOLOGY

## 2024-03-06 ENCOUNTER — HOSPITAL ENCOUNTER (OUTPATIENT)
Dept: RADIATION ONCOLOGY | Facility: HOSPITAL | Age: 69
Setting detail: RADIATION/ONCOLOGY SERIES
Discharge: HOME | End: 2024-03-06
Payer: MEDICARE

## 2024-03-06 DIAGNOSIS — C50.812 MALIGNANT NEOPLASM OF OVERLAPPING SITES OF LEFT FEMALE BREAST (MULTI): ICD-10-CM

## 2024-03-06 DIAGNOSIS — Z51.0 ENCOUNTER FOR ANTINEOPLASTIC RADIATION THERAPY: ICD-10-CM

## 2024-03-06 LAB
RAD ONC MSQ ACTUAL FRACTIONS DELIVERED: 11
RAD ONC MSQ ACTUAL SESSION DELIVERED DOSE: 266 CGRAY
RAD ONC MSQ ACTUAL TOTAL DOSE: 2926 CGRAY
RAD ONC MSQ ELAPSED DAYS: 14
RAD ONC MSQ LAST DATE: NORMAL
RAD ONC MSQ PRESCRIBED FRACTIONAL DOSE: 266 CGRAY
RAD ONC MSQ PRESCRIBED NUMBER OF FRACTIONS: 16
RAD ONC MSQ PRESCRIBED TECHNIQUE: NORMAL
RAD ONC MSQ PRESCRIBED TOTAL DOSE: 4256 CGRAY
RAD ONC MSQ PRESCRIPTION PATTERN COMMENT: NORMAL
RAD ONC MSQ START DATE: NORMAL
RAD ONC MSQ TREATMENT COURSE NUMBER: 1
RAD ONC MSQ TREATMENT SITE: NORMAL

## 2024-03-06 PROCEDURE — 77014 CHG CT GUIDANCE RADIATION THERAPY FLDS PLACEMENT: CPT | Performed by: STUDENT IN AN ORGANIZED HEALTH CARE EDUCATION/TRAINING PROGRAM

## 2024-03-06 PROCEDURE — 77385 HC INTENSITY-MODULATED RADIATION THERAPY (IMRT), SIMPLE: CPT | Performed by: RADIOLOGY

## 2024-03-06 PROCEDURE — 77336 RADIATION PHYSICS CONSULT: CPT | Performed by: RADIOLOGY

## 2024-03-07 ENCOUNTER — HOSPITAL ENCOUNTER (OUTPATIENT)
Dept: RADIATION ONCOLOGY | Facility: HOSPITAL | Age: 69
Setting detail: RADIATION/ONCOLOGY SERIES
Discharge: HOME | End: 2024-03-07
Payer: MEDICARE

## 2024-03-07 DIAGNOSIS — C50.812 MALIGNANT NEOPLASM OF OVERLAPPING SITES OF LEFT FEMALE BREAST (MULTI): ICD-10-CM

## 2024-03-07 DIAGNOSIS — Z51.0 ENCOUNTER FOR ANTINEOPLASTIC RADIATION THERAPY: ICD-10-CM

## 2024-03-07 LAB
RAD ONC MSQ ACTUAL FRACTIONS DELIVERED: 12
RAD ONC MSQ ACTUAL SESSION DELIVERED DOSE: 266 CGRAY
RAD ONC MSQ ACTUAL TOTAL DOSE: 3192 CGRAY
RAD ONC MSQ ELAPSED DAYS: 15
RAD ONC MSQ LAST DATE: NORMAL
RAD ONC MSQ PRESCRIBED FRACTIONAL DOSE: 266 CGRAY
RAD ONC MSQ PRESCRIBED NUMBER OF FRACTIONS: 16
RAD ONC MSQ PRESCRIBED TECHNIQUE: NORMAL
RAD ONC MSQ PRESCRIBED TOTAL DOSE: 4256 CGRAY
RAD ONC MSQ PRESCRIPTION PATTERN COMMENT: NORMAL
RAD ONC MSQ START DATE: NORMAL
RAD ONC MSQ TREATMENT COURSE NUMBER: 1
RAD ONC MSQ TREATMENT SITE: NORMAL

## 2024-03-07 PROCEDURE — 77014 CHG CT GUIDANCE RADIATION THERAPY FLDS PLACEMENT: CPT | Performed by: RADIOLOGY

## 2024-03-07 PROCEDURE — 77385 HC INTENSITY-MODULATED RADIATION THERAPY (IMRT), SIMPLE: CPT | Performed by: RADIOLOGY

## 2024-03-08 ENCOUNTER — HOSPITAL ENCOUNTER (OUTPATIENT)
Dept: RADIATION ONCOLOGY | Facility: HOSPITAL | Age: 69
Setting detail: RADIATION/ONCOLOGY SERIES
Discharge: HOME | End: 2024-03-08
Payer: MEDICARE

## 2024-03-08 DIAGNOSIS — C50.812 MALIGNANT NEOPLASM OF OVERLAPPING SITES OF LEFT FEMALE BREAST (MULTI): ICD-10-CM

## 2024-03-08 DIAGNOSIS — Z51.0 ENCOUNTER FOR ANTINEOPLASTIC RADIATION THERAPY: ICD-10-CM

## 2024-03-08 LAB
RAD ONC MSQ ACTUAL FRACTIONS DELIVERED: 13
RAD ONC MSQ ACTUAL SESSION DELIVERED DOSE: 266 CGRAY
RAD ONC MSQ ACTUAL TOTAL DOSE: 3458 CGRAY
RAD ONC MSQ ELAPSED DAYS: 16
RAD ONC MSQ LAST DATE: NORMAL
RAD ONC MSQ PRESCRIBED FRACTIONAL DOSE: 266 CGRAY
RAD ONC MSQ PRESCRIBED NUMBER OF FRACTIONS: 16
RAD ONC MSQ PRESCRIBED TECHNIQUE: NORMAL
RAD ONC MSQ PRESCRIBED TOTAL DOSE: 4256 CGRAY
RAD ONC MSQ PRESCRIPTION PATTERN COMMENT: NORMAL
RAD ONC MSQ START DATE: NORMAL
RAD ONC MSQ TREATMENT COURSE NUMBER: 1
RAD ONC MSQ TREATMENT SITE: NORMAL

## 2024-03-08 PROCEDURE — 77014 CHG CT GUIDANCE RADIATION THERAPY FLDS PLACEMENT: CPT | Performed by: RADIOLOGY

## 2024-03-08 PROCEDURE — 77385 HC INTENSITY-MODULATED RADIATION THERAPY (IMRT), SIMPLE: CPT | Performed by: RADIOLOGY

## 2024-03-11 ENCOUNTER — HOSPITAL ENCOUNTER (OUTPATIENT)
Dept: RADIATION ONCOLOGY | Facility: HOSPITAL | Age: 69
Setting detail: RADIATION/ONCOLOGY SERIES
Discharge: HOME | End: 2024-03-11
Payer: MEDICARE

## 2024-03-11 ENCOUNTER — RADIATION ONCOLOGY OTV (OUTPATIENT)
Dept: RADIATION ONCOLOGY | Facility: HOSPITAL | Age: 69
End: 2024-03-11
Payer: MEDICARE

## 2024-03-11 DIAGNOSIS — Z51.0 ENCOUNTER FOR ANTINEOPLASTIC RADIATION THERAPY: ICD-10-CM

## 2024-03-11 DIAGNOSIS — C50.812 MALIGNANT NEOPLASM OF OVERLAPPING SITES OF LEFT FEMALE BREAST (MULTI): ICD-10-CM

## 2024-03-11 LAB
RAD ONC MSQ ACTUAL FRACTIONS DELIVERED: 14
RAD ONC MSQ ACTUAL SESSION DELIVERED DOSE: 266 CGRAY
RAD ONC MSQ ACTUAL TOTAL DOSE: 3724 CGRAY
RAD ONC MSQ ELAPSED DAYS: 19
RAD ONC MSQ LAST DATE: NORMAL
RAD ONC MSQ PRESCRIBED FRACTIONAL DOSE: 266 CGRAY
RAD ONC MSQ PRESCRIBED NUMBER OF FRACTIONS: 16
RAD ONC MSQ PRESCRIBED TECHNIQUE: NORMAL
RAD ONC MSQ PRESCRIBED TOTAL DOSE: 4256 CGRAY
RAD ONC MSQ PRESCRIPTION PATTERN COMMENT: NORMAL
RAD ONC MSQ START DATE: NORMAL
RAD ONC MSQ TREATMENT COURSE NUMBER: 1
RAD ONC MSQ TREATMENT SITE: NORMAL

## 2024-03-11 PROCEDURE — 77427 RADIATION TX MANAGEMENT X5: CPT | Performed by: RADIOLOGY

## 2024-03-11 PROCEDURE — 77385 HC INTENSITY-MODULATED RADIATION THERAPY (IMRT), SIMPLE: CPT | Performed by: RADIOLOGY

## 2024-03-11 PROCEDURE — 77014 CHG CT GUIDANCE RADIATION THERAPY FLDS PLACEMENT: CPT | Performed by: RADIOLOGY

## 2024-03-11 NOTE — PROGRESS NOTES
Radiation Oncology On Treatment Visit    Patient Name:  Betty Mendieta  MRN:  [id number]  :  1955    Referring Provider: No ref. provider found  Primary Care Provider: TUKCER Braun  Care Team: Patient Care Team:  TUCKER Braun as PCP - General (Family Medicine)  TUCKER Braun as PCP - [employer] PCP  Rafal Ball MD as Consulting Physician (Hematology and Oncology)  Yaya Taylor MD PhD as Radiation Oncologist (Radiation Oncology)    Date of Service: 3/11/2024     Diagnosis:   Specialty Problems          Radiation Oncology Problems    Malignant neoplasm of lower-outer quadrant of left breast of female, estrogen receptor positive (CMS/HCC)         Treatment Summary:  Radiation Treatments       Active   BREAST_RNI_L (Started on 2024)   Most recent fraction: 266 cGy given on 3/8/2024   Total given: 3,458 cGy / 4,256 cGy  (13 of 16 fractions)   Elapsed Days: 16   Technique: VMAT           Completed   No historical radiation treatments to show.             SUBJECTIVE: Pt is in good spirits and denies any issues other than what is denoted below  Energy level unchanged, no pain, no skin changes, no itchiness. Doing well.    OBJECTIVE:   Vital Signs:  There were no vitals taken for this visit.   Pain Scale: The patient's current pain level was assessed.  They report currently having a pain of 0 out of 10.    Other Pertinent Findings:     Toxicity Assessment          3/4/2024    14:18 3/11/2024    13:23   Toxicity Assessment   Adverse Events Reviewed (WDL) Yes (Within Defined Limits) Yes (Within Defined Limits)   Treatment Site Breast Breast   Anorexia Grade 0 Grade 0   Anxiety Grade 0 Grade 0   Dehydration Grade 0 Grade 0   Depression Grade 0 Grade 0   Dermatitis Radiation Grade 0 Grade 0   Diarrhea Grade 0 Grade 0   Fatigue Grade 1       a little but no naps Grade 1       pt states a smidge   Fibrosis Deep Connective Tissue Grade 0 Grade 0   Fracture Grade 0 Grade 0   Nausea  Grade 0 Grade 0   Pain Grade 1       2/10 Grade 0   Treatment Related Secondary Malignancy Grade 0 Grade 0   Tumor Pain Grade 0 Grade 0   Vomiting Grade 0 Grade 0   Abdominal Pain Grade 0    Dysphagia Grade 0    Esophagitis Grade 0    Gastric Hemorrhage Grade 0    Mucositis Oral Grade 0    Dry Mouth Grade 0 Grade 0   Breast Infection Grade 0 Grade 0   Seroma Grade 0 Grade 0   Joint Range of Motion Decreased Grade 0 Grade 0   Joint Range of Motion Decreased Lumbar Spine Grade 0 Grade 0   Brachial Plexopathy Grade 0 Grade 0   Breast Atrophy Grade 0 Grade 0   Breast Pain Grade 1       2/10 Grade 0   Nipple Deformity Grade 0 Grade 0   Pneumonitis Grade 0 Grade 0   Edema Limbs Grade 0 Grade 0   Lymphedema Grade 0 Grade 0   Thromboembolic Event Grade 0 Grade 0   Hot Flashes Grade 0 Grade 0        Assessment / Plan:  The patient is tolerating radiation therapy as anticipated.  Continue per current treatment plan.   Follow up in one month, continue RT.    Yaya Taylor MD, PhD  Attending Physician

## 2024-03-12 ENCOUNTER — HOSPITAL ENCOUNTER (OUTPATIENT)
Dept: RADIATION ONCOLOGY | Facility: HOSPITAL | Age: 69
Setting detail: RADIATION/ONCOLOGY SERIES
Discharge: HOME | End: 2024-03-12
Payer: MEDICARE

## 2024-03-12 DIAGNOSIS — C50.812 MALIGNANT NEOPLASM OF OVERLAPPING SITES OF LEFT FEMALE BREAST (MULTI): ICD-10-CM

## 2024-03-12 DIAGNOSIS — Z51.0 ENCOUNTER FOR ANTINEOPLASTIC RADIATION THERAPY: ICD-10-CM

## 2024-03-12 LAB
RAD ONC MSQ ACTUAL FRACTIONS DELIVERED: 15
RAD ONC MSQ ACTUAL SESSION DELIVERED DOSE: 266 CGRAY
RAD ONC MSQ ACTUAL TOTAL DOSE: 3990 CGRAY
RAD ONC MSQ ELAPSED DAYS: 20
RAD ONC MSQ LAST DATE: NORMAL
RAD ONC MSQ PRESCRIBED FRACTIONAL DOSE: 266 CGRAY
RAD ONC MSQ PRESCRIBED NUMBER OF FRACTIONS: 16
RAD ONC MSQ PRESCRIBED TECHNIQUE: NORMAL
RAD ONC MSQ PRESCRIBED TOTAL DOSE: 4256 CGRAY
RAD ONC MSQ PRESCRIPTION PATTERN COMMENT: NORMAL
RAD ONC MSQ START DATE: NORMAL
RAD ONC MSQ TREATMENT COURSE NUMBER: 1
RAD ONC MSQ TREATMENT SITE: NORMAL

## 2024-03-12 PROCEDURE — 77385 HC INTENSITY-MODULATED RADIATION THERAPY (IMRT), SIMPLE: CPT | Performed by: RADIOLOGY

## 2024-03-12 PROCEDURE — 77014 CHG CT GUIDANCE RADIATION THERAPY FLDS PLACEMENT: CPT | Performed by: RADIOLOGY

## 2024-03-13 ENCOUNTER — EDUCATION (OUTPATIENT)
Dept: HEMATOLOGY/ONCOLOGY | Facility: HOSPITAL | Age: 69
End: 2024-03-13
Payer: MEDICARE

## 2024-03-13 ENCOUNTER — DOCUMENTATION (OUTPATIENT)
Dept: RADIATION ONCOLOGY | Facility: HOSPITAL | Age: 69
End: 2024-03-13

## 2024-03-13 ENCOUNTER — HOSPITAL ENCOUNTER (OUTPATIENT)
Dept: RADIATION ONCOLOGY | Facility: HOSPITAL | Age: 69
Setting detail: RADIATION/ONCOLOGY SERIES
Discharge: HOME | End: 2024-03-13
Payer: MEDICARE

## 2024-03-13 DIAGNOSIS — C50.812 MALIGNANT NEOPLASM OF OVERLAPPING SITES OF LEFT FEMALE BREAST (MULTI): ICD-10-CM

## 2024-03-13 DIAGNOSIS — Z51.0 ENCOUNTER FOR ANTINEOPLASTIC RADIATION THERAPY: ICD-10-CM

## 2024-03-13 LAB
RAD ONC MSQ ACTUAL FRACTIONS DELIVERED: 16
RAD ONC MSQ ACTUAL SESSION DELIVERED DOSE: 266 CGRAY
RAD ONC MSQ ACTUAL TOTAL DOSE: 4256 CGRAY
RAD ONC MSQ ELAPSED DAYS: 21
RAD ONC MSQ LAST DATE: NORMAL
RAD ONC MSQ PRESCRIBED FRACTIONAL DOSE: 266 CGRAY
RAD ONC MSQ PRESCRIBED NUMBER OF FRACTIONS: 16
RAD ONC MSQ PRESCRIBED TECHNIQUE: NORMAL
RAD ONC MSQ PRESCRIBED TOTAL DOSE: 4256 CGRAY
RAD ONC MSQ PRESCRIPTION PATTERN COMMENT: NORMAL
RAD ONC MSQ START DATE: NORMAL
RAD ONC MSQ TREATMENT COURSE NUMBER: 1
RAD ONC MSQ TREATMENT SITE: NORMAL

## 2024-03-13 PROCEDURE — 77385 HC INTENSITY-MODULATED RADIATION THERAPY (IMRT), SIMPLE: CPT | Performed by: RADIOLOGY

## 2024-03-13 PROCEDURE — 77014 CHG CT GUIDANCE RADIATION THERAPY FLDS PLACEMENT: CPT | Performed by: RADIOLOGY

## 2024-03-13 NOTE — PROGRESS NOTES
Research Note Treatment Day    Betty Mendieta is here today for treatment on [id number]. Today is END OF RADIATION THERAPY. Procedures completed per protocol. AE's and con-meds reviewed with patient. Patient is aware of treatment plan.    [x]   Received treatment as planned   OR  []    Treatment delayed; patient calendar updated as required   Treatment delayed because:    []   AE    []   Physician Discretion    []   Clinical Deterioration or Progression     []   Other    Education Documentation  No documentation found.  Education Comments  No comments found.    Education provided regarding follow up appointment for 08/05/2024 at 09:00am.  Patient aware that reminder letter will be sent to her. She has my number if she has any questions. Patient was able to repeat date and time of appointment. Weight today 99.4kg

## 2024-03-15 NOTE — PROGRESS NOTES
RADIATION COMPLETION OF THERAPY NOTE    Patient Name:  Betty Mendieta  MRN:  [id number]  :  1955    Radiation Oncologist: Yaya Taylor MD PhD   Referring Provider: No ref. provider found  Primary Care Provider: Rafal Ball MD    Brief History: Betty Mendieta is a 68 y.o. female with Malignant neoplasm of lower-outer quadrant of left breast of female, estrogen receptor positive (CMS/HCC), Pathologic: Stage IA (pT1c, pN1a(sn), cM0, G1, ER+, [place]+, HER2-).  The patient completed radiotherapy as outlined below.    Radiation Treatment Summary:    Radiation Oncology   Radiation Treatments       Active   No active radiation treatments to show.     Completed   BREAST_RNI_L (Started on 2024)   Most recent fraction: 266 cGy given on 3/13/2024   Total given: 4,256 cGy / 4,256 cGy  (16 of 16 fractions)   Elapsed Days: 21   Technique: VMAT                       Concurrent Chemotherapy:  [No matching plan found]    CTCAE Toxicity Overview:   Toxicity Assessment          3/4/2024    14:18 3/11/2024    13:23   Toxicity Assessment   Adverse Events Reviewed (WDL) Yes (Within Defined Limits) Yes (Within Defined Limits)   Treatment Site Breast Breast   Anorexia Grade 0 Grade 0   Anxiety Grade 0 Grade 0   Dehydration Grade 0 Grade 0   Depression Grade 0 Grade 0   Dermatitis Radiation Grade 0 Grade 0   Diarrhea Grade 0 Grade 0   Fatigue Grade 1       a little but no naps Grade 1       pt states a smidge   Fibrosis Deep Connective Tissue Grade 0 Grade 0   Fracture Grade 0 Grade 0   Nausea Grade 0 Grade 0   Pain Grade 1       2/10 Grade 0   Treatment Related Secondary Malignancy Grade 0 Grade 0   Tumor Pain Grade 0 Grade 0   Vomiting Grade 0 Grade 0   Abdominal Pain Grade 0    Dysphagia Grade 0    Esophagitis Grade 0    Gastric Hemorrhage Grade 0    Mucositis Oral Grade 0    Dry Mouth Grade 0 Grade 0   Breast Infection Grade 0 Grade 0   Seroma Grade 0 Grade 0   Joint Range of Motion Decreased Grade 0 Grade 0   Joint Range  of Motion Decreased Lumbar Spine Grade 0 Grade 0   Brachial Plexopathy Grade 0 Grade 0   Breast Atrophy Grade 0 Grade 0   Breast Pain Grade 1       2/10 Grade 0   Nipple Deformity Grade 0 Grade 0   Pneumonitis Grade 0 Grade 0   Edema Limbs Grade 0 Grade 0   Lymphedema Grade 0 Grade 0   Thromboembolic Event Grade 0 Grade 0   Hot Flashes Grade 0 Grade 0     Patient Disposition:    Future Appointments       Date / Time Provider Department Dept Phone    4/2/2024 10:45 AM Roscoe Jorge OD [facility] [phone]    4/16/2024 9:20 AM (Arrive by 9:05 AM) Amol Johnson MD [facility] [phone]    8/5/2024 9:00 AM Yaya Taylor MD PhD [facility] [phone]

## 2024-03-19 DIAGNOSIS — I10 PRIMARY HYPERTENSION: ICD-10-CM

## 2024-03-20 DIAGNOSIS — C50.512 MALIGNANT NEOPLASM OF LOWER-OUTER QUADRANT OF LEFT BREAST OF FEMALE, ESTROGEN RECEPTOR POSITIVE (MULTI): Primary | ICD-10-CM

## 2024-03-20 DIAGNOSIS — Z17.0 MALIGNANT NEOPLASM OF LOWER-OUTER QUADRANT OF LEFT BREAST OF FEMALE, ESTROGEN RECEPTOR POSITIVE (MULTI): Primary | ICD-10-CM

## 2024-03-20 RX ORDER — AMLODIPINE BESYLATE 5 MG/1
5 TABLET ORAL
Qty: 30 TABLET | Refills: 0 | Status: SHIPPED | OUTPATIENT
Start: 2024-03-20

## 2024-04-02 ENCOUNTER — OFFICE VISIT (OUTPATIENT)
Dept: OPHTHALMOLOGY | Facility: CLINIC | Age: 69
End: 2024-04-02
Payer: MEDICARE

## 2024-04-02 DIAGNOSIS — H52.4 BILATERAL PRESBYOPIA: ICD-10-CM

## 2024-04-02 DIAGNOSIS — H52.03 HYPEROPIA OF BOTH EYES: ICD-10-CM

## 2024-04-02 DIAGNOSIS — H40.1131 PRIMARY OPEN ANGLE GLAUCOMA (POAG) OF BOTH EYES, MILD STAGE: Primary | ICD-10-CM

## 2024-04-02 DIAGNOSIS — H40.059 OCULAR HYPERTENSION, UNSPECIFIED LATERALITY: ICD-10-CM

## 2024-04-02 DIAGNOSIS — H25.13 AGE-RELATED NUCLEAR CATARACT OF BOTH EYES: ICD-10-CM

## 2024-04-02 DIAGNOSIS — H40.053 BILATERAL OCULAR HYPERTENSION: ICD-10-CM

## 2024-04-02 PROCEDURE — 92014 COMPRE OPH EXAM EST PT 1/>: CPT | Performed by: STUDENT IN AN ORGANIZED HEALTH CARE EDUCATION/TRAINING PROGRAM

## 2024-04-02 PROCEDURE — 92083 EXTENDED VISUAL FIELD XM: CPT | Performed by: STUDENT IN AN ORGANIZED HEALTH CARE EDUCATION/TRAINING PROGRAM

## 2024-04-02 PROCEDURE — 92133 CPTRZD OPH DX IMG PST SGM ON: CPT | Performed by: STUDENT IN AN ORGANIZED HEALTH CARE EDUCATION/TRAINING PROGRAM

## 2024-04-02 RX ORDER — LATANOPROST 50 UG/ML
1 SOLUTION/ DROPS OPHTHALMIC DAILY
Qty: 7.5 ML | Refills: 4 | Status: SHIPPED | OUTPATIENT
Start: 2024-04-02 | End: 2024-07-01

## 2024-04-02 ASSESSMENT — ENCOUNTER SYMPTOMS
PSYCHIATRIC NEGATIVE: 0
ALLERGIC/IMMUNOLOGIC NEGATIVE: 0
GASTROINTESTINAL NEGATIVE: 0
HEMATOLOGIC/LYMPHATIC NEGATIVE: 0
NEUROLOGICAL NEGATIVE: 0
RESPIRATORY NEGATIVE: 0
EYES NEGATIVE: 0
ENDOCRINE NEGATIVE: 0
MUSCULOSKELETAL NEGATIVE: 0
CONSTITUTIONAL NEGATIVE: 0
CARDIOVASCULAR NEGATIVE: 0

## 2024-04-02 ASSESSMENT — CONF VISUAL FIELD
OS_NORMAL: 1
OD_SUPERIOR_NASAL_RESTRICTION: 0
OS_SUPERIOR_TEMPORAL_RESTRICTION: 0
OD_INFERIOR_TEMPORAL_RESTRICTION: 0
OD_NORMAL: 1
OD_SUPERIOR_TEMPORAL_RESTRICTION: 0
METHOD: COUNTING FINGERS
OS_INFERIOR_TEMPORAL_RESTRICTION: 0
OS_SUPERIOR_NASAL_RESTRICTION: 0
OD_INFERIOR_NASAL_RESTRICTION: 0
OS_INFERIOR_NASAL_RESTRICTION: 0

## 2024-04-02 ASSESSMENT — CUP TO DISC RATIO
OD_RATIO: 0.8
OS_RATIO: 0.8

## 2024-04-02 ASSESSMENT — VISUAL ACUITY
OS_SC+: +1
METHOD: SNELLEN - LINEAR
OD_SC+: -2
OD_SC: 20/20
OS_SC: 20/30

## 2024-04-02 ASSESSMENT — REFRACTION_WEARINGRX
OS_AXIS: 005
OD_AXIS: 070
SPECS_TYPE: LAST MRX
OD_SPHERE: -0.25
OD_CYLINDER: +0.50
OS_SPHERE: -0.25
OS_CYLINDER: +0.75

## 2024-04-02 ASSESSMENT — REFRACTION_MANIFEST
OS_ADD: +2.50
OS_CYLINDER: -0.50
OD_SPHERE: +0.25
OS_SPHERE: +0.25
OD_AXIS: 150
OD_ADD: +2.50
OS_AXIS: 085
OD_CYLINDER: -0.50

## 2024-04-02 ASSESSMENT — PACHYMETRY
OD_CT(UM): 536
OS_CT(UM): 518

## 2024-04-02 ASSESSMENT — TONOMETRY
OS_IOP_MMHG: 18
OD_IOP_MMHG: 17
IOP_METHOD: GOLDMANN APPLANATION

## 2024-04-02 ASSESSMENT — EXTERNAL EXAM - LEFT EYE: OS_EXAM: NORMAL

## 2024-04-02 ASSESSMENT — EXTERNAL EXAM - RIGHT EYE: OD_EXAM: NORMAL

## 2024-04-02 NOTE — PROGRESS NOTES
Assessment/Plan   Diagnoses and all orders for this visit:  Primary open angle glaucoma (POAG) of both eyes, mild stage  Bilateral ocular hypertension  -patient presents taking latanoprost QHS from previous provider  -IOP today 17/18 c TMAX 22/20 c PACHS thinner 536/518  -ONH appearance with enlarged C/D; stable  -OCT RNFL today 4/2/24 stable superior quad thinning to 2018; inf quad stable and wnl  -HVF 24-2 today 4/2/24 OD: new SN step defects OS: new dense sup arcuate  -HVF results do not match clinical picture of stable new appearance, stable IOP, and thinning on the superior RNFL quadrant not inferior. There is also no inf quadrant progression dating back to 2018.  -Discussed findings with patient-would like to retest to see if results are repeatable  -RTC for repeat HVF 24-2 OU  -Continue Latanoprost Qday OU with Tgoal high teens    Hyperopia of both eyes  Bilateral presbyopia  Age-related nuclear cataract of both eyes  -prefers NVO. Cataracts are mild visual significance not affecting ADL`s    RTC 6 months for repeat IOP check and HVF 24-2

## 2024-04-15 ENCOUNTER — APPOINTMENT (OUTPATIENT)
Dept: PRIMARY CARE | Facility: CLINIC | Age: 69
End: 2024-04-15
Payer: MEDICARE

## 2024-05-02 PROBLEM — Z79.811 AROMATASE INHIBITOR USE: Status: ACTIVE | Noted: 2024-05-02

## 2024-05-02 PROBLEM — Z08 ENCOUNTER FOR FOLLOW-UP SURVEILLANCE OF BREAST CANCER: Status: ACTIVE | Noted: 2024-05-02

## 2024-05-02 PROBLEM — Z85.3 ENCOUNTER FOR FOLLOW-UP SURVEILLANCE OF BREAST CANCER: Status: ACTIVE | Noted: 2024-05-02

## 2024-05-02 PROBLEM — M85.80 OSTEOPENIA: Status: ACTIVE | Noted: 2024-05-02

## 2024-05-02 NOTE — PROGRESS NOTES
Breast Medical Oncology Clinic  Location: [facility]      BREAST CANCER DIAGNOSIS and HISTORY  Malignant neoplasm of lower-outer quadrant of left breast of female, estrogen receptor positive (Multi), Pathologic: Stage IA (pT1c, pN1a(sn), cM0, G1, ER+, NJ+, HER2-)     Patient has a history of benign breast biopsy on the right.     November 26, 2021: Bilateral screening mammogram showed scattered fibroglandular tissue with a left focal asymmetry, BI-RADS 0.     January 20, 2022: Mammogram and ultrasound of the left breast showed the asymmetry persisted.  It was 6 cm from the nipple and measured 5 x 3 x 5 mm BI-RADS Category 3.  Recommendation was for repeat mammogram and ultrasound in 6 months.     September 26, 2023: Patient presented with right nipple discharge and a bilateral mammogram showed scattered fibroglandular tissue with a left breast mass showing an interval change and associated distortion BI-RADS Category 0.  An ultrasound was recommended.     October 6 2023: Ultrasound of the right subareolar area showed no suspicious abnormality.  Ultrasound of the left mass at the 6 o'clock position 5 cm from the nipple showed a 0.8 x 0.6 x 0.8 cm irregular mass with no suspicious left axillary lymph nodes.     November 1, 2023: Biopsy of the left breast mass showed grade 1 invasive ductal carcinoma, ER/NJ 95% positive, HER2/thomas negative and a biopsy marker was placed at the time of the biopsy.     December 13, 2023: Patient underwent a left lumpectomy and sentinel lymph node biopsy with Dr. Jocelin Luis, final pathology showed a 1.1 cm grade 1 invasive ductal carcinoma, margins negative, 1 out of 1 lymph nodes positive with 2.8 mm of disease with microscopic KING, pT1 cN1a.  Specimen submitted for Oncotype      January 15, 2024: Patient was seen by Dr. Ball in medical oncology. Recommended 5-7 years anastrozole + Adjuvant bisphosphonate therapy. Oncotype 13     March 13 2024 Completion of Radiation therapy with CCGT  1119      HISTORY OF PRESENT ILLNESS    Betty Mendieta is a 68 y.o. woman s/p lumpectomy on 23 with left sided 1.1 IDC and 11 SLN, I.e. [id number] ER/UT>95% HER2 neg. Oncotype DX 13. She has been initiated on CCTG 1119.     She denies any new breast cancer concerns. She feels healed up from Radiation therapy- no more skin burns. She denies any new lumps or masses. She is using cream 2 times per day. She denies any skin lesions or masses skin changes or sores.     She denies any chest pain or breathing issues, no sob or cough.      She denies any vision changes or headache issues, dizziness, loss of balance or falls.     She denies any new or unexplained bone aches or pains.    She reports a normal appetite and normal bowel movements, normal urination. She is not sexually active     She denies any issues with sleep, fatigue, hot flashes or mood swings.       Review of Systems   ROS 14 points performed, See HPI for exceptions          Past Medical History:  has a past medical history of Asthma ([facility]), Breast cancer, left breast (Multi), Cataract, Diverticulosis, Essential (primary) hypertension (10/26/2018), GERD (gastroesophageal reflux disease), Ocular hypertension, unspecified eye (2018), Preglaucoma, unspecified, unspecified eye, Radiculopathy, lumbar region (2016), Unilateral primary osteoarthritis, left knee (2021), and Vitamin D deficiency.  Surgical History:   has a past surgical history that includes  section, classic; Tubal ligation; Breast biopsy (Right); Breast biopsy (Left, 2023); Total knee arthroplasty (Left); Total knee arthroplasty (Right); BI US guided breast localization left (Left, 2023); and Breast lumpectomy.  Social History:   reports that she has never smoked. She has never used smokeless tobacco. She reports that she does not drink alcohol and does not use drugs.  She is a retired  in . She lives with her grandson Zac (is 22).  "She is  since .     GYN:  2 adult children in [place]   Family History:    Family History   Problem Relation Name Age of Onset    Hypertension Mother      Asthma Father      Hypertension Father      Glaucoma Sister      Hypertension Sister      Stroke Brother       Family Oncology History:  Cancer-related family history is not on file.      OBJECTIVE    VS / Pain:  /76   Pulse 74   Temp 36.2 °C (97.2 °F) (Temporal)   Resp 20   Ht 1.575 m (5' 2.01\")   Wt 100 kg (220 lb 14.4 oz)   SpO2 96%   BMI 40.39 kg/m²   BSA: 2.09 meters squared   Pain Scale: 0    Performance Status:  The ECOG performance scale today is ECO- Fully active, able to carry on all pre-disease performance w/o restriction.      Physical Exam   Constitutional: Well developed, awake/alert/oriented x4, no distress, alert and cooperative  EYES: Sclera clear  ENMT: mucous membranes moist, no apparent injury, no lesions seen  Head/Neck: Neck supple, no apparent injury, thyroid without mass or tenderness, No JVD, trachea midline, no bruits  Respiratory / Thoracic: Patent airways, clear to all lobes, normal breath sounds with good chest expansion, thorax symmetric.  Cardiovascular: Regular, rate and rhythm, no murmurs, 2+ equal pulses of the extremities, normal auscultated S 1and S 2  GI: Nondistended, soft, non-tender, no rebound tenderness or guarding, no masses palpable, no organomegaly, +BS, no bruits  Musculoskeletal: ROM intact, no joint swelling, normal strength, no spinal tenderness  Extremities: normal extremities, no cyanosis edema, contusions or wounds, no clubbing  Neurological: alert and oriented x4, intact senses, motor, response and reflexes, normal strength  Breast: s/p left lumpectomy  and radiation therapy. + skin thickening and hyperpigmentation due to radiation therapy. No palpable masses or lesions in either breast.   Lymphatic: No cervical, supraclavicular, infraclavicular or axillary " lymphadenopathy  Psychological: Appropriate and talkative mood and behavior  Skin: Warm and dry, no lesions, no rashes, no jaundice      Diagnostic Results        Study Result  Interpreted By: KALEB SIM MD  MRN: [id number]  Patient Name: CONNOR SMILEY    STUDY:  BONE DENSITY, DEXA 1 OR MORE SITES: AXIAL SKELETN9/25/2023 10:05 am    INDICATION:  screening. The patient is a 67 y/o year old F.    COMPARISON:  No comparison available.    ACCESSION NUMBER(S):  [id number]    ORDERING CLINICIAN:  RENZO BRYSON    TECHNIQUE:  BONE DENSITY, DEXA 1 OR MORE SITES: AXIAL SKELETN    FINDINGS:  SPINE L1-L4  Bone Mineral Density: 1.042  T-Score 0.0 Z-Score 1  Classification: Normal  Bone Mineral Density change vs baseline: Not reported  Bone Mineral Density change vs previous: Not reported    LEFT FEMUR -TOTAL  Bone Mineral Density: 0.807  T-Score -1.1 Z-Score -0.4  Classification: Osteopenia  Bone Mineral Density change vs baseline: Not reported  Bone Mineral Density change vs previous: Not reported    LEFT FEMUR -NECK  Bone Mineral Density: 0.648  T-Score -1.8 Z-Score -0.8  Classification: Osteopenia      World Health Organization (WHO) criteria for post-menopausal,   Women:  Normal: T-score at or above -1 SD  Osteopenia: T-score between -1 and -2.5 SD  Osteoporosis: T-score at or below -2.5 SD      10-year Fracture Risk:  Major Osteoporotic Fracture 4.1  Hip Fracture 0.5    Note: If no FRAX score is reported, it is because:  Some T-score for Spine Total or Hip Total or Femoral Neck at or below  -2.5    This exam was performed at [facility] on a [place] [facility] Dexa Unit.      IMPRESSION:  DEXA: According to World Health Organization criteria,  classification is  low bone mass (osteopenia)    Followup recommended in two years or sooner as clinically warranted.    All images and detailed analysis are available on the  Radiology  PACS.    MACRO:  None       BI US breast limited bilateral  10/16/2023    Narrative  Interpreted By:  Jay Linder,  STUDY:  BI US BREAST LIMITED BILATERAL;  10/16/2023 11:12 am    ACCESSION NUMBER(S):  [id number]    ORDERING CLINICIAN:  RENZO BRYSON    INDICATION:  Callback from screening. Nipple discharge.    COMPARISON:  Mammogram dated 09/26/2023    FINDINGS:  The patient was recalled for a mass in the left breast. Since her  screening mammogram, she has developed right nipple discharge.    Sonographic images were obtained of the right breast in the  subareolar region to evaluate the nipple discharge. No suspicious  sonographic abnormality is identified. There is a dilated duct which  demonstrates no internal echoes or intraductal masses. Color Doppler  imaging demonstrates normal vascularity. Elastography shows soft  tissue.    The left breast was evaluated. At the 6 o'clock position 5 cm from  the nipple the correlate for the mammographic mass is identified. It  is a irregular ill-defined hypoechoic mass with internal vascularity  which demonstrates areas of increased stiffness on elastography. The  mass measures 0.8 x 0.6 x 0.8 cm.    The left axilla demonstrates only normal appearing lymph nodes.    Impression  Mass for which the patient was recalled demonstrates both suspicious  features mammographically and sonographically. An ultrasound-guided  biopsy is recommended.    No suspicious findings seen to explain the patient's nipple  discharge. Clinical follow-up is recommended.    The findings and recommendations were discussed with the patient the  time of interpretation. She will be scheduled for surgical  consultation and biopsy. A notification was sent to the ordering  provider.    Critical Finding:  See findings. Notification was initiated on  10/16/2023 at 11:47 am by  Jay Linder.  (**-YCF-**) Instructions:  See Impression for specific recommendations.    BI-RADS CATEGORY:    BI-RADS Category:  4 Suspicious.  Recommendation:  Biopsy.  Recommended Date:   Immediate.  Laterality:  Left    For any future breast imaging appointments, please call [phone]  ([phone]).      MACRO:  None      Signed by: Jay Linder 10/16/2023 11:47 AM  Dictation workstation:   [id number]         LABORATORY/PATHOLOGY DATA  Lab Results   Component Value Date    WBC 5.9 12/12/2023    HGB 12.3 12/12/2023    HCT 39.6 12/12/2023    MCV 92 12/12/2023     (H) 12/12/2023        Chemistry    Lab Results   Component Value Date/Time     12/12/2023 1016    K 4.2 12/12/2023 1016     12/12/2023 1016    CO2 29 12/12/2023 1016    BUN 10 12/12/2023 1016    CREATININE 0.76 12/12/2023 1016    Lab Results   Component Value Date/Time    CALCIUM 9.9 12/12/2023 1016    ALKPHOS 83 04/11/2023 0803    AST 17 04/11/2023 0803    ALT 14 04/11/2023 0803    BILITOT 0.4 04/11/2023 0803             IMPRESSION/PLAN    Left sided pT1cN1 ER/MI+ & HER2 neg low clinical risk breast cancer. Favorable oncotype of 13, therefore no chemotherapy benefit.  Completed Radiation therapy with Dr. Taylor enrolled on CCT 1119 5/13/24. Previously Recommend 5-7 yrs of anastrozole per Dr Rafal Ball. She is healed up from radiation. Lengthy Drug education on Anastrozole and Zometa as 3 year course x6 doses was also recommended per Dr Rafal Ball. She is in good dental health.    Prescription sent today for anastrozole. Plan to see us back in 6-8 weeks for drug tolerability follow up and to potentially start Zometa.     There is no evidence of breast cancer recurrence based on her clinical exam today.     Moderate osteopenia w/jf T of -1.8 Sept 2023 (left femur neck).    Per Dr Rafal Ball recommend adjuvant bisphosphonates given she will need 5-7 yrs of anastrozole.  Lengthy drug information on Zometa today, she understands she needs to let us know if she would like to proceed with therapy.    Planned repeat DEXA Dec 2025    General Health maintenance   PCP Dariana Laird PA-C annually and as needed        At least 35 minutes of direct consultation was spent with the patient today reviewing her cancer care plan, educating and answering questions regarding ongoing follow up, greater than 50% in counseling and coordination of care        Cindy Wyman MSN, APRN, FNP-C  [facility]  Division of Medical Oncology- Breast   Collaborating Physician Dr. Rafal Ball   [facility] Nikia [place]hta [place] [address]  Phone: [phone]  Fax: [phone]  Available via Secure Chat    Confidential Peer Review Document-  Privilege  Privileged Pursuant to [place] Revised Code Section 2305.24, .25, .251 & .252

## 2024-05-03 ENCOUNTER — OFFICE VISIT (OUTPATIENT)
Dept: HEMATOLOGY/ONCOLOGY | Facility: HOSPITAL | Age: 69
End: 2024-05-03
Payer: MEDICARE

## 2024-05-03 VITALS
WEIGHT: 220.9 LBS | OXYGEN SATURATION: 96 % | TEMPERATURE: 97.2 F | HEART RATE: 74 BPM | HEIGHT: 62 IN | BODY MASS INDEX: 40.65 KG/M2 | DIASTOLIC BLOOD PRESSURE: 76 MMHG | SYSTOLIC BLOOD PRESSURE: 127 MMHG | RESPIRATION RATE: 20 BRPM

## 2024-05-03 DIAGNOSIS — Z17.0 MALIGNANT NEOPLASM OF LOWER-OUTER QUADRANT OF LEFT BREAST OF FEMALE, ESTROGEN RECEPTOR POSITIVE (MULTI): Primary | ICD-10-CM

## 2024-05-03 DIAGNOSIS — M85.80 OSTEOPENIA, UNSPECIFIED LOCATION: ICD-10-CM

## 2024-05-03 DIAGNOSIS — Z85.3 ENCOUNTER FOR FOLLOW-UP SURVEILLANCE OF BREAST CANCER: ICD-10-CM

## 2024-05-03 DIAGNOSIS — C50.512 MALIGNANT NEOPLASM OF LOWER-OUTER QUADRANT OF LEFT BREAST OF FEMALE, ESTROGEN RECEPTOR POSITIVE (MULTI): Primary | ICD-10-CM

## 2024-05-03 DIAGNOSIS — Z08 ENCOUNTER FOR FOLLOW-UP SURVEILLANCE OF BREAST CANCER: ICD-10-CM

## 2024-05-03 PROCEDURE — 1126F AMNT PAIN NOTED NONE PRSNT: CPT | Performed by: NURSE PRACTITIONER

## 2024-05-03 PROCEDURE — 99215 OFFICE O/P EST HI 40 MIN: CPT | Performed by: NURSE PRACTITIONER

## 2024-05-03 PROCEDURE — 1160F RVW MEDS BY RX/DR IN RCRD: CPT | Performed by: NURSE PRACTITIONER

## 2024-05-03 PROCEDURE — 3074F SYST BP LT 130 MM HG: CPT | Performed by: NURSE PRACTITIONER

## 2024-05-03 PROCEDURE — 3078F DIAST BP <80 MM HG: CPT | Performed by: NURSE PRACTITIONER

## 2024-05-03 PROCEDURE — 1159F MED LIST DOCD IN RCRD: CPT | Performed by: NURSE PRACTITIONER

## 2024-05-03 RX ORDER — ANASTROZOLE 1 MG/1
1 TABLET ORAL DAILY
Qty: 30 TABLET | Refills: 2 | Status: SHIPPED | OUTPATIENT
Start: 2024-05-03 | End: 2024-06-02

## 2024-05-03 ASSESSMENT — PAIN SCALES - GENERAL: PAINLEVEL: 0-NO PAIN

## 2024-05-03 NOTE — PATIENT INSTRUCTIONS
Today I have educated you on the estrogen blocking pill Anastrozole- I am sending in 30 days with 2 additional refills. I will call you in 6-7 weeks     Today we have discussed bisphosphonate therapy with Infusion medication Zometa. Dr Ball is recommending this for risk reduction of microscopic breast cancer spread that rarely can happen in the bones. We would like you to have this medication over a 3 year period (every 6 months x 6 doses through an IV at our infusion center). Labs will always need to be completed within 30 days of each treatment. I have given you tasia information on this today. Please let us know if you would like to proceed.     Cindy SCHULTZ, CNP / Dr Rafal Ball follow up 2 months    Dr Yaya Taylor / Fanta Perez CNP  radiation follow up 8/5/2024    Dr Luis breast surgery follow up with mammogram due in Sept- I have messaged their team to get you scheduled     PCP Dariana Laird Pa-C annually and as needed     Please call [phone] with any questions or concerns prior to your next office visit.

## 2024-05-07 DIAGNOSIS — Z85.3 PERSONAL HISTORY OF BREAST CANCER: ICD-10-CM

## 2024-05-28 ENCOUNTER — NURSE TRIAGE (OUTPATIENT)
Dept: ADMISSION | Facility: HOSPITAL | Age: 69
End: 2024-05-28
Payer: MEDICARE

## 2024-05-28 NOTE — TELEPHONE ENCOUNTER
Dr. Ball secured chat back and wanted patient to stop the Anastrozole now and make a FUV with Cindy Wyman. I called Betty and told her to schedule next available with Cindy Wyman and I transferred her to scheduling to make sooner appointment than the 7/1/24 that she had scheduled now. Patient also verbalized back she will stop taking the Anastrozole now and talk about a new plan with Cindy at appointment.

## 2024-05-28 NOTE — TELEPHONE ENCOUNTER
Betty called nurse line and said she started taking Anastrozole on 5/3/24 and then last week noticed hives starting on face and neck with itching. She said she went to urgent care and they also saw a few hive areas on back. She was given Prednisone 20mg tablets to take 2x a day for five days. She also took OTC allergy medication. She said everything cleared up but now notices this morning her face feels like a rash is starting again when touches her face and itches but doesn't see hive looking areas at this point. Denies any other symptoms at this time. Messaged team and secured chat team.       Additional Information   Did you recently recieve/take any new medicines?     Anastrozole started 5/3/24   Commented on: Do you have any swelling in your face, tongue, throat, or elsewhere?     Face she started getting a rash last week and started 5/3/24 with Anastrozole. 5/22/24  to urgent care and gave Prednisone pills 20mg 2x a day for 5 days. Rash cleared up and the itching stopped. Rash said looked like hives only on right side then spread to rest of face. Urgent care said a few spots on back. Betty said face and neck and itched. No creams prescribed. She also took an allergy pill and took about 3-4 days. All clear until this morning her face is starting to itch again and some bumps she feels starting. Denies any other symptoms.   Commented on: When and where did the rash start? How long have you had the rash?     Last week started rash and better after RX from Urgent care and now starting back today. Never had stopped the Anastrozole during this time.   Commented on: How does the rash look? What color and size is it?     She said can feel bumps not but can't see. When she could see the rash previously it looked like hives.   Commented on: Is the rash raised, flat, a blister? If yes to blisters, are they draining any fluid?     Denies any drainage.   Commented on: Are you having any of these problems?     Denies all  other symptoms besides what is noted.   Commented on: Do you have any skin disease such as eczema or psoriasis?     Denies these problems.   Commented on: When did these problems start?     Started last week and took Anastrozole 5/3/24   Commented on: What helps these problems?     Prednisone.    Protocols used: Rash

## 2024-06-02 PROBLEM — Z78.0 MENOPAUSE: Status: ACTIVE | Noted: 2024-06-02

## 2024-06-03 ENCOUNTER — OFFICE VISIT (OUTPATIENT)
Dept: HEMATOLOGY/ONCOLOGY | Facility: HOSPITAL | Age: 69
End: 2024-06-03
Payer: MEDICARE

## 2024-06-03 VITALS
HEART RATE: 87 BPM | OXYGEN SATURATION: 92 % | BODY MASS INDEX: 41.02 KG/M2 | TEMPERATURE: 98.1 F | SYSTOLIC BLOOD PRESSURE: 118 MMHG | RESPIRATION RATE: 20 BRPM | DIASTOLIC BLOOD PRESSURE: 79 MMHG | WEIGHT: 222.88 LBS | HEIGHT: 62 IN

## 2024-06-03 DIAGNOSIS — Z79.811 AROMATASE INHIBITOR USE: ICD-10-CM

## 2024-06-03 DIAGNOSIS — J45.909 ASTHMA, UNSPECIFIED ASTHMA SEVERITY, UNSPECIFIED WHETHER COMPLICATED, UNSPECIFIED WHETHER PERSISTENT (HHS-HCC): ICD-10-CM

## 2024-06-03 DIAGNOSIS — M85.80 OSTEOPENIA, UNSPECIFIED LOCATION: ICD-10-CM

## 2024-06-03 DIAGNOSIS — C50.512 MALIGNANT NEOPLASM OF LOWER-OUTER QUADRANT OF LEFT BREAST OF FEMALE, ESTROGEN RECEPTOR POSITIVE (MULTI): Primary | ICD-10-CM

## 2024-06-03 DIAGNOSIS — Z17.0 MALIGNANT NEOPLASM OF LOWER-OUTER QUADRANT OF LEFT BREAST OF FEMALE, ESTROGEN RECEPTOR POSITIVE (MULTI): Primary | ICD-10-CM

## 2024-06-03 DIAGNOSIS — Z85.3 ENCOUNTER FOR FOLLOW-UP SURVEILLANCE OF BREAST CANCER: ICD-10-CM

## 2024-06-03 DIAGNOSIS — Z08 ENCOUNTER FOR FOLLOW-UP SURVEILLANCE OF BREAST CANCER: ICD-10-CM

## 2024-06-03 DIAGNOSIS — Z78.0 MENOPAUSE: ICD-10-CM

## 2024-06-03 PROCEDURE — 3074F SYST BP LT 130 MM HG: CPT | Performed by: NURSE PRACTITIONER

## 2024-06-03 PROCEDURE — 1126F AMNT PAIN NOTED NONE PRSNT: CPT | Performed by: NURSE PRACTITIONER

## 2024-06-03 PROCEDURE — 1160F RVW MEDS BY RX/DR IN RCRD: CPT | Performed by: NURSE PRACTITIONER

## 2024-06-03 PROCEDURE — 1159F MED LIST DOCD IN RCRD: CPT | Performed by: NURSE PRACTITIONER

## 2024-06-03 PROCEDURE — 99214 OFFICE O/P EST MOD 30 MIN: CPT | Performed by: NURSE PRACTITIONER

## 2024-06-03 PROCEDURE — 3078F DIAST BP <80 MM HG: CPT | Performed by: NURSE PRACTITIONER

## 2024-06-03 RX ORDER — EPINEPHRINE 0.3 MG/.3ML
0.3 INJECTION SUBCUTANEOUS EVERY 5 MIN PRN
OUTPATIENT
Start: 2024-07-01

## 2024-06-03 RX ORDER — LORATADINE 10 MG/1
10 TABLET ORAL DAILY
Qty: 30 TABLET | Refills: 2 | Status: SHIPPED | OUTPATIENT
Start: 2024-06-03 | End: 2025-06-03

## 2024-06-03 RX ORDER — HEPARIN 100 UNIT/ML
500 SYRINGE INTRAVENOUS AS NEEDED
OUTPATIENT
Start: 2024-06-03

## 2024-06-03 RX ORDER — FLUTICASONE FUROATE, UMECLIDINIUM BROMIDE AND VILANTEROL TRIFENATATE 200; 62.5; 25 UG/1; UG/1; UG/1
1 POWDER RESPIRATORY (INHALATION)
Qty: 60 EACH | Refills: 5 | OUTPATIENT
Start: 2024-06-03

## 2024-06-03 RX ORDER — FAMOTIDINE 10 MG/ML
20 INJECTION INTRAVENOUS ONCE AS NEEDED
OUTPATIENT
Start: 2024-07-01

## 2024-06-03 RX ORDER — ZOLEDRONIC ACID 0.04 MG/ML
4 INJECTION, SOLUTION INTRAVENOUS ONCE
OUTPATIENT
Start: 2024-07-01

## 2024-06-03 RX ORDER — DIPHENHYDRAMINE HYDROCHLORIDE 50 MG/ML
50 INJECTION INTRAMUSCULAR; INTRAVENOUS AS NEEDED
OUTPATIENT
Start: 2024-07-01

## 2024-06-03 RX ORDER — ALBUTEROL SULFATE 0.83 MG/ML
3 SOLUTION RESPIRATORY (INHALATION) AS NEEDED
OUTPATIENT
Start: 2024-07-01

## 2024-06-03 RX ORDER — HEPARIN SODIUM,PORCINE/PF 10 UNIT/ML
50 SYRINGE (ML) INTRAVENOUS AS NEEDED
OUTPATIENT
Start: 2024-06-03

## 2024-06-03 ASSESSMENT — PAIN SCALES - GENERAL: PAINLEVEL: 0-NO PAIN

## 2024-06-03 NOTE — PATIENT INSTRUCTIONS
Because of the rash that was possibly from anastrozole, please start generic Claritin today. I would like you to resume the Anastrozole on 6/6/24. Please call us if the rash returns.     You have agreed to start Zometa- orders placed for labs and first dose of Zometa on 7/1/24.    Dr Rafal Ball follow up 7/1/24, labs and zometa the same day      Dr Yaya Taylor / Fanta Perez CNP  radiation follow up 8/5/2024     Dr Luis breast surgery follow up with mammogram due in Sept 9/10/24     Dr Johnson 7/10/24 annually and as needed     Dermatology Dr Sosa 7/29/24     Please call [phone] with any questions or concerns prior to your next office visit.

## 2024-06-03 NOTE — PROGRESS NOTES
Breast Medical Oncology Clinic  Location: [facility]      BREAST CANCER DIAGNOSIS and HISTORY  Malignant neoplasm of lower-outer quadrant of left breast of female, estrogen receptor positive (Multi), Pathologic: Stage IA (pT1c, pN1a(sn), cM0, G1, ER+, ND+, HER2-)     Patient has a history of benign breast biopsy on the right.     November 26, 2021: Bilateral screening mammogram showed scattered fibroglandular tissue with a left focal asymmetry, BI-RADS 0.     January 20, 2022: Mammogram and ultrasound of the left breast showed the asymmetry persisted.  It was 6 cm from the nipple and measured 5 x 3 x 5 mm BI-RADS Category 3.  Recommendation was for repeat mammogram and ultrasound in 6 months.     September 26, 2023: Patient presented with right nipple discharge and a bilateral mammogram showed scattered fibroglandular tissue with a left breast mass showing an interval change and associated distortion BI-RADS Category 0.  An ultrasound was recommended.     October 6 2023: Ultrasound of the right subareolar area showed no suspicious abnormality.  Ultrasound of the left mass at the 6 o'clock position 5 cm from the nipple showed a 0.8 x 0.6 x 0.8 cm irregular mass with no suspicious left axillary lymph nodes.     November 1, 2023: Biopsy of the left breast mass showed grade 1 invasive ductal carcinoma, ER/ND 95% positive, HER2/thomas negative and a biopsy marker was placed at the time of the biopsy.     December 13, 2023: Patient underwent a left lumpectomy and sentinel lymph node biopsy with Dr. Jocelin Luis, final pathology showed a 1.1 cm grade 1 invasive ductal carcinoma, margins negative, 1 out of 1 lymph nodes positive with 2.8 mm of disease with microscopic KING, pT1 cN1a.  Specimen submitted for Oncotype      January 15, 2024: Patient was seen by Dr. Ball in medical oncology. Recommended 5-7 years anastrozole + Adjuvant bisphosphonate therapy. Oncotype 13     March 13 2024 Completion of Radiation therapy with CCGT  "1119    24 Initiated on Anastrozole.       HISTORY OF PRESENT ILLNESS    Betty Mendieta is a 68 y.o. woman s/p lumpectomy on 23 with left sided 1.1 IDC and 11 SLN, I.e. gD3vA1l ER/LA>95% HER2 neg. Oncotype DX 13. She has been initiated on CCTG 1119. She presents today for problem focused visit- She has been compliant on anastrozole since 24, but has held it since . She had when she went to urgent care on  for rash mostly on her face and neck. Was placed on steroids and non sedating anti histamine for 5 days. She had never stopped the anastrozole until  when the itching and rash returned. She reports then she called the triage nurse and we advised her to hold the anastrozole until today.     She reports increase in fatigue, legs and arm feel \"heavy\" since starting the pill. She walks 3 days a week for exercise- 40 min at her local track. She denies any new breast cancer concerns.       Review of Systems   ROS 14 points performed, See HPI for exceptions      Past Medical History:  has a past medical history of Asthma (HHS-HCC), Breast cancer, left breast (Multi), Cataract, Diverticulosis, Essential (primary) hypertension (10/26/2018), GERD (gastroesophageal reflux disease), Ocular hypertension, unspecified eye (2018), Preglaucoma, unspecified, unspecified eye, Radiculopathy, lumbar region (2016), Unilateral primary osteoarthritis, left knee (2021), and Vitamin D deficiency.  Surgical History:   has a past surgical history that includes  section, classic; Tubal ligation; Breast biopsy (Right); Breast biopsy (Left, 2023); Total knee arthroplasty (Left); Total knee arthroplasty (Right); BI US guided breast localization left (Left, 2023); and Breast lumpectomy.  Social History:   reports that she has never smoked. She has never used smokeless tobacco. She reports that she does not drink alcohol and does not use drugs.  She is a retired  in " ". She lives with her grandson Zac (is 22). She is  since .     GYN:  2 adult children in [place]   Family History:    Family History   Problem Relation Name Age of Onset    Hypertension Mother      Asthma Father      Hypertension Father      Glaucoma Sister      Hypertension Sister      Stroke Brother       Family Oncology History:  Cancer-related family history is not on file.      OBJECTIVE    VS / Pain:  /79   Pulse 87   Temp 36.7 °C (98.1 °F) (Temporal)   Resp 20   Ht 1.575 m (5' 2.01\")   Wt 101 kg (222 lb 14.2 oz)   SpO2 92%   BMI 40.76 kg/m²   BSA: 2.1 meters squared   Pain Scale: 0    Performance Status:  The ECOG performance scale today is ECO- Fully active, able to carry on all pre-disease performance w/o restriction.      Physical Exam   Constitutional: Well developed, awake/alert/oriented x4, no distress, alert and cooperative  EYES: Sclera clear  ENMT: mucous membranes moist, no apparent injury, no lesions seen  Head/Neck: Neck supple, no apparent injury, thyroid without mass or tenderness, No JVD, trachea midline, no bruits  Respiratory / Thoracic: Patent airways, clear to all lobes, normal breath sounds with good chest expansion, thorax symmetric.  Cardiovascular: Regular, rate and rhythm, no murmurs, 2+ equal pulses of the extremities, normal auscultated S 1and S 2  GI: Nondistended, soft, non-tender, no rebound tenderness or guarding, no masses palpable, no organomegaly, +BS, no bruits  Musculoskeletal: ROM intact, no joint swelling, normal strength, no spinal tenderness  Extremities: normal extremities, no cyanosis edema, contusions or wounds, no clubbing  Neurological: alert and oriented x4, intact senses, motor, response and reflexes, normal strength  Breast: s/p left lumpectomy  and radiation therapy. Mild skin thickening and hyperpigmentation due to radiation therapy. No palpable masses or lesions in either breast.   Lymphatic: No cervical, " supraclavicular, infraclavicular or axillary lymphadenopathy  Psychological: Appropriate and talkative mood and behavior  Skin: Warm and dry, no lesions, no rashes, no jaundice. X3 hives on posterior neck and x2 in right mid arm      Diagnostic Results        Study Result  Interpreted By: KALEB SIM MD  MRN: [id number]  Patient Name: CONNOR SMILEY    STUDY:  BONE DENSITY, DEXA 1 OR MORE SITES: AXIAL SKELETN9/25/2023 10:05 am    INDICATION:  screening. The patient is a 67 y/o year old F.    COMPARISON:  No comparison available.    ACCESSION NUMBER(S):  [id number]    ORDERING CLINICIAN:  RENZO BRYSON    TECHNIQUE:  BONE DENSITY, DEXA 1 OR MORE SITES: AXIAL SKELETN    FINDINGS:  SPINE L1-L4  Bone Mineral Density: 1.042  T-Score 0.0 Z-Score 1  Classification: Normal  Bone Mineral Density change vs baseline: Not reported  Bone Mineral Density change vs previous: Not reported    LEFT FEMUR -TOTAL  Bone Mineral Density: 0.807  T-Score -1.1 Z-Score -0.4  Classification: Osteopenia  Bone Mineral Density change vs baseline: Not reported  Bone Mineral Density change vs previous: Not reported    LEFT FEMUR -NECK  Bone Mineral Density: 0.648  T-Score -1.8 Z-Score -0.8  Classification: Osteopenia      World Health Organization (WHO) criteria for post-menopausal,   Women:  Normal: T-score at or above -1 SD  Osteopenia: T-score between -1 and -2.5 SD  Osteoporosis: T-score at or below -2.5 SD      10-year Fracture Risk:  Major Osteoporotic Fracture 4.1  Hip Fracture 0.5    Note: If no FRAX score is reported, it is because:  Some T-score for Spine Total or Hip Total or Femoral Neck at or below  -2.5    This exam was performed at [facility] on a [facility] Dexa Unit.      IMPRESSION:  DEXA: According to World Health Organization criteria,  classification is  low bone mass (osteopenia)    Followup recommended in two years or sooner as clinically warranted.    All images and detailed analysis are  available on the  Radiology  PACS.    MACRO:  None       BI US breast limited bilateral 10/16/2023    Narrative  Interpreted By:  Jay Linder,  STUDY:  BI US BREAST LIMITED BILATERAL;  10/16/2023 11:12 am    ACCESSION NUMBER(S):  [id number]    ORDERING CLINICIAN:  RENZO BRYSON    INDICATION:  Callback from screening. Nipple discharge.    COMPARISON:  Mammogram dated 09/26/2023    FINDINGS:  The patient was recalled for a mass in the left breast. Since her  screening mammogram, she has developed right nipple discharge.    Sonographic images were obtained of the right breast in the  subareolar region to evaluate the nipple discharge. No suspicious  sonographic abnormality is identified. There is a dilated duct which  demonstrates no internal echoes or intraductal masses. Color Doppler  imaging demonstrates normal vascularity. Elastography shows soft  tissue.    The left breast was evaluated. At the 6 o'clock position 5 cm from  the nipple the correlate for the mammographic mass is identified. It  is a irregular ill-defined hypoechoic mass with internal vascularity  which demonstrates areas of increased stiffness on elastography. The  mass measures 0.8 x 0.6 x 0.8 cm.    The left axilla demonstrates only normal appearing lymph nodes.    Impression  Mass for which the patient was recalled demonstrates both suspicious  features mammographically and sonographically. An ultrasound-guided  biopsy is recommended.    No suspicious findings seen to explain the patient's nipple  discharge. Clinical follow-up is recommended.    The findings and recommendations were discussed with the patient the  time of interpretation. She will be scheduled for surgical  consultation and biopsy. A notification was sent to the ordering  provider.    Critical Finding:  See findings. Notification was initiated on  10/16/2023 at 11:47 am by  Jay Linder.  (**-YCF-**) Instructions:  See Impression for specific recommendations.    BI-RADS  CATEGORY:    BI-RADS Category:  4 Suspicious.  Recommendation:  Biopsy.  Recommended Date:  Immediate.  Laterality:  Left    For any future breast imaging appointments, please call [phone]).      MACRO:  None      Signed by: Jay Linder 10/16/2023 11:47 AM  Dictation workstation:   [id number]         LABORATORY/PATHOLOGY DATA  Lab Results   Component Value Date    WBC 5.9 12/12/2023    HGB 12.3 12/12/2023    HCT 39.6 12/12/2023    MCV 92 12/12/2023     (H) 12/12/2023        Chemistry    Lab Results   Component Value Date/Time     12/12/2023 1016    K 4.2 12/12/2023 1016     12/12/2023 1016    CO2 29 12/12/2023 1016    BUN 10 12/12/2023 1016    CREATININE 0.76 12/12/2023 1016    Lab Results   Component Value Date/Time    CALCIUM 9.9 12/12/2023 1016    ALKPHOS 83 04/11/2023 0803    AST 17 04/11/2023 0803    ALT 14 04/11/2023 0803    BILITOT 0.4 04/11/2023 0803             IMPRESSION/PLAN    Left sided pT1cN1 ER/FL+ & HER2 neg low clinical risk breast cancer. Favorable oncotype of 13, therefore no chemotherapy benefit.  Completed Radiation therapy with Dr. Taylor enrolled on CCTG 1119 5/13/24. Previously Recommend 5-7 yrs of anastrozole per Dr Rafal Ball. May 3rd initiated on Anastrozole. Due to rash, anastrozole on hold for 1 week. I have instructed her to start generic anti histamine today and resume anastrozole in 4 days. She is agreeable to a trial of this medication change and will update us if rash resumes.     There is no evidence of breast cancer recurrence based on her clinical exam today. RTC in 1 month    Moderate osteopenia w/jf T of -1.8 Sept 2023 (left femur neck).    Per Dr Rafal Ball recommend adjuvant bisphosphonates given she will need 5-7 yrs of anastrozole.  Planned start Zometa at next Office visit.  She is in good dental health.  Planned repeat DEXA Dec 2025    General Health maintenance   PCP Dr Amol Johnson / Dariana Laird PADENZEL annually and as needed        At least 20 minutes of direct consultation was spent with the patient today reviewing her cancer care plan, educating and answering questions regarding ongoing follow up, greater than 50% in counseling and coordination of care        Cindy Wyman MSN, APRN, FNP-C  [facility]  Division of Medical Oncology- Breast   Collaborating Physician Dr. Rafal Ball   [facility] [address] [place] [place] [address]  Phone: [phone]  Fax: [phone]  Available via Secure Chat    Confidential Peer Review Document-  Privilege  Privileged Pursuant to [place] Revised Code Section 2305.24, .25, .251 & .252

## 2024-06-11 DIAGNOSIS — J45.909 ASTHMA, UNSPECIFIED ASTHMA SEVERITY, UNSPECIFIED WHETHER COMPLICATED, UNSPECIFIED WHETHER PERSISTENT (HHS-HCC): ICD-10-CM

## 2024-06-11 RX ORDER — FLUTICASONE FUROATE, UMECLIDINIUM BROMIDE AND VILANTEROL TRIFENATATE 200; 62.5; 25 UG/1; UG/1; UG/1
1 POWDER RESPIRATORY (INHALATION)
Qty: 60 EACH | Refills: 0 | Status: SHIPPED | OUTPATIENT
Start: 2024-06-11

## 2024-06-13 ENCOUNTER — NURSE TRIAGE (OUTPATIENT)
Dept: HEMATOLOGY/ONCOLOGY | Facility: HOSPITAL | Age: 69
End: 2024-06-13
Payer: MEDICARE

## 2024-06-13 DIAGNOSIS — L29.8 CHRONIC PRURITIC RASH IN ADULT: Primary | ICD-10-CM

## 2024-06-13 DIAGNOSIS — C50.512 MALIGNANT NEOPLASM OF LOWER-OUTER QUADRANT OF LEFT BREAST OF FEMALE, ESTROGEN RECEPTOR POSITIVE (MULTI): Primary | ICD-10-CM

## 2024-06-13 DIAGNOSIS — Z17.0 MALIGNANT NEOPLASM OF LOWER-OUTER QUADRANT OF LEFT BREAST OF FEMALE, ESTROGEN RECEPTOR POSITIVE (MULTI): Primary | ICD-10-CM

## 2024-06-13 RX ORDER — HYDROXYZINE HYDROCHLORIDE 25 MG/1
25 TABLET, FILM COATED ORAL EVERY 8 HOURS PRN
Qty: 60 TABLET | Refills: 0 | Status: SHIPPED | OUTPATIENT
Start: 2024-06-13

## 2024-06-13 RX ORDER — EXEMESTANE 25 MG/1
25 TABLET ORAL DAILY
Qty: 30 TABLET | Refills: 2 | Status: SHIPPED | OUTPATIENT
Start: 2024-06-13 | End: 2024-07-13

## 2024-06-13 NOTE — TELEPHONE ENCOUNTER
Pt resumed anastrozole on 6/10 and now has recurrence of rash.  She notes small, red, very itchy bumps on bilateral arms and legs.  No blisters or open sores.  No fevers, chest pain or difficulty breathing.  She had been taking anastrozole with antihistamine as instructed.  Benadryl calms the itching somewhat but pt is asking if something stronger can be prescribed for itching?  Preferred pharmacy is [facility] in [place].      Additional Information   Did you recently recieve/take any new medicines?     Resumed anastrozole on 6/10/24  Previously had a rash on her face after she initially started the medication   Commented on: If the rash itches, is it getting worse? Does anything help relieve the itching?     Took benadryl which pacified the itching somewhat   Commented on: What else do you want to tell me about this problem?     No fevers, chest pain or difficulty breathing    Protocols used: Rash

## 2024-06-13 NOTE — TELEPHONE ENCOUNTER
Per Cindy Wyman CNP, pt instructed to stop anastrozole.  New prescription for exemestane was sent in and pt should wait two weeks before starting to allow rash time to resolve.  Prescription for atarax for itching was also sent to her pharmacy.  Pt able to provide verbal recall of medication instructions and will call back if anything changes or worsens prior to her follow up on 7/1/24.

## 2024-06-26 DIAGNOSIS — I10 PRIMARY HYPERTENSION: ICD-10-CM

## 2024-07-01 ENCOUNTER — APPOINTMENT (OUTPATIENT)
Dept: HEMATOLOGY/ONCOLOGY | Facility: HOSPITAL | Age: 69
End: 2024-07-01
Payer: MEDICARE

## 2024-07-01 ENCOUNTER — INFUSION (OUTPATIENT)
Dept: HEMATOLOGY/ONCOLOGY | Facility: HOSPITAL | Age: 69
End: 2024-07-01
Payer: MEDICARE

## 2024-07-01 ENCOUNTER — LAB (OUTPATIENT)
Dept: LAB | Facility: HOSPITAL | Age: 69
End: 2024-07-01
Payer: MEDICARE

## 2024-07-01 ENCOUNTER — OFFICE VISIT (OUTPATIENT)
Dept: HEMATOLOGY/ONCOLOGY | Facility: HOSPITAL | Age: 69
End: 2024-07-01
Payer: MEDICARE

## 2024-07-01 VITALS
DIASTOLIC BLOOD PRESSURE: 83 MMHG | HEART RATE: 86 BPM | OXYGEN SATURATION: 95 % | WEIGHT: 221.34 LBS | RESPIRATION RATE: 18 BRPM | TEMPERATURE: 97.3 F | BODY MASS INDEX: 40.47 KG/M2 | SYSTOLIC BLOOD PRESSURE: 131 MMHG

## 2024-07-01 DIAGNOSIS — Z17.0 MALIGNANT NEOPLASM OF LOWER-OUTER QUADRANT OF LEFT BREAST OF FEMALE, ESTROGEN RECEPTOR POSITIVE (MULTI): ICD-10-CM

## 2024-07-01 DIAGNOSIS — Z85.3 ENCOUNTER FOR FOLLOW-UP SURVEILLANCE OF BREAST CANCER: ICD-10-CM

## 2024-07-01 DIAGNOSIS — C50.512 MALIGNANT NEOPLASM OF LOWER-OUTER QUADRANT OF LEFT BREAST OF FEMALE, ESTROGEN RECEPTOR POSITIVE (MULTI): ICD-10-CM

## 2024-07-01 DIAGNOSIS — Z08 ENCOUNTER FOR FOLLOW-UP SURVEILLANCE OF BREAST CANCER: ICD-10-CM

## 2024-07-01 DIAGNOSIS — Z79.811 AROMATASE INHIBITOR USE: ICD-10-CM

## 2024-07-01 LAB
ALBUMIN SERPL BCP-MCNC: 3.8 G/DL (ref 3.4–5)
ALP SERPL-CCNC: 72 U/L (ref 33–136)
ALT SERPL W P-5'-P-CCNC: 15 U/L (ref 7–45)
ANION GAP SERPL CALC-SCNC: 13 MMOL/L (ref 10–20)
AST SERPL W P-5'-P-CCNC: 17 U/L (ref 9–39)
BASOPHILS # BLD AUTO: 0.07 X10*3/UL (ref 0–0.1)
BASOPHILS NFR BLD AUTO: 1.5 %
BILIRUB SERPL-MCNC: 0.4 MG/DL (ref 0–1.2)
BUN SERPL-MCNC: 12 MG/DL (ref 6–23)
CALCIUM SERPL-MCNC: 9.3 MG/DL (ref 8.6–10.3)
CHLORIDE SERPL-SCNC: 105 MMOL/L (ref 98–107)
CO2 SERPL-SCNC: 28 MMOL/L (ref 21–32)
CREAT SERPL-MCNC: 0.76 MG/DL (ref 0.5–1.05)
EGFRCR SERPLBLD CKD-EPI 2021: 87 ML/MIN/1.73M*2
EOSINOPHIL # BLD AUTO: 0.17 X10*3/UL (ref 0–0.7)
EOSINOPHIL NFR BLD AUTO: 3.6 %
ERYTHROCYTE [DISTWIDTH] IN BLOOD BY AUTOMATED COUNT: 13.1 % (ref 11.5–14.5)
GLUCOSE SERPL-MCNC: 138 MG/DL (ref 74–99)
HCT VFR BLD AUTO: 39.7 % (ref 36–46)
HGB BLD-MCNC: 12.7 G/DL (ref 12–16)
IMM GRANULOCYTES # BLD AUTO: 0.01 X10*3/UL (ref 0–0.7)
IMM GRANULOCYTES NFR BLD AUTO: 0.2 % (ref 0–0.9)
LYMPHOCYTES # BLD AUTO: 0.62 X10*3/UL (ref 1.2–4.8)
LYMPHOCYTES NFR BLD AUTO: 13.1 %
MCH RBC QN AUTO: 29.3 PG (ref 26–34)
MCHC RBC AUTO-ENTMCNC: 32 G/DL (ref 32–36)
MCV RBC AUTO: 92 FL (ref 80–100)
MONOCYTES # BLD AUTO: 0.4 X10*3/UL (ref 0.1–1)
MONOCYTES NFR BLD AUTO: 8.5 %
NEUTROPHILS # BLD AUTO: 3.45 X10*3/UL (ref 1.2–7.7)
NEUTROPHILS NFR BLD AUTO: 73.1 %
NRBC BLD-RTO: 0 /100 WBCS (ref 0–0)
PLATELET # BLD AUTO: 366 X10*3/UL (ref 150–450)
POTASSIUM SERPL-SCNC: 3.8 MMOL/L (ref 3.5–5.3)
PROT SERPL-MCNC: 6.7 G/DL (ref 6.4–8.2)
RBC # BLD AUTO: 4.33 X10*6/UL (ref 4–5.2)
SODIUM SERPL-SCNC: 142 MMOL/L (ref 136–145)
WBC # BLD AUTO: 4.7 X10*3/UL (ref 4.4–11.3)

## 2024-07-01 PROCEDURE — 3079F DIAST BP 80-89 MM HG: CPT | Performed by: INTERNAL MEDICINE

## 2024-07-01 PROCEDURE — 1160F RVW MEDS BY RX/DR IN RCRD: CPT | Performed by: INTERNAL MEDICINE

## 2024-07-01 PROCEDURE — 80053 COMPREHEN METABOLIC PANEL: CPT

## 2024-07-01 PROCEDURE — 96365 THER/PROPH/DIAG IV INF INIT: CPT | Mod: INF

## 2024-07-01 PROCEDURE — 3075F SYST BP GE 130 - 139MM HG: CPT | Performed by: INTERNAL MEDICINE

## 2024-07-01 PROCEDURE — 2500000004 HC RX 250 GENERAL PHARMACY W/ HCPCS (ALT 636 FOR OP/ED): Performed by: NURSE PRACTITIONER

## 2024-07-01 PROCEDURE — 96360 HYDRATION IV INFUSION INIT: CPT | Mod: INF

## 2024-07-01 PROCEDURE — 99214 OFFICE O/P EST MOD 30 MIN: CPT | Performed by: INTERNAL MEDICINE

## 2024-07-01 PROCEDURE — 1159F MED LIST DOCD IN RCRD: CPT | Performed by: INTERNAL MEDICINE

## 2024-07-01 PROCEDURE — 85025 COMPLETE CBC W/AUTO DIFF WBC: CPT

## 2024-07-01 PROCEDURE — 1126F AMNT PAIN NOTED NONE PRSNT: CPT | Performed by: INTERNAL MEDICINE

## 2024-07-01 PROCEDURE — 36415 COLL VENOUS BLD VENIPUNCTURE: CPT

## 2024-07-01 RX ORDER — EPINEPHRINE 0.3 MG/.3ML
0.3 INJECTION SUBCUTANEOUS EVERY 5 MIN PRN
OUTPATIENT
Start: 2024-12-16

## 2024-07-01 RX ORDER — DIPHENHYDRAMINE HYDROCHLORIDE 50 MG/ML
50 INJECTION INTRAMUSCULAR; INTRAVENOUS AS NEEDED
OUTPATIENT
Start: 2024-12-16

## 2024-07-01 RX ORDER — HEPARIN SODIUM,PORCINE/PF 10 UNIT/ML
50 SYRINGE (ML) INTRAVENOUS AS NEEDED
OUTPATIENT
Start: 2024-07-01

## 2024-07-01 RX ORDER — ALBUTEROL SULFATE 0.83 MG/ML
3 SOLUTION RESPIRATORY (INHALATION) AS NEEDED
OUTPATIENT
Start: 2024-12-16

## 2024-07-01 RX ORDER — HEPARIN 100 UNIT/ML
500 SYRINGE INTRAVENOUS AS NEEDED
OUTPATIENT
Start: 2024-07-01

## 2024-07-01 RX ORDER — FAMOTIDINE 10 MG/ML
20 INJECTION INTRAVENOUS ONCE AS NEEDED
OUTPATIENT
Start: 2024-12-16

## 2024-07-01 ASSESSMENT — PAIN SCALES - GENERAL: PAINLEVEL: 0-NO PAIN

## 2024-07-01 ASSESSMENT — ENCOUNTER SYMPTOMS
OCCASIONAL FEELINGS OF UNSTEADINESS: 0
LOSS OF SENSATION IN FEET: 0
DEPRESSION: 0

## 2024-07-01 NOTE — PATIENT INSTRUCTIONS
Follow-up with yrn in about 4 months  Start taking the other medication exemestane  Zometa every 6 months for next 3 yrs to strengthen your bones and reduce risk of breast cancer spreading to bones  Please call us at [phone] option 5 then option 2 with any questions or concerns

## 2024-07-01 NOTE — PROGRESS NOTES
Pt arrived ambulatory to infusion for treatment of zometa.  Denies any new or worsening symptoms. Tolerated infusion without issue. Discharged in stable condition.

## 2024-07-02 ASSESSMENT — ENCOUNTER SYMPTOMS
NUMBNESS: 0
SCLERAL ICTERUS: 0
NERVOUS/ANXIOUS: 0
DIAPHORESIS: 0
MYALGIAS: 0
BLOOD IN STOOL: 0
DEPRESSION: 0
DIARRHEA: 0
PALPITATIONS: 0
LEG SWELLING: 0
SLEEP DISTURBANCE: 0
DIFFICULTY URINATING: 0
ABDOMINAL PAIN: 0
HEMOPTYSIS: 0
FREQUENCY: 0
WHEEZING: 0
EXTREMITY WEAKNESS: 0
SHORTNESS OF BREATH: 0
EYE PROBLEMS: 0
CHEST TIGHTNESS: 0
CHILLS: 0
BACK PAIN: 0
CARDIOVASCULAR NEGATIVE: 1
FATIGUE: 0
CONFUSION: 0
HOT FLASHES: 0
BRUISES/BLEEDS EASILY: 0
COUGH: 0
HEADACHES: 0
DIZZINESS: 0
ADENOPATHY: 0
EYES NEGATIVE: 1
CONSTIPATION: 0
APPETITE CHANGE: 0
SEIZURES: 0
ARTHRALGIAS: 0
NAUSEA: 0
ABDOMINAL DISTENTION: 0
RESPIRATORY NEGATIVE: 1
FEVER: 0
HEMATURIA: 0
DYSURIA: 0
CONSTITUTIONAL NEGATIVE: 1

## 2024-07-02 NOTE — PROGRESS NOTES
Breast Medical Oncology Clinic  Location: [facility]      BREAST CANCER DIAGNOSIS  Malignant neoplasm of lower-outer quadrant of left breast of female, estrogen receptor positive (Multi), Pathologic: Stage IA (pT1c, pN1a(sn), cM0, G1, ER+, AZ+, HER2-)     CURRENT THERAPY  Anastrozole is on hold due to rash/pruritus  Adjuvant zometa #1/6, initiated on 7/1/24    HISTORY OF PRESENT ILLNESS    Betty Mendieta is a 68 y.o. woman s/p lumpectomy on 12/13/23 with left sided 1.1 IDC and 1/1 SLN, I.e. [id number] ER/AZ>95% HER2 neg. Oncotype DX =13.  Completed Radiation therapy with CCGT 1119 on 3/13/24.  Started Anastrozole 5/4/24 but developed rash and pruritus about a month into starting. Has been off of it and rash/pruritus has resolved. Scheduled to receive zometa today. Plan is to switch to exemestane which she has picked up from her pharmacy.          Review of Systems   Constitutional: Negative.  Negative for appetite change, chills, diaphoresis, fatigue and fever.   HENT:  Negative.  Negative for hearing loss and lump/mass.    Eyes: Negative.  Negative for eye problems and icterus.   Respiratory: Negative.  Negative for chest tightness, cough, hemoptysis, shortness of breath and wheezing.    Cardiovascular: Negative.  Negative for chest pain, leg swelling and palpitations.   Gastrointestinal:  Negative for abdominal distention, abdominal pain, blood in stool, constipation, diarrhea and nausea.   Endocrine: Negative for hot flashes.   Genitourinary:  Negative for bladder incontinence, difficulty urinating, dyspareunia, dysuria, frequency and hematuria.    Musculoskeletal:  Negative for arthralgias, back pain, gait problem and myalgias.   Neurological:  Negative for dizziness, extremity weakness, gait problem, headaches, numbness and seizures.   Hematological:  Negative for adenopathy. Does not bruise/bleed easily.   Psychiatric/Behavioral:  Negative for confusion, depression and sleep disturbance. The patient is not  nervous/anxious.    All other systems reviewed and are negative.        Past Medical History:  has a past medical history of Asthma (HHS-HCC), Breast cancer, left breast (Multi), Cataract, Diverticulosis, Essential (primary) hypertension (10/26/2018), GERD (gastroesophageal reflux disease), Ocular hypertension, unspecified eye (2018), Preglaucoma, unspecified, unspecified eye, Radiculopathy, lumbar region (2016), Unilateral primary osteoarthritis, left knee (2021), and Vitamin D deficiency.  Surgical History:   has a past surgical history that includes  section, classic; Tubal ligation; Breast biopsy (Right); Breast biopsy (Left, 2023); Total knee arthroplasty (Left); Total knee arthroplasty (Right); BI US guided breast localization left (Left, 2023); and Breast lumpectomy.  Social History:   reports that she has never smoked. She has never used smokeless tobacco. She reports that she does not drink alcohol and does not use drugs.  Family History:    Family History   Problem Relation Name Age of Onset    Hypertension Mother      Asthma Father      Hypertension Father      Glaucoma Sister      Hypertension Sister      Stroke Brother       Family Oncology History:  Cancer-related family history is not on file.      OBJECTIVE    VS / Pain:  /83   Pulse 86   Temp 36.3 °C (97.3 °F)   Resp 18   Wt 100 kg (221 lb 5.5 oz)   SpO2 95%   BMI 40.47 kg/m²   BSA: 2.09 meters squared   Pain Scale: 0    Performance Status:  The ECOG performance scale today is ECO- Fully active, able to carry on all pre-disease performance w/o restriction.    Physical Exam  Constitutional:       General: She is not in acute distress.     Appearance: She is not ill-appearing, toxic-appearing or diaphoretic.   HENT:      Nose: No congestion or rhinorrhea.      Mouth/Throat:      Pharynx: No posterior oropharyngeal erythema.   Cardiovascular:      Rate and Rhythm: Normal rate and regular rhythm.       Pulses: Normal pulses.      Heart sounds: Normal heart sounds. No murmur heard.     No gallop.   Pulmonary:      Breath sounds: Normal breath sounds.   Abdominal:      General: There is no distension.      Palpations: There is no mass.      Tenderness: There is no abdominal tenderness.   Musculoskeletal:         General: No swelling.      Cervical back: No rigidity.      Right lower leg: No edema.      Left lower leg: No edema.   Lymphadenopathy:      Cervical: No cervical adenopathy.   Skin:     General: Skin is warm.      Coloration: Skin is not cyanotic.      Findings: No bruising, ecchymosis or erythema.   Neurological:      General: No focal deficit present.      Mental Status: She is oriented to person, place, and time.      Cranial Nerves: Cranial nerves 2-12 are intact.      Motor: Motor function is intact.   Psychiatric:         Attention and Perception: Attention and perception normal.         Mood and Affect: Mood and affect normal.         Behavior: Behavior normal.         Thought Content: Thought content normal.         Judgment: Judgment normal.             Diagnostic Results        Study Result  Interpreted By: KALEB SIM MD  MRN: [id number]  Patient Name: CONNOR SMILEY    STUDY:  BONE DENSITY, DEXA 1 OR MORE SITES: AXIAL SKELETN9/25/2023 10:05 am    INDICATION:  screening. The patient is a 69 y/o year old F.    COMPARISON:  No comparison available.    ACCESSION NUMBER(S):  [id number]    ORDERING CLINICIAN:  RENZO BRYSON    TECHNIQUE:  BONE DENSITY, DEXA 1 OR MORE SITES: AXIAL SKELETN    FINDINGS:  SPINE L1-L4  Bone Mineral Density: 1.042  T-Score 0.0 Z-Score 1  Classification: Normal  Bone Mineral Density change vs baseline: Not reported  Bone Mineral Density change vs previous: Not reported    LEFT FEMUR -TOTAL  Bone Mineral Density: 0.807  T-Score -1.1 Z-Score -0.4  Classification: Osteopenia  Bone Mineral Density change vs baseline: Not reported  Bone Mineral Density change vs previous: Not  reported    LEFT FEMUR -NECK  Bone Mineral Density: 0.648  T-Score -1.8 Z-Score -0.8  Classification: Osteopenia      World Health Organization (WHO) criteria for post-menopausal,   Women:  Normal: T-score at or above -1 SD  Osteopenia: T-score between -1 and -2.5 SD  Osteoporosis: T-score at or below -2.5 SD      10-year Fracture Risk:  Major Osteoporotic Fracture 4.1  Hip Fracture 0.5    Note: If no FRAX score is reported, it is because:  Some T-score for Spine Total or Hip Total or Femoral Neck at or below  -2.5    This exam was performed at [facility] on a H[facility]  [facility] Dexa Unit.      IMPRESSION:  DEXA: According to World Health Organization criteria,  classification is  low bone mass (osteopenia)    Followup recommended in two years or sooner as clinically warranted.    All images and detailed analysis are available on the  Radiology  PACS.    MACRO:  None       BI US breast limited bilateral 10/16/2023    Narrative  Interpreted By:  Jay Linder,  STUDY:  BI US BREAST LIMITED BILATERAL;  10/16/2023 11:12 am    ACCESSION NUMBER(S):  [id number]    ORDERING CLINICIAN:  RENZO BRYSON    INDICATION:  Callback from screening. Nipple discharge.    COMPARISON:  Mammogram dated 09/26/2023    FINDINGS:  The patient was recalled for a mass in the left breast. Since her  screening mammogram, she has developed right nipple discharge.    Sonographic images were obtained of the right breast in the  subareolar region to evaluate the nipple discharge. No suspicious  sonographic abnormality is identified. There is a dilated duct which  demonstrates no internal echoes or intraductal masses. Color Doppler  imaging demonstrates normal vascularity. Elastography shows soft  tissue.    The left breast was evaluated. At the 6 o'clock position 5 cm from  the nipple the correlate for the mammographic mass is identified. It  is a irregular ill-defined hypoechoic mass with internal vascularity  which  demonstrates areas of increased stiffness on elastography. The  mass measures 0.8 x 0.6 x 0.8 cm.    The left axilla demonstrates only normal appearing lymph nodes.    Impression  Mass for which the patient was recalled demonstrates both suspicious  features mammographically and sonographically. An ultrasound-guided  biopsy is recommended.    No suspicious findings seen to explain the patient's nipple  discharge. Clinical follow-up is recommended.    The findings and recommendations were discussed with the patient the  time of interpretation. She will be scheduled for surgical  consultation and biopsy. A notification was sent to the ordering  provider.    Critical Finding:  See findings. Notification was initiated on  10/16/2023 at 11:47 am by  Jay Linder.  (**-YCF-**) Instructions:  See Impression for specific recommendations.    BI-RADS CATEGORY:    BI-RADS Category:  4 Suspicious.  Recommendation:  Biopsy.  Recommended Date:  Immediate.  Laterality:  Left    For any future breast imaging appointments, please call [phone]).      MACRO:  None      Signed by: Jay Linder 10/16/2023 11:47 AM  Dictation workstation:   [id number]     No images are attached to the encounter.    LABORATORY/PATHOLOGY DATA    Lab on 07/01/2024   Component Date Value Ref Range Status    Glucose 07/01/2024 138 (H)  74 - 99 mg/dL Final    Sodium 07/01/2024 142  136 - 145 mmol/L Final    Potassium 07/01/2024 3.8  3.5 - 5.3 mmol/L Final    Chloride 07/01/2024 105  98 - 107 mmol/L Final    Bicarbonate 07/01/2024 28  21 - 32 mmol/L Final    Anion Gap 07/01/2024 13  10 - 20 mmol/L Final    Urea Nitrogen 07/01/2024 12  6 - 23 mg/dL Final    Creatinine 07/01/2024 0.76  0.50 - 1.05 mg/dL Final    eGFR 07/01/2024 87  >60 mL/min/1.73m*2 Final    Calcium 07/01/2024 9.3  8.6 - 10.3 mg/dL Final    Albumin 07/01/2024 3.8  3.4 - 5.0 g/dL Final    Alkaline Phosphatase 07/01/2024 72  33 - 136 U/L Final    Total Protein 07/01/2024 6.7  6.4 -  8.2 g/dL Final    AST 07/01/2024 17  9 - 39 U/L Final    Bilirubin, Total 07/01/2024 0.4  0.0 - 1.2 mg/dL Final    ALT 07/01/2024 15  7 - 45 U/L Final    WBC 07/01/2024 4.7  4.4 - 11.3 x10*3/uL Final    nRBC 07/01/2024 0.0  0.0 - 0.0 /100 WBCs Final    RBC 07/01/2024 4.33  4.00 - 5.20 x10*6/uL Final    Hemoglobin 07/01/2024 12.7  12.0 - 16.0 g/dL Final    Hematocrit 07/01/2024 39.7  36.0 - 46.0 % Final    MCV 07/01/2024 92  80 - 100 fL Final    MCH 07/01/2024 29.3  26.0 - 34.0 pg Final    MCHC 07/01/2024 32.0  32.0 - 36.0 g/dL Final    RDW 07/01/2024 13.1  11.5 - 14.5 % Final    Platelets 07/01/2024 366  150 - 450 x10*3/uL Final    Neutrophils % 07/01/2024 73.1  40.0 - 80.0 % Final    Immature Granulocytes %, Automated 07/01/2024 0.2  0.0 - 0.9 % Final    Lymphocytes % 07/01/2024 13.1  13.0 - 44.0 % Final    Monocytes % 07/01/2024 8.5  2.0 - 10.0 % Final    Eosinophils % 07/01/2024 3.6  0.0 - 6.0 % Final    Basophils % 07/01/2024 1.5  0.0 - 2.0 % Final    Neutrophils Absolute 07/01/2024 3.45  1.20 - 7.70 x10*3/uL Final    Immature Granulocytes Absolute, Au* 07/01/2024 0.01  0.00 - 0.70 x10*3/uL Final    Lymphocytes Absolute 07/01/2024 0.62 (L)  1.20 - 4.80 x10*3/uL Final    Monocytes Absolute 07/01/2024 0.40  0.10 - 1.00 x10*3/uL Final    Eosinophils Absolute 07/01/2024 0.17  0.00 - 0.70 x10*3/uL Final    Basophils Absolute 07/01/2024 0.07  0.00 - 0.10 x10*3/uL Final          IMPRESSION/PLAN    Left sided pT1cN1 ER/TX+ & HER2 neg low clinical risk/low molecular risk breast cancer. Proceed with zometa today. She will then start exemestane next wk to give some time between zometa and exemestane in case she has some other drug reaction. RTC in about 3-4 months with yrn key.       Moderate osteopenia w/jf T of -1.8 (left femur neck).  Would recommend adjuvant bisphosphonates given she will need 5-7 yrs of AI. Start zometa today      We discussed the clinical significance of diagnosis, goals of care and  treatment plan in detail.     I personally spent over half of a total 25 minutes face to face with the patient in counseling and discussion and/or coordination of care as described above.         -------------------------------------------------------------------------------------------------------------------------------  Rafal Ball MD  Director of Breast Cancer Medical Oncology Research Program   [facility]  Professor of Medicine  [place] [id number],  [address] [place] [id number]  Phone: [phone]  [email or web address]

## 2024-07-08 DIAGNOSIS — J45.909 ASTHMA, UNSPECIFIED ASTHMA SEVERITY, UNSPECIFIED WHETHER COMPLICATED, UNSPECIFIED WHETHER PERSISTENT (HHS-HCC): ICD-10-CM

## 2024-07-08 RX ORDER — FLUTICASONE FUROATE, UMECLIDINIUM BROMIDE AND VILANTEROL TRIFENATATE 200; 62.5; 25 UG/1; UG/1; UG/1
POWDER RESPIRATORY (INHALATION)
Qty: 60 EACH | Refills: 0 | Status: SHIPPED | OUTPATIENT
Start: 2024-07-08

## 2024-07-10 ENCOUNTER — APPOINTMENT (OUTPATIENT)
Dept: PRIMARY CARE | Facility: CLINIC | Age: 69
End: 2024-07-10
Payer: MEDICARE

## 2024-07-17 DIAGNOSIS — I10 PRIMARY HYPERTENSION: ICD-10-CM

## 2024-07-19 RX ORDER — AMLODIPINE BESYLATE 5 MG/1
5 TABLET ORAL
Qty: 90 TABLET | Refills: 0 | OUTPATIENT
Start: 2024-07-19

## 2024-07-25 ENCOUNTER — APPOINTMENT (OUTPATIENT)
Dept: PRIMARY CARE | Facility: CLINIC | Age: 69
End: 2024-07-25
Payer: MEDICARE

## 2024-07-25 VITALS
BODY MASS INDEX: 39.01 KG/M2 | OXYGEN SATURATION: 96 % | HEART RATE: 70 BPM | DIASTOLIC BLOOD PRESSURE: 76 MMHG | HEIGHT: 62 IN | SYSTOLIC BLOOD PRESSURE: 112 MMHG | WEIGHT: 212 LBS

## 2024-07-25 DIAGNOSIS — J45.909 ASTHMA, UNSPECIFIED ASTHMA SEVERITY, UNSPECIFIED WHETHER COMPLICATED, UNSPECIFIED WHETHER PERSISTENT (HHS-HCC): ICD-10-CM

## 2024-07-25 DIAGNOSIS — I10 PRIMARY HYPERTENSION: Primary | ICD-10-CM

## 2024-07-25 PROBLEM — H04.123 DRY EYES: Status: ACTIVE | Noted: 2024-07-25

## 2024-07-25 RX ORDER — AMLODIPINE BESYLATE 5 MG/1
5 TABLET ORAL DAILY
Qty: 30 TABLET | Refills: 0 | OUTPATIENT
Start: 2024-07-25

## 2024-07-25 RX ORDER — CETIRIZINE HYDROCHLORIDE 10 MG/1
1 TABLET ORAL
COMMUNITY
Start: 2024-05-22

## 2024-07-25 RX ORDER — AMLODIPINE BESYLATE 5 MG/1
5 TABLET ORAL
Qty: 90 TABLET | Refills: 3 | Status: SHIPPED | OUTPATIENT
Start: 2024-07-25 | End: 2025-07-25

## 2024-07-25 RX ORDER — EXEMESTANE 25 MG/1
TABLET ORAL
COMMUNITY
Start: 2024-07-15

## 2024-07-25 NOTE — PROGRESS NOTES
68-year-old female sent to establish care, follow-up on chronic conditions.    HTN  Stable, tolerates current regimen well.  Needs refills.    Asthma  Stable, tolerates current regimen well.    Breast cancer  Following with oncology, status postlumpectomy and radiation.  Doing well.    12 point ROS reviewed and negative other than as stated in HPI    General: Alert, oriented, pleasant, in no acute distress  HEENT:      Head: normocephalic, atraumatic;      eyes: EOMI, no scleral icterus;   CV: Heart with regular rate and rhythm, normal S1/S2, no murmurs  Lungs: CTAB without wheezing, rhonchi or rales; good respiratory effort, no increased work of breathing  Neuro: Cranial nerves grossly intact; alert and oriented  Psych: Appropriate mood and affect    #HTN  - At goal in office  - Continue grams daily, losartan 100 mg grams metoprolol tartrate 50 mg twice daily    #Asthma  -Continue Trelegy 200-60 2.5-25 mg, montelukast 10 mg daily Rochelle    #Breast cancer  -S/p lumpectomy, radiation, currently on exemestane  -Following with oncology    4 months, Medicare at that time    Christopher D'Amico, DO

## 2024-07-29 ENCOUNTER — APPOINTMENT (OUTPATIENT)
Dept: DERMATOLOGY | Facility: CLINIC | Age: 69
End: 2024-07-29
Payer: MEDICARE

## 2024-08-02 ENCOUNTER — TELEPHONE (OUTPATIENT)
Dept: RADIATION ONCOLOGY | Facility: HOSPITAL | Age: 69
End: 2024-08-02
Payer: MEDICARE

## 2024-08-02 NOTE — TELEPHONE ENCOUNTER
Called pt to remind of appointment on 8/5/2024  at 9:00 Pt's phone went to voicemail left number if needs to reschedule.        Luis Antonio Centeno MA

## 2024-08-02 NOTE — PROGRESS NOTES
Radiation Oncology Follow-Up    Patient Name:  Betty Mendieta  MRN:  [id number]  :  1955    Referring Provider: Ruby Luis MD  Primary Care Provider: Rafal Ball MD  Care Team: Patient Care Team:  Rafal Ball MD as PCP - General (Hematology and Oncology)  Rafal Ball MD as Consulting Physician (Hematology and Oncology)  Yaya Taylor MD PhD as Radiation Oncologist (Radiation Oncology)  Rafal Ball MD as Consulting Physician (Hematology and Oncology)  Hilaria Murcia RN as Registered Nurse (Hematology and Oncology)    Date of Service: 2024    SUBJECTIVE  History of Present Illness:  Betty Mendieta is a 68 y.o. female who was previously seen at the [facility] Department of Radiation Oncology for follow up after receiving radiation treatment to the LEFT breast and nodes on CCTG 1119 trial.    Brief history as a reminder:  Patient has a history of benign breast biopsy on the right.     2021: Bilateral screening mammogram showed scattered fibroglandular tissue with a left focal asymmetry, BI-RADS 0.     2022: Mammogram and ultrasound of the left breast showed the asymmetry persisted.  It was 6 cm from the nipple and measured 5 x 3 x 5 mm BI-RADS Category 3.  Recommendation was for repeat mammogram and ultrasound in 6 months.     2023: Patient presented with right nipple discharge and a bilateral mammogram showed scattered fibroglandular tissue with a left breast mass showing an interval change and associated distortion BI-RADS Category 0.  An ultrasound was recommended.     : Ultrasound of the right subareolar area showed no suspicious abnormality.  Ultrasound of the left mass at the 6 o'clock position 5 cm from the nipple showed a 0.8 x 0.6 x 0.8 cm irregular mass with no suspicious left axillary lymph nodes.     2023: Biopsy of the left breast mass showed grade 1 invasive ductal  carcinoma, ER/CT 95% positive, HER2/thomas negative and a biopsy marker was placed at the time of the biopsy.     December 13, 2023: Patient underwent a left lumpectomy and sentinel lymph node biopsy with Dr. Jocelin Luis, final pathology showed a 1.1 cm grade 1 invasive ductal carcinoma, margins negative, 1 out of 1 lymph nodes positive with 2.8 mm of disease with microscopic KING, pT1 cN1a.  Oncotype Dx of 13.     Patient's case was discussed at multidisciplinary tumor board with recommendations for Oncotype testing to consider chemotherapy and refer to radiation oncology as the patient may be eligible for CCTG MA.39 trial     January 16, 2024: Patient was seen by Dr. Ball in medical oncology to discuss the role of systemic therapy.  Given the low Oncotype score of 13 chemotherapy was not recommended.    February 21st through March 13, 2024: Patient underwent adjuvant whole breast radiotherapy including to the comprehensive nodes as part of the C CTG MA.39 trial .  She received 42.56 Lala in 16 fractions and completed treatment without complication.    March 24: Patient began adjuvant endocrine therapy with anastrozole and Zometa.  She developed a rash for which anastrozole was discontinued.    July 1, 2024: Patient was seen by Dr. Ball in medical oncology.  Plan to switch to exemestane after Zometa.  Zometa will be used to treat her moderate osteopenia.    Interval history: Patient presents today for follow-up after receiving adjuvant radiation therapy.  She is currently feeling well with no long-term side effects from her radiation treatment.  She denies shortness of breath, fever, chest pain, rib pain, breast swelling, lymphedema, and is otherwise without complaint.    Treatment Rendered:   IMRT: Left Breast    Treatment Period Technique Fraction Dose Fractions Total Dose   Course 1 2/21/2024-3/13/2024  (days elapsed: 21)         BREAST_RNI_L 2/21/2024-3/13/2024 VMAT 266 / 266 cGy 16 / 16 4256 / 4,256 cGy      INTERVAL since treatment completion: 4-1/2 months    Current disease status: No evidence of disease (JANELLE)    Review of Systems:   Review of Systems   Constitutional: Negative.    HENT:  Negative.     Respiratory: Negative.     Cardiovascular: Negative.    Gastrointestinal: Negative.    Endocrine: Positive for hot flashes.   Genitourinary: Negative.     Musculoskeletal: Negative.    Skin:  Positive for itching.   Neurological: Negative.    Hematological: Negative.    Psychiatric/Behavioral: Negative.         Performance Status:   The Karnofsky performance scale today is 100, Fully active, able to carry on all pre-disease performed without restriction (ECOG equivalent 0).     OBJECTIVE  Vital Signs:  There were no vitals taken for this visit.  Physical Exam  Vitals and nursing note reviewed. Exam conducted with a chaperone present.   Constitutional:       Appearance: Normal appearance.   HENT:      Head: Normocephalic and atraumatic.   Eyes:      Extraocular Movements: Extraocular movements intact.   Cardiovascular:      Rate and Rhythm: Normal rate.   Pulmonary:      Effort: Pulmonary effort is normal.   Abdominal:      General: Abdomen is flat.      Palpations: Abdomen is soft.   Musculoskeletal:         General: Normal range of motion.      Cervical back: Normal range of motion.   Skin:     General: Skin is warm and dry.   Neurological:      General: No focal deficit present.      Mental Status: She is alert.   Psychiatric:         Mood and Affect: Mood normal.     Arm measurements as follows:  Right arm: Axillary circumference 47.5.  CHCF from the antecubital fossa to the axilla: 39 cm, antecubital fossa 28 cm, MCC from antecubital fossa to the wrist 26 cm, wrist to 17.5 cm    Left arm axillary circumference 45 cm, MCC from antecubital fossa to the axilla: 40.5 centimeters; antecubital fossa: 28.0 cm; MCC from antecubital fossa to the wrist: 25.0 cm; wrist 17.5 cm     Arm motion measurements,  right arm 150 degrees on the first temp 140 degrees on the second attempt.  In the left arm 92 degrees on the first attempt, 95 degrees on the second attempt    Breast examination: Bilateral breast examination in the seated and supine positions demonstrates a well-healed incision over the left breast with some mild hyperpigmentation diffusely over the entire left breast.  No masses or adenopathy appreciated bilaterally.    ASSESSMENT:   Betty Mendieta is a 68 y.o. female with Malignant neoplasm of lower-outer quadrant of left breast of female, estrogen receptor positive (Multi), Pathologic: Stage IA (pT1c, pN1a(sn), cM0, G1, ER+, AL+, HER2-).      Patient presents today for 4 and half month follow-up.  She has no evidence of disease recurrence at this time.  She has the expected side effects of radiation treatment with some mild decrease in her range of motion and stable circumferential arm measurements on the left side.  She has no late sequelae of radiation treatment other than some mild hyperpigmentation of the left breast.  She will follow-up in 6 months as per the trial protocol.  Patient was given every opportunity ask questions, which she did, and these were answered to her apparent satisfaction.  We will continue on follow-up and on endocrine therapy.    PLAN:    -Referral to Occupational Therapy to assess for breast edema, bra fitting, and range of motion exercises.  -Continue with exemestane and Zometa in the adjuvant setting for her ER positive breast cancer.  -Follow-up in 8 months (3 to 4 months after next medical oncology visit)  .  NCCN Guidelines were applicable to guide this patients treatment plan.  Yaya Taylor MD PhD       Over 40 minutes was spent in evaluating, counseling, and examining the patient. More than 50% of that time was spent in face to face discussion.

## 2024-08-05 ENCOUNTER — HOSPITAL ENCOUNTER (OUTPATIENT)
Dept: RADIATION ONCOLOGY | Facility: HOSPITAL | Age: 69
Setting detail: RADIATION/ONCOLOGY SERIES
Discharge: HOME | End: 2024-08-05
Payer: MEDICARE

## 2024-08-05 VITALS
BODY MASS INDEX: 40.45 KG/M2 | WEIGHT: 219.8 LBS | RESPIRATION RATE: 18 BRPM | TEMPERATURE: 96.8 F | OXYGEN SATURATION: 96 % | HEIGHT: 62 IN | SYSTOLIC BLOOD PRESSURE: 124 MMHG | HEART RATE: 90 BPM | DIASTOLIC BLOOD PRESSURE: 73 MMHG

## 2024-08-05 DIAGNOSIS — R21 RASH: ICD-10-CM

## 2024-08-05 DIAGNOSIS — Z79.811 AROMATASE INHIBITOR USE: ICD-10-CM

## 2024-08-05 DIAGNOSIS — C50.512 MALIGNANT NEOPLASM OF LOWER-OUTER QUADRANT OF LEFT BREAST OF FEMALE, ESTROGEN RECEPTOR POSITIVE (MULTI): ICD-10-CM

## 2024-08-05 DIAGNOSIS — Z85.3 ENCOUNTER FOR FOLLOW-UP SURVEILLANCE OF BREAST CANCER: Primary | ICD-10-CM

## 2024-08-05 DIAGNOSIS — Z08 ENCOUNTER FOR FOLLOW-UP SURVEILLANCE OF BREAST CANCER: Primary | ICD-10-CM

## 2024-08-05 DIAGNOSIS — M25.512 ACUTE PAIN OF LEFT SHOULDER: ICD-10-CM

## 2024-08-05 DIAGNOSIS — Z17.0 MALIGNANT NEOPLASM OF LOWER-OUTER QUADRANT OF LEFT BREAST OF FEMALE, ESTROGEN RECEPTOR POSITIVE (MULTI): ICD-10-CM

## 2024-08-05 DIAGNOSIS — I10 HYPERTENSION, UNSPECIFIED TYPE: ICD-10-CM

## 2024-08-05 DIAGNOSIS — E66.01 CLASS 2 SEVERE OBESITY WITH BODY MASS INDEX (BMI) OF 35 TO 39.9 WITH SERIOUS COMORBIDITY (MULTI): ICD-10-CM

## 2024-08-05 DIAGNOSIS — R53.83 FATIGUE, UNSPECIFIED TYPE: ICD-10-CM

## 2024-08-05 PROCEDURE — 99215 OFFICE O/P EST HI 40 MIN: CPT | Performed by: RADIOLOGY

## 2024-08-05 RX ORDER — HYDROCORTISONE 25 MG/G
CREAM TOPICAL 2 TIMES DAILY
Qty: 28 G | Refills: 1 | Status: SHIPPED | OUTPATIENT
Start: 2024-08-05

## 2024-08-05 SDOH — HEALTH STABILITY: PHYSICAL HEALTH: ON AVERAGE, HOW MANY DAYS PER WEEK DO YOU ENGAGE IN MODERATE TO STRENUOUS EXERCISE (LIKE A BRISK WALK)?: 3 DAYS

## 2024-08-05 SDOH — HEALTH STABILITY: PHYSICAL HEALTH: ON AVERAGE, HOW MANY MINUTES DO YOU ENGAGE IN EXERCISE AT THIS LEVEL?: 60 MIN

## 2024-08-05 ASSESSMENT — SOCIAL DETERMINANTS OF HEALTH (SDOH)
DO YOU BELONG TO ANY CLUBS OR ORGANIZATIONS SUCH AS CHURCH GROUPS UNIONS, FRATERNAL OR ATHLETIC GROUPS, OR SCHOOL GROUPS?: PATIENT UNABLE TO ANSWER
HOW OFTEN DO YOU ATTEND CHURCH OR RELIGIOUS SERVICES?: MORE THAN 4 TIMES PER YEAR
WITHIN THE LAST YEAR, HAVE YOU BEEN AFRAID OF YOUR PARTNER OR EX-PARTNER?: NO
IN A TYPICAL WEEK, HOW MANY TIMES DO YOU TALK ON THE PHONE WITH FAMILY, FRIENDS, OR NEIGHBORS?: MORE THAN THREE TIMES A WEEK
HOW OFTEN DO YOU ATTENT MEETINGS OF THE CLUB OR ORGANIZATION YOU BELONG TO?: MORE THAN 4 TIMES PER YEAR
WITHIN THE LAST YEAR, HAVE YOU BEEN KICKED, HIT, SLAPPED, OR OTHERWISE PHYSICALLY HURT BY YOUR PARTNER OR EX-PARTNER?: NO
HOW OFTEN DO YOU GET TOGETHER WITH FRIENDS OR RELATIVES?: MORE THAN THREE TIMES A WEEK
WITHIN THE LAST YEAR, HAVE YOU BEEN HUMILIATED OR EMOTIONALLY ABUSED IN OTHER WAYS BY YOUR PARTNER OR EX-PARTNER?: NO
HOW HARD IS IT FOR YOU TO PAY FOR THE VERY BASICS LIKE FOOD, HOUSING, MEDICAL CARE, AND HEATING?: NOT HARD AT ALL
ARE YOU MARRIED, WIDOWED, DIVORCED, SEPARATED, NEVER MARRIED, OR LIVING WITH A PARTNER?: PATIENT DECLINED
WITHIN THE LAST YEAR, HAVE TO BEEN RAPED OR FORCED TO HAVE ANY KIND OF SEXUAL ACTIVITY BY YOUR PARTNER OR EX-PARTNER?: NO

## 2024-08-05 ASSESSMENT — ENCOUNTER SYMPTOMS
HEMATOLOGIC/LYMPHATIC NEGATIVE: 1
NEUROLOGICAL NEGATIVE: 1
EYES NEGATIVE: 1
ENDOCRINE NEGATIVE: 1
HOT FLASHES: 1
MUSCULOSKELETAL NEGATIVE: 1
DEPRESSION: 0
GASTROINTESTINAL NEGATIVE: 1
RESPIRATORY NEGATIVE: 1
LOSS OF SENSATION IN FEET: 0
CONSTITUTIONAL NEGATIVE: 1
CARDIOVASCULAR NEGATIVE: 1
OCCASIONAL FEELINGS OF UNSTEADINESS: 0
PSYCHIATRIC NEGATIVE: 1

## 2024-08-05 ASSESSMENT — LIFESTYLE VARIABLES
AUDIT-C TOTAL SCORE: 0
HOW OFTEN DO YOU HAVE A DRINK CONTAINING ALCOHOL: NEVER
HOW MANY STANDARD DRINKS CONTAINING ALCOHOL DO YOU HAVE ON A TYPICAL DAY: PATIENT DOES NOT DRINK
SKIP TO QUESTIONS 9-10: 1
HOW OFTEN DO YOU HAVE SIX OR MORE DRINKS ON ONE OCCASION: NEVER

## 2024-08-05 ASSESSMENT — ACTIVITIES OF DAILY LIVING (ADL)
DRESSING YOURSELF: INDEPENDENT
TOILETING: INDEPENDENT
GROOMING: INDEPENDENT
HEARING - RIGHT EAR: FUNCTIONAL
FEEDING YOURSELF: INDEPENDENT
PATIENT'S MEMORY ADEQUATE TO SAFELY COMPLETE DAILY ACTIVITIES?: YES
ADEQUATE_TO_COMPLETE_ADL: YES
WALKS IN HOME: INDEPENDENT
HEARING - LEFT EAR: FUNCTIONAL
BATHING: INDEPENDENT

## 2024-08-05 ASSESSMENT — COLUMBIA-SUICIDE SEVERITY RATING SCALE - C-SSRS
2. HAVE YOU ACTUALLY HAD ANY THOUGHTS OF KILLING YOURSELF?: NO
6. HAVE YOU EVER DONE ANYTHING, STARTED TO DO ANYTHING, OR PREPARED TO DO ANYTHING TO END YOUR LIFE?: NO
1. IN THE PAST MONTH, HAVE YOU WISHED YOU WERE DEAD OR WISHED YOU COULD GO TO SLEEP AND NOT WAKE UP?: NO

## 2024-08-05 NOTE — PROGRESS NOTES
Radiation Oncology Nursing Note    Pain: The patient's current pain level was assessed.  They report currently having a pain of 0 out of 10.  They feel their pain is under control with the use of pain medications.    Review of Systems:  Review of Systems   Constitutional: Negative.    HENT:  Negative.     Eyes: Negative.    Respiratory: Negative.     Cardiovascular: Negative.    Gastrointestinal: Negative.    Endocrine: Negative.    Genitourinary: Negative.     Musculoskeletal: Negative.    Skin: Negative.    Neurological: Negative.    Hematological: Negative.    Psychiatric/Behavioral: Negative.

## 2024-08-07 ENCOUNTER — TELEPHONE (OUTPATIENT)
Dept: HEMATOLOGY/ONCOLOGY | Facility: CLINIC | Age: 69
End: 2024-08-07

## 2024-08-08 DIAGNOSIS — C50.512 MALIGNANT NEOPLASM OF LOWER-OUTER QUADRANT OF LEFT BREAST OF FEMALE, ESTROGEN RECEPTOR POSITIVE (MULTI): Primary | ICD-10-CM

## 2024-08-08 DIAGNOSIS — Z17.0 MALIGNANT NEOPLASM OF LOWER-OUTER QUADRANT OF LEFT BREAST OF FEMALE, ESTROGEN RECEPTOR POSITIVE (MULTI): Primary | ICD-10-CM

## 2024-08-08 RX ORDER — EXEMESTANE 25 MG/1
25 TABLET ORAL DAILY
Qty: 90 TABLET | Refills: 3 | Status: SHIPPED | OUTPATIENT
Start: 2024-08-08 | End: 2024-11-06

## 2024-08-09 ENCOUNTER — HOSPITAL ENCOUNTER (OUTPATIENT)
Dept: RADIATION ONCOLOGY | Facility: HOSPITAL | Age: 69
Setting detail: RADIATION/ONCOLOGY SERIES
End: 2024-08-09
Payer: MEDICARE

## 2024-08-12 DIAGNOSIS — J45.909 ASTHMA, UNSPECIFIED ASTHMA SEVERITY, UNSPECIFIED WHETHER COMPLICATED, UNSPECIFIED WHETHER PERSISTENT (HHS-HCC): ICD-10-CM

## 2024-08-12 RX ORDER — FLUTICASONE FUROATE, UMECLIDINIUM BROMIDE AND VILANTEROL TRIFENATATE 200; 62.5; 25 UG/1; UG/1; UG/1
POWDER RESPIRATORY (INHALATION)
Qty: 60 EACH | Refills: 0 | Status: SHIPPED | OUTPATIENT
Start: 2024-08-12

## 2024-09-09 NOTE — PROGRESS NOTES
Chief Complaint  Follow-up  Left breast cancer      History of Present Illness    Referring Provider: DORA mathew  PCP A Head    70 yo AA female here for FU  Left breast cancer  Here alone    History:  1) history of right breast excisional biopsy, benign  2) 2021. BL screening mammogram. Scattered FG tissue. Right breast negative. Left focal asymmetry BIRADS 0  3) 2022. Left MG/US. Left asymmetry persists.  Left US.  6:00N5, 5 x 3 x 5 mm mass BIRADS 3. Rec left MG/US 6 months  4) no FU until 2023. Right nipple dc  5) 2023. BL MG scattered FG tissue. Left breast mass with interval change and associated distortion.  BIRADS 0. Rec left US and right subareolar US  6) 10/16/2023. BL US.  Right subareolar US negative. Left 6:00N5, 0.8 x 0.6 x 0.8 cm irregular mass BIRADS 4. Left axillary US negative.   7) 2023. Left breast 6:00N5 biopsy, grade 1 IDC, ER/CT > 95% Her2 neg Open coil hydromark.   8) 2023 left PM/SLNB 1.1 cm grade 1 IDC, margins neg.  positive LN. 2.8 mm with KING pT1c pN1a margins neg  9) tumor board review oncotype DX, consider chemo, XRT, hormonal therapy   10) Oncotype 13.  No chemotherapy advised (tree)  11) completed radiation 2024 (rai)  CCTG NA 39  12) anastrozole-->exemestane and Zometa  13) 9/10/2024 BL MG BIRADS BIRADS 2      She presents today for FU  No concerns    Ob/gyn history:  Menarche 14  Menopause 55  , age of first delivery 28  5-10 yrs OCPs, no fertility treatments, no HRT    No family history of breast or ovarian cancer.     Review of systems  A comprehensive ROS was taken on the patient intake form and reviewed with the patient. This form is scanned into the electronic medical record.    Constitutional symptoms: Denies generalized fatigue. Denies weight change, fevers/chills, difficulty sleeping   Eyes: Denies double vision, glaucoma, cataracts.  Ear/nose/throat/mouth: Denies hearing changes, sore throat, sinus problems.  Cardiovascular: No  chest pain. Denies irregular heartbeat. Denies ankle swelling.  Respiratory: No wheezing, cough, or shortness of breath.  Gastrointestinal: No abdominal pain, No nausea/vomiting. No indigestion/heartburn. No change in bowel habits. No constipation or diarrhea.   Genitourinary: No urinary incontinence. No urinary frequency. No painful urination.  Musculoskeletal: No bone pain, no muscle pain, no joint pain.   Integumentary: No rash. No masses. No changes in moles. No easy bruising.  Neurological: No headaches. No tremors. No numbness/tingling.  Psychiatric: No anxiety. No depression.  Endocrine: No excessive thirst. Not too hot or too cold. Not tired or fatigued.   Hematological/lymphatic: No swollen glands or blood clotting problems. No bruising.     Physical exam  A chaperone was offered for all portions of the physical exam. The patient declined.     General appearance: appears stated age, alert and oriented x 3  Head: Normocephalic, atraumatic  Eyes: conjunctivae/corneas clear.  Ears: External ears are normal, hearing is grossly intact.  Lungs: normal breathing  Heart: regular   Abdomen: Soft, nontender, nondistended.  Neurologic: grossly normal  Lymph nodes: No cervical, supraclavicular or axillary lymphadenopathy bilaterally    Breast: A comprehensive breast exam was performed in the seated and supine positions. Breasts are symmetrical. Bilateral nipples are everted. There are no skin changes on arm maneuvers. Bra size: 42C   Right: no masses   Left: no masses. Healed incision. Radiation changes. Breast edema    Results  Imaging  All breast imaging personally reviewed by me.  See HPI    Pathology  11/1/2023. Left breast 6:00N5 biopsy, grade 1 IDC, ER/AZ > 95% Her2 neg Open coil hydromark.     Impression:   1) 70 yo AA female here w new left breast cancer cT1 cN0 grade 1 IDC ER/AZ positive Her2 neg. S/p left PM/SLNB pT1c pN1a margins neg.   Oncotype 13  Completed XRT  Exemestane  JANELLE  2) Co-morbidities include  asthma. Non-smoker  3) No family history of breast or ovarian cancer      Plan:   She is here alone.  Oncotype was low.  She completed radiation.  She is tolerating exemestane.  Clinical exam is normal.  There is some left breast posttreatment edema.  Bilateral mammogram today is negative for suspicious findings.  She is happy to hear this.  I offered PT OT for breast edema but she declined.  We discussed follow-up.  Bilateral mammogram is due in 1 year.  She can follow-up with the nurse practitioner after her mammogram.  She should call with any sooner concerns.

## 2024-09-10 ENCOUNTER — HOSPITAL ENCOUNTER (OUTPATIENT)
Dept: RADIOLOGY | Facility: HOSPITAL | Age: 69
Discharge: HOME | End: 2024-09-10
Payer: MEDICARE

## 2024-09-10 ENCOUNTER — OFFICE VISIT (OUTPATIENT)
Dept: SURGICAL ONCOLOGY | Facility: HOSPITAL | Age: 69
End: 2024-09-10
Payer: MEDICARE

## 2024-09-10 VITALS
OXYGEN SATURATION: 94 % | WEIGHT: 215.39 LBS | HEART RATE: 71 BPM | RESPIRATION RATE: 18 BRPM | SYSTOLIC BLOOD PRESSURE: 125 MMHG | BODY MASS INDEX: 36.77 KG/M2 | HEIGHT: 64 IN | DIASTOLIC BLOOD PRESSURE: 82 MMHG | TEMPERATURE: 98.4 F

## 2024-09-10 VITALS — WEIGHT: 212 LBS | HEIGHT: 64 IN | BODY MASS INDEX: 36.19 KG/M2

## 2024-09-10 DIAGNOSIS — Z17.0 MALIGNANT NEOPLASM OF LOWER-OUTER QUADRANT OF LEFT BREAST OF FEMALE, ESTROGEN RECEPTOR POSITIVE (MULTI): Primary | ICD-10-CM

## 2024-09-10 DIAGNOSIS — Z85.3 PERSONAL HISTORY OF BREAST CANCER: ICD-10-CM

## 2024-09-10 DIAGNOSIS — C50.512 MALIGNANT NEOPLASM OF LOWER-OUTER QUADRANT OF LEFT BREAST OF FEMALE, ESTROGEN RECEPTOR POSITIVE (MULTI): Primary | ICD-10-CM

## 2024-09-10 PROCEDURE — G0279 TOMOSYNTHESIS, MAMMO: HCPCS | Performed by: RADIOLOGY

## 2024-09-10 PROCEDURE — 3008F BODY MASS INDEX DOCD: CPT | Performed by: SURGERY

## 2024-09-10 PROCEDURE — 1036F TOBACCO NON-USER: CPT | Performed by: SURGERY

## 2024-09-10 PROCEDURE — 99214 OFFICE O/P EST MOD 30 MIN: CPT | Performed by: SURGERY

## 2024-09-10 PROCEDURE — 3079F DIAST BP 80-89 MM HG: CPT | Performed by: SURGERY

## 2024-09-10 PROCEDURE — 1159F MED LIST DOCD IN RCRD: CPT | Performed by: SURGERY

## 2024-09-10 PROCEDURE — 77066 DX MAMMO INCL CAD BI: CPT | Performed by: RADIOLOGY

## 2024-09-10 PROCEDURE — 1126F AMNT PAIN NOTED NONE PRSNT: CPT | Performed by: SURGERY

## 2024-09-10 PROCEDURE — 1160F RVW MEDS BY RX/DR IN RCRD: CPT | Performed by: SURGERY

## 2024-09-10 PROCEDURE — 3074F SYST BP LT 130 MM HG: CPT | Performed by: SURGERY

## 2024-09-10 PROCEDURE — 77062 BREAST TOMOSYNTHESIS BI: CPT

## 2024-09-10 ASSESSMENT — PAIN SCALES - GENERAL: PAINLEVEL: 0-NO PAIN

## 2024-09-10 NOTE — PATIENT INSTRUCTIONS
You will be due in 1 year with your annual mammogram and follow up visit with one of the [facility] nurse practitioners.

## 2024-09-26 DIAGNOSIS — J45.909 ASTHMA, UNSPECIFIED ASTHMA SEVERITY, UNSPECIFIED WHETHER COMPLICATED, UNSPECIFIED WHETHER PERSISTENT (HHS-HCC): ICD-10-CM

## 2024-09-26 RX ORDER — ALBUTEROL SULFATE 90 UG/1
2 INHALANT RESPIRATORY (INHALATION)
Qty: 18 G | Refills: 3 | Status: SHIPPED | OUTPATIENT
Start: 2024-09-26

## 2024-09-26 RX ORDER — MONTELUKAST SODIUM 10 MG/1
10 TABLET ORAL NIGHTLY
Qty: 90 TABLET | Refills: 1 | Status: SHIPPED | OUTPATIENT
Start: 2024-09-26

## 2024-10-02 DIAGNOSIS — I10 PRIMARY HYPERTENSION: ICD-10-CM

## 2024-10-02 RX ORDER — LOSARTAN POTASSIUM 100 MG/1
100 TABLET ORAL DAILY
Qty: 90 TABLET | Refills: 2 | Status: SHIPPED | OUTPATIENT
Start: 2024-10-02

## 2024-10-20 PROBLEM — Z51.81 ENCOUNTER FOR MONITORING BISPHOSPHONATE THERAPY: Status: ACTIVE | Noted: 2024-10-20

## 2024-10-20 PROBLEM — Z79.83 ENCOUNTER FOR MONITORING BISPHOSPHONATE THERAPY: Status: ACTIVE | Noted: 2024-10-20

## 2024-10-23 ENCOUNTER — TELEPHONE (OUTPATIENT)
Dept: HEMATOLOGY/ONCOLOGY | Facility: HOSPITAL | Age: 69
End: 2024-10-23
Payer: MEDICARE

## 2024-10-23 NOTE — TELEPHONE ENCOUNTER
Pt returned call- said she had a mammogram on 9/10 ordered by Dr. Luis and she saw her after. I transferred her to scheduling to reschedule her appt with Cindy. RN partner notified.

## 2024-10-28 DIAGNOSIS — I10 PRIMARY HYPERTENSION: ICD-10-CM

## 2024-10-28 RX ORDER — METOPROLOL TARTRATE 50 MG/1
50 TABLET ORAL 2 TIMES DAILY
Qty: 180 TABLET | Refills: 1 | Status: SHIPPED | OUTPATIENT
Start: 2024-10-28

## 2024-11-04 ENCOUNTER — OFFICE VISIT (OUTPATIENT)
Dept: HEMATOLOGY/ONCOLOGY | Facility: HOSPITAL | Age: 69
End: 2024-11-04
Payer: MEDICARE

## 2024-11-04 VITALS
BODY MASS INDEX: 36.13 KG/M2 | HEART RATE: 90 BPM | SYSTOLIC BLOOD PRESSURE: 137 MMHG | TEMPERATURE: 97.5 F | WEIGHT: 211.64 LBS | OXYGEN SATURATION: 94 % | HEIGHT: 64 IN | RESPIRATION RATE: 18 BRPM | DIASTOLIC BLOOD PRESSURE: 73 MMHG

## 2024-11-04 DIAGNOSIS — Z79.83 ENCOUNTER FOR MONITORING BISPHOSPHONATE THERAPY: ICD-10-CM

## 2024-11-04 DIAGNOSIS — Z79.811 AROMATASE INHIBITOR USE: ICD-10-CM

## 2024-11-04 DIAGNOSIS — C50.512 MALIGNANT NEOPLASM OF LOWER-OUTER QUADRANT OF LEFT BREAST OF FEMALE, ESTROGEN RECEPTOR POSITIVE: ICD-10-CM

## 2024-11-04 DIAGNOSIS — Z85.3 ENCOUNTER FOR FOLLOW-UP SURVEILLANCE OF BREAST CANCER: ICD-10-CM

## 2024-11-04 DIAGNOSIS — M85.80 OSTEOPENIA, UNSPECIFIED LOCATION: ICD-10-CM

## 2024-11-04 DIAGNOSIS — Z51.81 ENCOUNTER FOR MONITORING BISPHOSPHONATE THERAPY: ICD-10-CM

## 2024-11-04 DIAGNOSIS — Z17.0 MALIGNANT NEOPLASM OF LOWER-OUTER QUADRANT OF LEFT BREAST OF FEMALE, ESTROGEN RECEPTOR POSITIVE: ICD-10-CM

## 2024-11-04 DIAGNOSIS — Z08 ENCOUNTER FOR FOLLOW-UP SURVEILLANCE OF BREAST CANCER: ICD-10-CM

## 2024-11-04 DIAGNOSIS — Z78.0 MENOPAUSE: ICD-10-CM

## 2024-11-04 PROCEDURE — 3008F BODY MASS INDEX DOCD: CPT | Performed by: NURSE PRACTITIONER

## 2024-11-04 PROCEDURE — 3078F DIAST BP <80 MM HG: CPT | Performed by: NURSE PRACTITIONER

## 2024-11-04 PROCEDURE — 1126F AMNT PAIN NOTED NONE PRSNT: CPT | Performed by: NURSE PRACTITIONER

## 2024-11-04 PROCEDURE — 1159F MED LIST DOCD IN RCRD: CPT | Performed by: NURSE PRACTITIONER

## 2024-11-04 PROCEDURE — 3075F SYST BP GE 130 - 139MM HG: CPT | Performed by: NURSE PRACTITIONER

## 2024-11-04 PROCEDURE — 99213 OFFICE O/P EST LOW 20 MIN: CPT | Performed by: NURSE PRACTITIONER

## 2024-11-04 PROCEDURE — 1036F TOBACCO NON-USER: CPT | Performed by: NURSE PRACTITIONER

## 2024-11-04 PROCEDURE — 1160F RVW MEDS BY RX/DR IN RCRD: CPT | Performed by: NURSE PRACTITIONER

## 2024-11-04 RX ORDER — EXEMESTANE 25 MG/1
25 TABLET ORAL DAILY
Qty: 90 TABLET | Refills: 3 | Status: SHIPPED | OUTPATIENT
Start: 2024-11-04 | End: 2025-11-04

## 2024-11-04 ASSESSMENT — PAIN SCALES - GENERAL: PAINLEVEL_OUTOF10: 0-NO PAIN

## 2024-11-04 NOTE — PATIENT INSTRUCTIONS
Cindy SCHULTZ, CNP June 2025, can be the same 3rd Zometa with labs same day prior to our apt at the [facility] on the 1st floor.      2nd Zometa 12/16/24- do labs the same day at the 1st floor [facility]     Dr Yaya Taylor / Fanta Perez CNP  radiation follow up 2/5/25     Dr Luis breast / Terrie Gloria CNP surgery follow up with mammogram due in Sept 15 2025    PCP Dr D'Amico annually and as needed, 11/21/24     Please call [phone] with any questions or concerns prior to your next office visit.

## 2024-11-04 NOTE — PROGRESS NOTES
Breast Medical Oncology Clinic  Location: [facility]      BREAST CANCER DIAGNOSIS and HISTORY  Malignant neoplasm of lower-outer quadrant of left breast of female, estrogen receptor positive, Pathologic: Stage IA (pT1c, pN1a(sn), cM0, G1, ER+, AL+, HER2-)   Patient has a history of benign breast biopsy on the right.     November 26, 2021: Bilateral screening mammogram showed scattered fibroglandular tissue with a left focal asymmetry, BI-RADS 0.     January 20, 2022: Mammogram and ultrasound of the left breast showed the asymmetry persisted.  It was 6 cm from the nipple and measured 5 x 3 x 5 mm BI-RADS Category 3.  Recommendation was for repeat mammogram and ultrasound in 6 months.     September 26, 2023: Patient presented with right nipple discharge and a bilateral mammogram showed scattered fibroglandular tissue with a left breast mass showing an interval change and associated distortion BI-RADS Category 0.  An ultrasound was recommended.     October 6 2023: Ultrasound of the right subareolar area showed no suspicious abnormality.  Ultrasound of the left mass at the 6 o'clock position 5 cm from the nipple showed a 0.8 x 0.6 x 0.8 cm irregular mass with no suspicious left axillary lymph nodes.     November 1, 2023: Biopsy of the left breast mass showed grade 1 invasive ductal carcinoma, ER/AL 95% positive, HER2/thomas negative and a biopsy marker was placed at the time of the biopsy.     December 13, 2023: Patient underwent a left lumpectomy and sentinel lymph node biopsy with Dr. Jocelin Luis, final pathology showed a 1.1 cm grade 1 invasive ductal carcinoma, margins negative, 1 out of 1 lymph nodes positive with 2.8 mm of disease with microscopic KING, pT1 cN1a.  Specimen submitted for Oncotype      January 15, 2024: Patient was seen by Dr. Ball in medical oncology. Recommended 5-7 years anastrozole + Adjuvant bisphosphonate therapy. Oncotype 13 March 13 2024 Completion of Radiation therapy with CCGT 1119     5/4/24  Initiated on Anastrozole.     2024 Change to Exemestane due to rash with anastrozole        CURRENT THERAPY  Exemestane   Adjuvant zometa #/6, initiated on 24    HISTORY OF PRESENT ILLNESS    Betty Mendieta is a 69 y.o. woman who presents today for breast cancer follow up. Today I have reviewed with the patient I will be conducting a clinical physical breast exam. Patient has declined a second medical professional today during the exam as a chapperone.      She continues compliant on daily exemestane. She denies any new breast cancer concerns.     She denies any chest pain or breathing issues, no cough or sob.      She denies any vision changes or headache issues, dizziness, no falls. She reports age related balance issues.     She denies any new or unexplained bone aches or pains    She denies any skin lesions or masses, oral sores lesions or infections. She reports rare skin itching from exemestane and will use topical cream that happens great than monthly.     She reports a normal appetite and normal bowel movements, normal urination     She denies any issues with sleep or fatigue        Review of Systems  ROS 14 points performed, See HPI for exceptions            Past Medical History:  has a past medical history of Asthma, Breast cancer, left breast (Multi), Cataract, Diverticulosis, Essential (primary) hypertension (10/26/2018), GERD (gastroesophageal reflux disease), Ocular hypertension, unspecified eye (2018), Personal history of irradiation, Preglaucoma, unspecified, unspecified eye, Radiculopathy, lumbar region (2016), Unilateral primary osteoarthritis, left knee (2021), and Vitamin D deficiency.  Surgical History:   has a past surgical history that includes  section, classic; Tubal ligation; Breast biopsy (Right); Breast biopsy (Left, 2023); Total knee arthroplasty (Left); Total knee arthroplasty (Right); BI US guided breast localization left (Left, 2023); and  "Breast lumpectomy.  Social History:   reports that she has never smoked. She has never used smokeless tobacco. She reports that she does not drink alcohol and does not use drugs.    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  She lives with her grandson Zac (is 22). She is  since .      GYN:  2 adult children in [place]     Family History:    Family History   Problem Relation Name Age of Onset    Hypertension Mother      Asthma Father      Hypertension Father      Glaucoma Sister      Hypertension Sister      Stroke Brother       Family Oncology History:  Cancer-related family history is not on file.      OBJECTIVE    VS / Pain:  /73   Pulse 90   Temp 36.4 °C (97.5 °F) (Temporal)   Resp 18   Ht 1.626 m (5' 4.02\")   Wt 96 kg (211 lb 10.3 oz)   SpO2 94%   BMI 36.31 kg/m²   BSA: 2.08 meters squared   Pain Scale: 0    Performance Status:  The ECOG performance scale today is ECO- Fully active, able to carry on all pre-disease performance w/o restriction.    Physical Exam  Constitutional: Well developed, awake/alert/oriented x4, no distress, alert and cooperative  EYES: Sclera clear  ENMT: mucous membranes moist, no apparent injury, no lesions seen  Head/Neck: Neck supple, no apparent injury, thyroid without mass or tenderness, No JVD, trachea midline, no bruits  Respiratory / Thoracic: Patent airways, clear to all lobes, normal breath sounds with good chest expansion, thorax symmetric.  Cardiovascular: Regular, rate and rhythm, no murmurs, 2+ equal pulses of the extremities, normal auscultated S 1and S 2  GI: Nondistended, soft, non-tender, no rebound tenderness or guarding, no masses palpable, no organomegaly, +BS, no bruits  Musculoskeletal: ROM intact, no joint swelling, normal strength, no spinal tenderness  Extremities: normal extremities, no cyanosis edema, contusions or wounds, no clubbing  Neurological: alert and oriented x4, " intact senses, motor, response and reflexes, normal strength  Breast: s/p left lumpectomy  and radiation therapy. Mild skin thickening and hyperpigmentation due to radiation therapy. No palpable masses or lesions in either breast   Lymphatic: No cervical, supraclavicular, infraclavicular or axillary lymphadenopathy  Psychological: Appropriate and talkative mood and behavior  Skin: Warm and dry, no lesions, no rashes, no jaundice          Diagnostic Results        Study Result  Interpreted By: KALEB SIM MD  MRN: [id number]  Patient Name: CONNOR SMILEY    STUDY:  BONE DENSITY, DEXA 1 OR MORE SITES: AXIAL SKELETN9/25/2023 10:05 am    INDICATION:  screening. The patient is a 69 y/o year old F.    COMPARISON:  No comparison available.    ACCESSION NUMBER(S):  [id number]    ORDERING CLINICIAN:  RENZO BRYSON    TECHNIQUE:  BONE DENSITY, DEXA 1 OR MORE SITES: AXIAL SKELETN    FINDINGS:  SPINE L1-L4  Bone Mineral Density: 1.042  T-Score 0.0 Z-Score 1  Classification: Normal  Bone Mineral Density change vs baseline: Not reported  Bone Mineral Density change vs previous: Not reported    LEFT FEMUR -TOTAL  Bone Mineral Density: 0.807  T-Score -1.1 Z-Score -0.4  Classification: Osteopenia  Bone Mineral Density change vs baseline: Not reported  Bone Mineral Density change vs previous: Not reported    LEFT FEMUR -NECK  Bone Mineral Density: 0.648  T-Score -1.8 Z-Score -0.8  Classification: Osteopenia      World Health Organization (WHO) criteria for post-menopausal,   Women:  Normal: T-score at or above -1 SD  Osteopenia: T-score between -1 and -2.5 SD  Osteoporosis: T-score at or below -2.5 SD      10-year Fracture Risk:  Major Osteoporotic Fracture 4.1  Hip Fracture 0.5    Note: If no FRAX score is reported, it is because:  Some T-score for Spine Total or Hip Total or Femoral Neck at or below  -2.5    This exam was performed at [facility] on a [employer] [facility] Dexa Unit.      IMPRESSION:  DEXA:  According to World Health Organization criteria,  classification is  low bone mass (osteopenia)    Followup recommended in two years or sooner as clinically warranted.    All images and detailed analysis are available on the  Radiology  PACS.    MACRO:  None     Narrative & Impression   Interpreted By:  Donna Ramirez,   STUDY:  BI MAMMO BILATERAL DIAGNOSTIC TOMOSYNTHESIS;  9/10/2024 8:49 am      ACCESSION NUMBER(S):  [id number]      ORDERING CLINICIAN:  KANWAL GUERRA      INDICATION:  Left lumpectomy with radiation treatment. Remote benign excisional  right breast biopsy. 1st postoperative exam.      ,Z85.3 Personal history of malignant neoplasm of breast      COMPARISON:  Specimen x-ray 12/13/2023, bilateral mammograms 09/26/2023,  11/26/2021, 09/03/2019      FINDINGS:  MAMMOGRAPHY: 2D and tomosynthesis images were reviewed at 1 mm slice  thickness.      Density:  The breast tissue is heterogeneously dense, which may  obscure small masses.      New postsurgical scarring and surgical clips in the deep central  inferior left breast, the site of lumpectomy. New postsurgical  scarring overlying the left axilla. Moderate trabecular thickening  and skin thickening of the left breast consistent with radiation  treatment. Stable postsurgical scarring in the upper outer right  breast, remote site of benign breast biopsy. Stable bilateral round  and coarse calcifications. No suspicious masses or calcifications are  identified.          IMPRESSION:  New postoperative and postradiation changes, left breast. No  mammographic evidence of malignancy within either breast. Routine  postsurgical follow-up recommended.      BI-RADS CATEGORY:  BI-RADS Category:  2 Benign.  Recommendation:  Annual Screening.  Recommended Date:  1 Year.  Laterality:  Bilateral.         LABORATORY/PATHOLOGY DATA    No visits with results within 6 Week(s) from this visit.   Latest known visit with results is:   Lab on 07/01/2024   Component Date Value Ref  Range Status    Glucose 07/01/2024 138 (H)  74 - 99 mg/dL Final    Sodium 07/01/2024 142  136 - 145 mmol/L Final    Potassium 07/01/2024 3.8  3.5 - 5.3 mmol/L Final    Chloride 07/01/2024 105  98 - 107 mmol/L Final    Bicarbonate 07/01/2024 28  21 - 32 mmol/L Final    Anion Gap 07/01/2024 13  10 - 20 mmol/L Final    Urea Nitrogen 07/01/2024 12  6 - 23 mg/dL Final    Creatinine 07/01/2024 0.76  0.50 - 1.05 mg/dL Final    eGFR 07/01/2024 87  >60 mL/min/1.73m*2 Final    Calcium 07/01/2024 9.3  8.6 - 10.3 mg/dL Final    Albumin 07/01/2024 3.8  3.4 - 5.0 g/dL Final    Alkaline Phosphatase 07/01/2024 72  33 - 136 U/L Final    Total Protein 07/01/2024 6.7  6.4 - 8.2 g/dL Final    AST 07/01/2024 17  9 - 39 U/L Final    Bilirubin, Total 07/01/2024 0.4  0.0 - 1.2 mg/dL Final    ALT 07/01/2024 15  7 - 45 U/L Final    WBC 07/01/2024 4.7  4.4 - 11.3 x10*3/uL Final    nRBC 07/01/2024 0.0  0.0 - 0.0 /100 WBCs Final    RBC 07/01/2024 4.33  4.00 - 5.20 x10*6/uL Final    Hemoglobin 07/01/2024 12.7  12.0 - 16.0 g/dL Final    Hematocrit 07/01/2024 39.7  36.0 - 46.0 % Final    MCV 07/01/2024 92  80 - 100 fL Final    MCH 07/01/2024 29.3  26.0 - 34.0 pg Final    MCHC 07/01/2024 32.0  32.0 - 36.0 g/dL Final    RDW 07/01/2024 13.1  11.5 - 14.5 % Final    Platelets 07/01/2024 366  150 - 450 x10*3/uL Final    Neutrophils % 07/01/2024 73.1  40.0 - 80.0 % Final    Immature Granulocytes %, Automated 07/01/2024 0.2  0.0 - 0.9 % Final    Lymphocytes % 07/01/2024 13.1  13.0 - 44.0 % Final    Monocytes % 07/01/2024 8.5  2.0 - 10.0 % Final    Eosinophils % 07/01/2024 3.6  0.0 - 6.0 % Final    Basophils % 07/01/2024 1.5  0.0 - 2.0 % Final    Neutrophils Absolute 07/01/2024 3.45  1.20 - 7.70 x10*3/uL Final    Immature Granulocytes Absolute, Au* 07/01/2024 0.01  0.00 - 0.70 x10*3/uL Final    Lymphocytes Absolute 07/01/2024 0.62 (L)  1.20 - 4.80 x10*3/uL Final    Monocytes Absolute 07/01/2024 0.40  0.10 - 1.00 x10*3/uL Final    Eosinophils Absolute  07/01/2024 0.17  0.00 - 0.70 x10*3/uL Final    Basophils Absolute 07/01/2024 0.07  0.00 - 0.10 x10*3/uL Final          IMPRESSION/PLAN    Left sided pT1cN1 ER/NE+ & HER2 neg low clinical risk/low molecular risk breast cancer.   Continued on daily exemestane, is tolerating this well. Rx updated today for a full year    There is no evidence on clinical exam today for breast cancer recurrence. RTC June 2025, continued shared visits between Breast Surgery and Rad onc on clinical trial     Moderate osteopenia w/jf T of -1.8 (left femur neck).  Initiated adjuvant bisphosphonate with Zometa July 2024 given she will need 5-7 yrs of AI. Planned repeat testing late Fall 2025      General Health maintenance   PCP Dr D'Amico annually and as needed       At least 25 minutes of direct consultation was spent with the patient today reviewing her cancer care plan, educating and answering questions regarding ongoing follow up, greater than 50% in counseling and coordination of care          Thank you for the opportunity to be involved your care.   We discussed the clinical significance of diagnosis, goals of care and treatment plan in detail.   Please do not hesitate to reach out with any questions.         -------------------------------------------------------------------------------------------------------------------------------  Cindy Wyman MSN, APRN, FNP-C  [facility]  Division of Medical Oncology- Breast   Collaborating Physician Dr. Rafal Ball   [facility] Breast Disease Team   [place] [id number]  Phone: [phone]  Fax: [phone]  Available via Secure Chat    Confidential Peer Review Document-  Privilege  Privileged Pursuant to [place] Revised Code Section 2305.24, .25, .251 & .252

## 2024-11-21 ENCOUNTER — APPOINTMENT (OUTPATIENT)
Dept: PRIMARY CARE | Facility: CLINIC | Age: 69
End: 2024-11-21
Payer: MEDICARE

## 2024-11-21 ENCOUNTER — LAB (OUTPATIENT)
Dept: LAB | Facility: LAB | Age: 69
End: 2024-11-21
Payer: MEDICARE

## 2024-11-21 VITALS
DIASTOLIC BLOOD PRESSURE: 75 MMHG | SYSTOLIC BLOOD PRESSURE: 134 MMHG | BODY MASS INDEX: 35.85 KG/M2 | HEART RATE: 77 BPM | WEIGHT: 210 LBS | HEIGHT: 64 IN | OXYGEN SATURATION: 96 %

## 2024-11-21 DIAGNOSIS — I10 PRIMARY HYPERTENSION: Primary | ICD-10-CM

## 2024-11-21 DIAGNOSIS — Z17.0 MALIGNANT NEOPLASM OF LOWER-OUTER QUADRANT OF LEFT BREAST OF FEMALE, ESTROGEN RECEPTOR POSITIVE: ICD-10-CM

## 2024-11-21 DIAGNOSIS — Z00.00 WELLNESS EXAMINATION: ICD-10-CM

## 2024-11-21 DIAGNOSIS — C50.512 MALIGNANT NEOPLASM OF LOWER-OUTER QUADRANT OF LEFT BREAST OF FEMALE, ESTROGEN RECEPTOR POSITIVE: ICD-10-CM

## 2024-11-21 DIAGNOSIS — J45.909 ASTHMA, UNSPECIFIED ASTHMA SEVERITY, UNSPECIFIED WHETHER COMPLICATED, UNSPECIFIED WHETHER PERSISTENT (HHS-HCC): ICD-10-CM

## 2024-11-21 DIAGNOSIS — Z00.00 MEDICARE ANNUAL WELLNESS VISIT, SUBSEQUENT: ICD-10-CM

## 2024-11-21 DIAGNOSIS — I10 PRIMARY HYPERTENSION: ICD-10-CM

## 2024-11-21 DIAGNOSIS — M62.838 MUSCLE SPASM: ICD-10-CM

## 2024-11-21 DIAGNOSIS — E55.9 VITAMIN D DEFICIENCY: ICD-10-CM

## 2024-11-21 LAB
25(OH)D3 SERPL-MCNC: 42 NG/ML (ref 30–100)
TSH SERPL-ACNC: 0.98 MIU/L (ref 0.44–3.98)

## 2024-11-21 PROCEDURE — 3075F SYST BP GE 130 - 139MM HG: CPT | Performed by: STUDENT IN AN ORGANIZED HEALTH CARE EDUCATION/TRAINING PROGRAM

## 2024-11-21 PROCEDURE — 1124F ACP DISCUSS-NO DSCNMKR DOCD: CPT | Performed by: STUDENT IN AN ORGANIZED HEALTH CARE EDUCATION/TRAINING PROGRAM

## 2024-11-21 PROCEDURE — 83735 ASSAY OF MAGNESIUM: CPT

## 2024-11-21 PROCEDURE — 90662 IIV NO PRSV INCREASED AG IM: CPT | Performed by: STUDENT IN AN ORGANIZED HEALTH CARE EDUCATION/TRAINING PROGRAM

## 2024-11-21 PROCEDURE — G0439 PPPS, SUBSEQ VISIT: HCPCS | Performed by: STUDENT IN AN ORGANIZED HEALTH CARE EDUCATION/TRAINING PROGRAM

## 2024-11-21 PROCEDURE — 1159F MED LIST DOCD IN RCRD: CPT | Performed by: STUDENT IN AN ORGANIZED HEALTH CARE EDUCATION/TRAINING PROGRAM

## 2024-11-21 PROCEDURE — G0008 ADMIN INFLUENZA VIRUS VAC: HCPCS | Performed by: STUDENT IN AN ORGANIZED HEALTH CARE EDUCATION/TRAINING PROGRAM

## 2024-11-21 PROCEDURE — 99397 PER PM REEVAL EST PAT 65+ YR: CPT | Performed by: STUDENT IN AN ORGANIZED HEALTH CARE EDUCATION/TRAINING PROGRAM

## 2024-11-21 PROCEDURE — 3078F DIAST BP <80 MM HG: CPT | Performed by: STUDENT IN AN ORGANIZED HEALTH CARE EDUCATION/TRAINING PROGRAM

## 2024-11-21 PROCEDURE — 82306 VITAMIN D 25 HYDROXY: CPT

## 2024-11-21 PROCEDURE — 36415 COLL VENOUS BLD VENIPUNCTURE: CPT

## 2024-11-21 PROCEDURE — 84443 ASSAY THYROID STIM HORMONE: CPT

## 2024-11-21 PROCEDURE — 85027 COMPLETE CBC AUTOMATED: CPT

## 2024-11-21 PROCEDURE — 80053 COMPREHEN METABOLIC PANEL: CPT

## 2024-11-21 PROCEDURE — 82550 ASSAY OF CK (CPK): CPT

## 2024-11-21 PROCEDURE — 99214 OFFICE O/P EST MOD 30 MIN: CPT | Performed by: STUDENT IN AN ORGANIZED HEALTH CARE EDUCATION/TRAINING PROGRAM

## 2024-11-21 PROCEDURE — 1170F FXNL STATUS ASSESSED: CPT | Performed by: STUDENT IN AN ORGANIZED HEALTH CARE EDUCATION/TRAINING PROGRAM

## 2024-11-21 PROCEDURE — 3008F BODY MASS INDEX DOCD: CPT | Performed by: STUDENT IN AN ORGANIZED HEALTH CARE EDUCATION/TRAINING PROGRAM

## 2024-11-21 PROCEDURE — 1160F RVW MEDS BY RX/DR IN RCRD: CPT | Performed by: STUDENT IN AN ORGANIZED HEALTH CARE EDUCATION/TRAINING PROGRAM

## 2024-11-21 PROCEDURE — 80061 LIPID PANEL: CPT

## 2024-11-21 PROCEDURE — 1036F TOBACCO NON-USER: CPT | Performed by: STUDENT IN AN ORGANIZED HEALTH CARE EDUCATION/TRAINING PROGRAM

## 2024-11-21 RX ORDER — IPRATROPIUM BROMIDE AND ALBUTEROL SULFATE 2.5; .5 MG/3ML; MG/3ML
3 SOLUTION RESPIRATORY (INHALATION) 2 TIMES DAILY
Qty: 180 ML | Refills: 5 | Status: SHIPPED | OUTPATIENT
Start: 2024-11-21

## 2024-11-21 RX ORDER — LATANOPROST 50 UG/ML
SOLUTION/ DROPS OPHTHALMIC
COMMUNITY
Start: 2024-09-27

## 2024-11-21 RX ORDER — TIZANIDINE 2 MG/1
2 TABLET ORAL EVERY 6 HOURS PRN
Qty: 30 TABLET | Refills: 0 | Status: SHIPPED | OUTPATIENT
Start: 2024-11-21 | End: 2024-12-01

## 2024-11-21 ASSESSMENT — ACTIVITIES OF DAILY LIVING (ADL)
DOING_HOUSEWORK: INDEPENDENT
BATHING: INDEPENDENT
DRESSING: INDEPENDENT
GROCERY_SHOPPING: INDEPENDENT
MANAGING_FINANCES: INDEPENDENT
TAKING_MEDICATION: INDEPENDENT

## 2024-11-21 ASSESSMENT — ENCOUNTER SYMPTOMS
DEPRESSION: 0
LOSS OF SENSATION IN FEET: 0
OCCASIONAL FEELINGS OF UNSTEADINESS: 0

## 2024-11-21 NOTE — PROGRESS NOTES
69-year-old female presenting for follow-up on multiple concerns, Medicare annual wellness exam/CPE.    HTN  Stable, tolerates current regimen well.     Asthma  Stable, tolerates current regimen well.  Is having cost issues with Trelegy.    Muscle spasms  Has had in the past, started to get bad reaction from baclofen.  Has not had in a while, but has been acting up.  Reports routine exercise with walking 3 times a week, and moderate activity at work.  No formal stretching or strength training.    Breast cancer  Following with oncology, status postlumpectomy and radiation.  Doing well.    12 point ROS reviewed and negative other than as stated in HPI    General: Alert, oriented, pleasant, in no acute distress  HEENT:      Head: normocephalic, atraumatic;      eyes: EOMI, no scleral icterus;   CV: Heart with regular rate and rhythm, normal S1/S2, no murmurs  Lungs: CTAB without wheezing, rhonchi or rales; good respiratory effort, no increased work of breathing  Abdomen: Soft, nontender, nondistended  Extremities: No edema  Neuro: Cranial nerves grossly intact; alert and oriented  Psych: Appropriate mood and affect    # HM  -CBC, CMP, Lipid panel, Vit D, TSH with reflex T4  -Vaccines:       Flu: Given in office      Shingrix: Recommended, advised to go to local pharmacy      Pneumococcal: UTD      Tdap: Recommended, advised to go to local pharmacy  -Justo w/ radu: History of breast cancer, following with oncology, breast surgery  -Lung cancer screening with low-dose CT: Never smoker  -Colonoscopy: , 10-year plan  -Osteoporosis screenin2023    #HTN  - Essentially at goal in office  - Continue amlodipine 5 grams daily, losartan 100 mg grams metoprolol tartrate 50 mg twice daily  -Repeat CMP    #Asthma  -Continue Trelegy 200-60 2.5-25 mg, montelukast 10 mg daily DuoNebs  -Having cost issues with Trelegy, samples given, referral to [facility] pharmacist    #Muscle spasm  -High suspicion is deconditioning  - Will get CMP,  magnesium, CK  - Discussed stretching and strength training  - Can trial tizanidine 2 mg as needed    #Breast cancer  -S/p lumpectomy, radiation, currently on exemestane  -Following with oncology, breast surgery    6 months, sooner if indicated    Christopher D'Amico,

## 2024-11-22 LAB
ALBUMIN SERPL BCP-MCNC: 4.1 G/DL (ref 3.4–5)
ALP SERPL-CCNC: 75 U/L (ref 33–136)
ALT SERPL W P-5'-P-CCNC: 18 U/L (ref 7–45)
ANION GAP SERPL CALC-SCNC: 12 MMOL/L (ref 10–20)
AST SERPL W P-5'-P-CCNC: 25 U/L (ref 9–39)
BILIRUB SERPL-MCNC: 0.4 MG/DL (ref 0–1.2)
BUN SERPL-MCNC: 10 MG/DL (ref 6–23)
CALCIUM SERPL-MCNC: 9.3 MG/DL (ref 8.6–10.6)
CHLORIDE SERPL-SCNC: 106 MMOL/L (ref 98–107)
CHOLEST SERPL-MCNC: 186 MG/DL (ref 0–199)
CHOLESTEROL/HDL RATIO: 3.1
CK SERPL-CCNC: 350 U/L (ref 0–215)
CO2 SERPL-SCNC: 30 MMOL/L (ref 21–32)
CREAT SERPL-MCNC: 0.75 MG/DL (ref 0.5–1.05)
EGFRCR SERPLBLD CKD-EPI 2021: 87 ML/MIN/1.73M*2
ERYTHROCYTE [DISTWIDTH] IN BLOOD BY AUTOMATED COUNT: 13.2 % (ref 11.5–14.5)
GLUCOSE SERPL-MCNC: 86 MG/DL (ref 74–99)
HCT VFR BLD AUTO: 40.8 % (ref 36–46)
HDLC SERPL-MCNC: 60.6 MG/DL
HGB BLD-MCNC: 12.6 G/DL (ref 12–16)
LDLC SERPL CALC-MCNC: 110 MG/DL
MAGNESIUM SERPL-MCNC: 1.91 MG/DL (ref 1.6–2.4)
MCH RBC QN AUTO: 29.2 PG (ref 26–34)
MCHC RBC AUTO-ENTMCNC: 30.9 G/DL (ref 32–36)
MCV RBC AUTO: 95 FL (ref 80–100)
NON HDL CHOLESTEROL: 125 MG/DL (ref 0–149)
NRBC BLD-RTO: 0 /100 WBCS (ref 0–0)
PLATELET # BLD AUTO: 435 X10*3/UL (ref 150–450)
POTASSIUM SERPL-SCNC: 4.1 MMOL/L (ref 3.5–5.3)
PROT SERPL-MCNC: 6.5 G/DL (ref 6.4–8.2)
RBC # BLD AUTO: 4.31 X10*6/UL (ref 4–5.2)
SODIUM SERPL-SCNC: 144 MMOL/L (ref 136–145)
TRIGL SERPL-MCNC: 77 MG/DL (ref 0–149)
VLDL: 15 MG/DL (ref 0–40)
WBC # BLD AUTO: 5 X10*3/UL (ref 4.4–11.3)

## 2024-11-23 DIAGNOSIS — R74.8 ELEVATED CK: Primary | ICD-10-CM

## 2024-11-23 NOTE — RESULT ENCOUNTER NOTE
LDL mildly elevated at 110, good HDL at 60.6, mildly elevated ASCVD risk score at 10.5%, technically treatment is recommended over 7.5%.  Could consider low-dose statin, or we should work on diet, recommend Mediterranean diet.    Mildly elevated creatinine kinase, shows some muscle strain.  I do not think this is something like rhabdomyolysis, given low number and normal kidney function.  We should repeat labs and make sure that it is stable or declining.  Repeat lab ordered.    Remaining labs unremarkable
Alert and oriented to person, place and time

## 2024-12-04 ENCOUNTER — TELEPHONE (OUTPATIENT)
Dept: PRIMARY CARE | Facility: CLINIC | Age: 69
End: 2024-12-04
Payer: MEDICARE

## 2024-12-04 NOTE — TELEPHONE ENCOUNTER
Result Communication    Resulted Orders   CBC   Result Value Ref Range    WBC 5.0 4.4 - 11.3 x10*3/uL    nRBC 0.0 0.0 - 0.0 /100 WBCs    RBC 4.31 4.00 - 5.20 x10*6/uL    Hemoglobin 12.6 12.0 - 16.0 g/dL    Hematocrit 40.8 36.0 - 46.0 %    MCV 95 80 - 100 fL    MCH 29.2 26.0 - 34.0 pg    MCHC 30.9 (L) 32.0 - 36.0 g/dL    RDW 13.2 11.5 - 14.5 %    Platelets 435 150 - 450 x10*3/uL   Lipid Panel   Result Value Ref Range    Cholesterol 186 0 - 199 mg/dL      Comment:            Age      Desirable   Borderline High   High     0-19 Y     0 - 169       170 - 199     >/= 200    20-24 Y     0 - 189       190 - 224     >/= 225         >24 Y     0 - 199       200 - 239     >/= 240   **All ranges are based on fasting samples. Specific   therapeutic targets will vary based on patient-specific   cardiac risk.    Pediatric guidelines reference:Pediatrics 2011, 128(S5).Adult guidelines reference: NCEP ATPIII Guidelines,KIMMY 2001, 258:2486-97    Venipuncture immediately after or during the administration of Metamizole may lead to falsely low results. Testing should be performed immediately prior to Metamizole dosing.    HDL-Cholesterol 60.6 mg/dL      Comment:        Age       Very Low   Low     Normal    High    0-19 Y    < 35      < 40     40-45     ----  20-24 Y    ----     < 40      >45      ----        >24 Y      ----     < 40     40-60      >60      Cholesterol/HDL Ratio 3.1       Comment:        Ref Values  Desirable  < 3.4  High Risk  > 5.0    LDL Calculated 110 (H) <=99 mg/dL      Comment:                                  Near   Borderline      AGE      Desirable  Optimal    High     High     Very High     0-19 Y     0 - 109     ---    110-129   >/= 130     ----    20-24 Y     0 - 119     ---    120-159   >/= 160     ----      >24 Y     0 -  99   100-129  130-159   160-189     >/=190      VLDL 15 0 - 40 mg/dL    Triglycerides 77 0 - 149 mg/dL      Comment:      Age              Desirable        Borderline         High         Very High  SEX:B           mg/dL             mg/dL               mg/dL      mg/dL  <=14D                       ----               ----        ----  15D-365D                    ----               ----        ----  1Y-9Y           0-74               75-99             >=100       ----  10Y-19Y        0-89                            >=130       ----  20Y-24Y        0-114             115-149             >=150      ----  >= 25Y         0-149             150-199             200-499    >=500      Venipuncture immediately after or during the administration of Metamizole may lead to falsely low results. Testing should be performed immediately prior to Metamizole dosing.    Non HDL Cholesterol 125 0 - 149 mg/dL      Comment:            Age       Desirable   Borderline High   High     Very High     0-19 Y     0 - 119       120 - 144     >/= 145    >/= 160    20-24 Y     0 - 149       150 - 189     >/= 190      ----         >24 Y    30 mg/dL above LDL Cholesterol goal     Comprehensive Metabolic Panel   Result Value Ref Range    Glucose 86 74 - 99 mg/dL    Sodium 144 136 - 145 mmol/L    Potassium 4.1 3.5 - 5.3 mmol/L    Chloride 106 98 - 107 mmol/L    Bicarbonate 30 21 - 32 mmol/L    Anion Gap 12 10 - 20 mmol/L    Urea Nitrogen 10 6 - 23 mg/dL    Creatinine 0.75 0.50 - 1.05 mg/dL    eGFR 87 >60 mL/min/1.73m*2      Comment:      Calculations of estimated GFR are performed using the 2021 CKD-EPI Study Refit equation without the race variable for the IDMS-Traceable creatinine methods.  https://jasn.asnjournals.org/content/early/2021/09/22/ASN.1753230241    Calcium 9.3 8.6 - 10.6 mg/dL    Albumin 4.1 3.4 - 5.0 g/dL    Alkaline Phosphatase 75 33 - 136 U/L    Total Protein 6.5 6.4 - 8.2 g/dL    AST 25 9 - 39 U/L    Bilirubin, Total 0.4 0.0 - 1.2 mg/dL    ALT 18 7 - 45 U/L      Comment:      Patients treated with Sulfasalazine may generate falsely decreased results for ALT.   TSH with reflex to Free T4 if abnormal    Result Value Ref Range    Thyroid Stimulating Hormone 0.98 0.44 - 3.98 mIU/L    Narrative    TSH testing is performed using different testing methodology at [facility] than at other [facility] hospitals. Direct result comparisons should only be made within the same method.     Vitamin D 25-Hydroxy,Total (for eval of Vitamin D levels)   Result Value Ref Range    Vitamin D, 25-Hydroxy, Total 42 30 - 100 ng/mL    Narrative    Deficiency:         < 20   ng/ml  Insufficiency:      20-29  ng/ml  Sufficiency:         ng/ml  This assay accurately quantifies the sum of Vitamin D3, 25-Hydroxy and Vitamin D2,25-Hydroxy.   Magnesium   Result Value Ref Range    Magnesium 1.91 1.60 - 2.40 mg/dL   CK   Result Value Ref Range    Creatine Kinase 350 (H) 0 - 215 U/L       3:14 PM      Results were successfully communicated with the patient and they acknowledged their understanding.

## 2024-12-04 NOTE — TELEPHONE ENCOUNTER
----- Message from Christopher D'Amico sent at 11/23/2024 10:09 AM EST -----  LDL mildly elevated at 110, good HDL at 60.6, mildly elevated ASCVD risk score at 10.5%, technically treatment is recommended over 7.5%.  Could consider low-dose statin, or we should work on diet, recommend Mediterranean diet.    Mildly elevated creatinine kinase, shows some muscle strain.  I do not think this is something like rhabdomyolysis, given low number and normal kidney function.  We should repeat labs and make sure that it is stable or declining.  Repeat lab ordered.    Remaining labs unremarkable

## 2024-12-16 ENCOUNTER — APPOINTMENT (OUTPATIENT)
Dept: HEMATOLOGY/ONCOLOGY | Facility: HOSPITAL | Age: 69
End: 2024-12-16
Payer: MEDICARE

## 2024-12-23 ENCOUNTER — INFUSION (OUTPATIENT)
Dept: HEMATOLOGY/ONCOLOGY | Facility: HOSPITAL | Age: 69
End: 2024-12-23
Payer: MEDICARE

## 2024-12-23 VITALS
WEIGHT: 214.8 LBS | DIASTOLIC BLOOD PRESSURE: 77 MMHG | SYSTOLIC BLOOD PRESSURE: 125 MMHG | BODY MASS INDEX: 36.87 KG/M2 | HEART RATE: 78 BPM | TEMPERATURE: 98.1 F | RESPIRATION RATE: 18 BRPM | OXYGEN SATURATION: 99 %

## 2024-12-23 DIAGNOSIS — Z17.0 MALIGNANT NEOPLASM OF LOWER-OUTER QUADRANT OF LEFT BREAST OF FEMALE, ESTROGEN RECEPTOR POSITIVE: ICD-10-CM

## 2024-12-23 DIAGNOSIS — C50.512 MALIGNANT NEOPLASM OF LOWER-OUTER QUADRANT OF LEFT BREAST OF FEMALE, ESTROGEN RECEPTOR POSITIVE: ICD-10-CM

## 2024-12-23 LAB
ALBUMIN SERPL BCP-MCNC: 4.2 G/DL (ref 3.4–5)
ALP SERPL-CCNC: 64 U/L (ref 33–136)
ALT SERPL W P-5'-P-CCNC: 15 U/L (ref 7–45)
ANION GAP SERPL CALC-SCNC: 12 MMOL/L (ref 10–20)
AST SERPL W P-5'-P-CCNC: 18 U/L (ref 9–39)
BILIRUB SERPL-MCNC: 0.4 MG/DL (ref 0–1.2)
BUN SERPL-MCNC: 7 MG/DL (ref 6–23)
CALCIUM SERPL-MCNC: 9.6 MG/DL (ref 8.6–10.3)
CHLORIDE SERPL-SCNC: 106 MMOL/L (ref 98–107)
CO2 SERPL-SCNC: 28 MMOL/L (ref 21–32)
CREAT SERPL-MCNC: 0.72 MG/DL (ref 0.5–1.05)
EGFRCR SERPLBLD CKD-EPI 2021: >90 ML/MIN/1.73M*2
GLUCOSE SERPL-MCNC: 77 MG/DL (ref 74–99)
POTASSIUM SERPL-SCNC: 3.5 MMOL/L (ref 3.5–5.3)
PROT SERPL-MCNC: 6.8 G/DL (ref 6.4–8.2)
SODIUM SERPL-SCNC: 142 MMOL/L (ref 136–145)

## 2024-12-23 PROCEDURE — 80053 COMPREHEN METABOLIC PANEL: CPT

## 2024-12-23 PROCEDURE — 2500000004 HC RX 250 GENERAL PHARMACY W/ HCPCS (ALT 636 FOR OP/ED): Performed by: NURSE PRACTITIONER

## 2024-12-23 PROCEDURE — 96365 THER/PROPH/DIAG IV INF INIT: CPT | Mod: INF

## 2024-12-23 RX ORDER — EPINEPHRINE 0.3 MG/.3ML
0.3 INJECTION SUBCUTANEOUS EVERY 5 MIN PRN
OUTPATIENT
Start: 2025-06-02

## 2024-12-23 RX ORDER — HEPARIN 100 UNIT/ML
500 SYRINGE INTRAVENOUS AS NEEDED
OUTPATIENT
Start: 2024-12-23

## 2024-12-23 RX ORDER — HEPARIN SODIUM,PORCINE/PF 10 UNIT/ML
50 SYRINGE (ML) INTRAVENOUS AS NEEDED
OUTPATIENT
Start: 2024-12-23

## 2024-12-23 RX ORDER — ALBUTEROL SULFATE 0.83 MG/ML
3 SOLUTION RESPIRATORY (INHALATION) AS NEEDED
OUTPATIENT
Start: 2025-06-02

## 2024-12-23 RX ORDER — ZOLEDRONIC ACID 0.04 MG/ML
4 INJECTION, SOLUTION INTRAVENOUS ONCE
Status: COMPLETED | OUTPATIENT
Start: 2024-12-23 | End: 2024-12-23

## 2024-12-23 RX ORDER — DIPHENHYDRAMINE HYDROCHLORIDE 50 MG/ML
50 INJECTION INTRAMUSCULAR; INTRAVENOUS AS NEEDED
OUTPATIENT
Start: 2025-06-02

## 2024-12-23 RX ORDER — ZOLEDRONIC ACID 0.04 MG/ML
4 INJECTION, SOLUTION INTRAVENOUS ONCE
OUTPATIENT
Start: 2025-06-02

## 2024-12-23 RX ORDER — FAMOTIDINE 10 MG/ML
20 INJECTION INTRAVENOUS ONCE AS NEEDED
OUTPATIENT
Start: 2025-06-02

## 2024-12-23 ASSESSMENT — PAIN SCALES - GENERAL: PAINLEVEL_OUTOF10: 0-NO PAIN

## 2025-02-05 ENCOUNTER — HOSPITAL ENCOUNTER (OUTPATIENT)
Dept: RADIATION ONCOLOGY | Facility: HOSPITAL | Age: 70
Setting detail: RADIATION/ONCOLOGY SERIES
Discharge: HOME | End: 2025-02-05
Payer: MEDICARE

## 2025-02-05 ENCOUNTER — APPOINTMENT (OUTPATIENT)
Dept: RADIATION ONCOLOGY | Facility: HOSPITAL | Age: 70
End: 2025-02-05
Payer: MEDICARE

## 2025-02-05 VITALS
SYSTOLIC BLOOD PRESSURE: 124 MMHG | TEMPERATURE: 96.8 F | RESPIRATION RATE: 18 BRPM | OXYGEN SATURATION: 95 % | HEIGHT: 64 IN | WEIGHT: 208 LBS | BODY MASS INDEX: 35.51 KG/M2 | DIASTOLIC BLOOD PRESSURE: 80 MMHG | HEART RATE: 72 BPM

## 2025-02-05 DIAGNOSIS — Z17.0 MALIGNANT NEOPLASM OF LOWER-OUTER QUADRANT OF LEFT BREAST OF FEMALE, ESTROGEN RECEPTOR POSITIVE: Primary | ICD-10-CM

## 2025-02-05 DIAGNOSIS — C50.512 MALIGNANT NEOPLASM OF LOWER-OUTER QUADRANT OF LEFT BREAST OF FEMALE, ESTROGEN RECEPTOR POSITIVE: Primary | ICD-10-CM

## 2025-02-05 PROCEDURE — G2211 COMPLEX E/M VISIT ADD ON: HCPCS | Performed by: NURSE PRACTITIONER

## 2025-02-05 PROCEDURE — 99214 OFFICE O/P EST MOD 30 MIN: CPT | Performed by: NURSE PRACTITIONER

## 2025-02-05 ASSESSMENT — ENCOUNTER SYMPTOMS
HEMATOLOGIC/LYMPHATIC NEGATIVE: 1
NEUROLOGICAL NEGATIVE: 1
GASTROINTESTINAL NEGATIVE: 1
CARDIOVASCULAR NEGATIVE: 1
OCCASIONAL FEELINGS OF UNSTEADINESS: 0
ENDOCRINE NEGATIVE: 1
PSYCHIATRIC NEGATIVE: 1
CONSTITUTIONAL NEGATIVE: 1
MUSCULOSKELETAL NEGATIVE: 1
RESPIRATORY NEGATIVE: 1
LOSS OF SENSATION IN FEET: 0
DEPRESSION: 0

## 2025-02-05 ASSESSMENT — PAIN SCALES - GENERAL: PAINLEVEL_OUTOF10: 0-NO PAIN

## 2025-02-05 NOTE — PROGRESS NOTES
Radiation Oncology Follow-Up    Patient Name:  Betty Mendieta  MRN:  [id number]  :  1955    Referring Provider: Ruby Luis MD  Primary Care Provider: Christopher D'Amico, DO  Care Team: Patient Care Team:  Christopher D'Amico, DO as PCP - General (Family Medicine)  Rafal Ball MD as Consulting Physician (Hematology and Oncology)  Yaya Taylor MD PhD as Radiation Oncologist (Radiation Oncology)  Rafal Ball MD as Consulting Physician (Hematology and Oncology)  Hilaria Murcia RN as Registered Nurse (Hematology and Oncology)    Date of Service: 2025    SUBJECTIVE    Betty Mendieta is a 69 y.o. female with a history of a 1.1 cm grade 1 invasive ductal carcinoma, margins negative, 1 out of 1 lymph nodes positive with 2.8 mm of disease with microscopic KING, pT1 cN1a.  Oncotype Dx of 13. Patient received  radiation treatment to the LEFT breast and nodes on CCT 1119 trial.    Treatment Rendered:   IMRT: Left Breast    Treatment Period Technique Fraction Dose Fractions Total Dose   Course 1 2024-3/13/2024  (days elapsed: )         BREAST_RNI_L 2024-3/13/2024 VMAT 266 / 266 cGy  4256 / 4,256 cGy     Oncologic History:  Patient has a history of benign breast biopsy on the right.     2021: Bilateral screening mammogram showed scattered fibroglandular tissue with a left focal asymmetry, BI-RADS 0.     2022: Mammogram and ultrasound of the left breast showed the asymmetry persisted.  It was 6 cm from the nipple and measured 5 x 3 x 5 mm BI-RADS Category 3.  Recommendation was for repeat mammogram and ultrasound in 6 months.     2023: Patient presented with right nipple discharge and a bilateral mammogram showed scattered fibroglandular tissue with a left breast mass showing an interval change and associated distortion BI-RADS Category 0.  An ultrasound was recommended.     : Ultrasound of the right subareolar area showed no  suspicious abnormality.  Ultrasound of the left mass at the 6 o'clock position 5 cm from the nipple showed a 0.8 x 0.6 x 0.8 cm irregular mass with no suspicious left axillary lymph nodes.     November 1, 2023: Biopsy of the left breast mass showed grade 1 invasive ductal carcinoma, ER/CO 95% positive, HER2/thomas negative and a biopsy marker was placed at the time of the biopsy.     December 13, 2023: Patient underwent a left lumpectomy and sentinel lymph node biopsy with Dr. Jocelin Luis, final pathology showed a 1.1 cm grade 1 invasive ductal carcinoma, margins negative, 1 out of 1 lymph nodes positive with 2.8 mm of disease with microscopic KING, pT1 cN1a.  Oncotype Dx of 13.     Patient's case was discussed at multidisciplinary tumor board with recommendations for Oncotype testing to consider chemotherapy and refer to radiation oncology as the patient may be eligible for CCTG MA.39 trial     January 16, 2024: Patient was seen by Dr. Ball in medical oncology to discuss the role of systemic therapy.  Given the low Oncotype score of 13 chemotherapy was not recommended.    February 21st through March 13, 2024: Patient underwent adjuvant whole breast radiotherapy including to the comprehensive nodes as part of the C CTG MA.39 trial .  She received 42.56 Lala in 16 fractions and completed treatment without complication.    March 24: Patient began adjuvant endocrine therapy with anastrozole and Zometa.  She developed a rash for which anastrozole was discontinued.    July 1, 2024: Patient was seen by Dr. Ball in medical oncology.  Plan to switch to exemestane after Zometa.  Zometa will be used to treat her moderate osteopenia.    History of Present Illness:    Betty Mendieta is a 69 y.o. female who presents today for follow up 11 months s/p receiving radiation treatment to the LEFT breast and nodes as part of the  CCTG 1119 trial.  Patient is doing well. Energy baseline. Appetite good. Weight stable. Patient denies breast  "pain, new lumps, drainage or swelling. No difficulty with ROM. She was changed to exemestane after developing a rash on anastrozole. Tolerating well without any adverse effects. Patient currently receiving Zometa infusions. Tolerating well. Follow up with Cindy Wyman CNP in June and with Terrie Gloria CNP in September with mammogram.     Review of Systems:     Review of Systems   Constitutional: Negative.    HENT:  Negative.     Respiratory: Negative.     Cardiovascular: Negative.    Gastrointestinal: Negative.    Endocrine: Negative.    Genitourinary: Negative.     Musculoskeletal: Negative.    Skin: Negative.    Neurological: Negative.    Hematological: Negative.    Psychiatric/Behavioral: Negative.         Performance Status:   The Karnofsky performance scale today is 100, Fully active, able to carry on all pre-disease performed without restriction (ECOG equivalent 0).     OBJECTIVE  Vital Signs:  /80   Pulse 72   Temp 36 °C (96.8 °F) (Temporal)   Resp 18   Ht 1.626 m (5' 4\")   Wt 94.3 kg (208 lb)   SpO2 95%   BMI 35.70 kg/m²   Physical Exam  Vitals and nursing note reviewed. Exam conducted with a chaperone present.   Constitutional:       Appearance: Normal appearance.   HENT:      Head: Normocephalic and atraumatic.   Eyes:      Extraocular Movements: Extraocular movements intact.   Cardiovascular:      Rate and Rhythm: Normal rate.   Pulmonary:      Effort: Pulmonary effort is normal.   Chest:      Chest wall: No mass, swelling or tenderness.   Breasts:     Right: No swelling, bleeding, inverted nipple, mass, nipple discharge, skin change or tenderness.      Left: Skin change present. No swelling, bleeding, inverted nipple, mass, nipple discharge or tenderness.          Comments: Mild skin thickening and hyperpigmentation of left breast in RT field.   Abdominal:      General: Abdomen is flat.      Palpations: Abdomen is soft.   Musculoskeletal:         General: Normal range of motion.      " Cervical back: Normal range of motion.   Lymphadenopathy:      Upper Body:      Right upper body: No supraclavicular, axillary or pectoral adenopathy.      Left upper body: No supraclavicular, axillary or pectoral adenopathy.   Skin:     General: Skin is warm and dry.   Neurological:      General: No focal deficit present.      Mental Status: She is alert.   Psychiatric:         Mood and Affect: Mood normal.       Radiology:  BI mammo bilateral diagnostic tomosynthesis  Status: Final result     PACS Images     Show images for BI mammo bilateral diagnostic tomosynthesis  Signed by    Signed Time Phone Pager   Donna Ramirez MD 9/10/2024 09:00 [phone]     Exam Information    Status Exam Begun Exam Ended   Final 9/10/2024 08:15 9/10/2024 08:49     Study Result    Narrative & Impression   Interpreted By:  Donna Ramirez,   STUDY:  BI MAMMO BILATERAL DIAGNOSTIC TOMOSYNTHESIS;  9/10/2024 8:49 am      ACCESSION NUMBER(S):  [id number]      ORDERING CLINICIAN:  KANWAL GUERRA      INDICATION:  Left lumpectomy with radiation treatment. Remote benign excisional  right breast biopsy. 1st postoperative exam.      ,Z85.3 Personal history of malignant neoplasm of breast      COMPARISON:  Specimen x-ray 12/13/2023, bilateral mammograms 09/26/2023,  11/26/2021, 09/03/2019      FINDINGS:  MAMMOGRAPHY: 2D and tomosynthesis images were reviewed at 1 mm slice  thickness.      Density:  The breast tissue is heterogeneously dense, which may  obscure small masses.      New postsurgical scarring and surgical clips in the deep central  inferior left breast, the site of lumpectomy. New postsurgical  scarring overlying the left axilla. Moderate trabecular thickening  and skin thickening of the left breast consistent with radiation  treatment. Stable postsurgical scarring in the upper outer right  breast, remote site of benign breast biopsy. Stable bilateral round  and coarse calcifications. No suspicious masses or calcifications  are  identified.          IMPRESSION:  New postoperative and postradiation changes, left breast. No  mammographic evidence of malignancy within either breast. Routine  postsurgical follow-up recommended.      BI-RADS CATEGORY:  BI-RADS Category:  2 Benign.  Recommendation:  Annual Screening.  Recommended Date:  1 Year.  Laterality:  Bilateral.      For any future breast imaging appointments, please call [phone]  ([phone]).          MACRO:  None      Signed by: Donna Ramirez 9/10/2024 9:00 AM  Dictation workstation:   [id number]       ASSESSMENT:   Betty Mendieta is a 69 y.o. female with Malignant neoplasm of lower-outer quadrant of left breast of female, estrogen receptor positive, Pathologic: Stage IA (pT1c, pN1a(sn), cM0, G1, ER+, IA+, HER2-).  She is now 11 months s/p RT. Patient is doing well. No acute complaints related to treatment. Continue Exemestane, Zometa and follow up with med onc as scheduled. Continue imaging and follow up with surgical team as scheduled. Next mammogram due in September. Patient to return to clinic in 12 months unless needs to be seen sooner. Instructed to call with any questions or concerns.

## 2025-02-06 ENCOUNTER — APPOINTMENT (OUTPATIENT)
Dept: PHARMACY | Facility: HOSPITAL | Age: 70
End: 2025-02-06
Payer: MEDICARE

## 2025-02-07 ENCOUNTER — EDUCATION (OUTPATIENT)
Dept: HEMATOLOGY/ONCOLOGY | Facility: HOSPITAL | Age: 70
End: 2025-02-07
Payer: MEDICARE

## 2025-02-07 NOTE — RESEARCH NOTES
Research Note Follow Up Visit       Betty Mendieta was called today for 12 month follow up on [id number].  Follow up procedures completed per protocol except the arm volume and mobility measurements due to the fact that the subject confused the day she had follow and came in a week earlier on 2/5/2025.  The Rad/Onc clinic accommodated her by seeing her on 2/5/2025 and cancelling her appointment on 02/11/2025. The team was not notified that she was here so we missed seeing her in person. The confusion happened because Betty was scheduled to see BINU Perez CNP who no longer sees clients at [facility] as of 01/01/2025. Betty's appointment was changed to BINU Guy CNP on 02/11/2025 and was unaware of the change.  Patient is aware of continued follow up plan.  Going forward Betty will notify provider that she needs to be seen by her clinical trial team if we are absent. I  informed Betty that the team will also notify the provider a few days in advance that she needs to be seen as well.       Education Documentation  Treatment Plan and Schedule, taught by Hilaria Murcia RN at 2/7/2025 10:49 AM.  Learner: Patient  Readiness: Eager  Method: Explanation  Response: Verbalizes Understanding    Education Comments  No comments found.

## 2025-02-11 ENCOUNTER — APPOINTMENT (OUTPATIENT)
Dept: RADIATION ONCOLOGY | Facility: HOSPITAL | Age: 70
End: 2025-02-11
Payer: MEDICARE

## 2025-02-11 ENCOUNTER — APPOINTMENT (OUTPATIENT)
Dept: PHARMACY | Facility: HOSPITAL | Age: 70
End: 2025-02-11
Payer: MEDICARE

## 2025-04-10 DIAGNOSIS — J45.909 ASTHMA, UNSPECIFIED ASTHMA SEVERITY, UNSPECIFIED WHETHER COMPLICATED, UNSPECIFIED WHETHER PERSISTENT (HHS-HCC): ICD-10-CM

## 2025-04-10 RX ORDER — FLUTICASONE FUROATE, UMECLIDINIUM BROMIDE AND VILANTEROL TRIFENATATE 200; 62.5; 25 UG/1; UG/1; UG/1
1 POWDER RESPIRATORY (INHALATION) DAILY
Qty: 60 EACH | Refills: 2 | Status: SHIPPED | OUTPATIENT
Start: 2025-04-10

## 2025-04-14 DIAGNOSIS — M62.838 MUSCLE SPASM: ICD-10-CM

## 2025-04-14 RX ORDER — TIZANIDINE 2 MG/1
2 TABLET ORAL EVERY 6 HOURS PRN
Qty: 30 TABLET | Refills: 0 | Status: SHIPPED | OUTPATIENT
Start: 2025-04-14 | End: 2025-04-24

## 2025-04-28 ENCOUNTER — TELEPHONE (OUTPATIENT)
Dept: HEMATOLOGY/ONCOLOGY | Facility: CLINIC | Age: 70
End: 2025-04-28
Payer: MEDICARE

## 2025-04-28 DIAGNOSIS — C50.512 MALIGNANT NEOPLASM OF LOWER-OUTER QUADRANT OF LEFT BREAST OF FEMALE, ESTROGEN RECEPTOR POSITIVE: Primary | ICD-10-CM

## 2025-04-28 DIAGNOSIS — Z17.0 MALIGNANT NEOPLASM OF LOWER-OUTER QUADRANT OF LEFT BREAST OF FEMALE, ESTROGEN RECEPTOR POSITIVE: Primary | ICD-10-CM

## 2025-04-29 ENCOUNTER — SOCIAL WORK (OUTPATIENT)
Dept: CASE MANAGEMENT | Facility: HOSPITAL | Age: 70
End: 2025-04-29
Payer: MEDICARE

## 2025-04-29 DIAGNOSIS — C50.512 MALIGNANT NEOPLASM OF LOWER-OUTER QUADRANT OF LEFT BREAST OF FEMALE, ESTROGEN RECEPTOR POSITIVE: ICD-10-CM

## 2025-04-29 DIAGNOSIS — Z17.0 MALIGNANT NEOPLASM OF LOWER-OUTER QUADRANT OF LEFT BREAST OF FEMALE, ESTROGEN RECEPTOR POSITIVE: ICD-10-CM

## 2025-04-29 RX ORDER — EXEMESTANE 25 MG/1
25 TABLET ORAL DAILY
Qty: 90 TABLET | Refills: 3 | Status: SHIPPED | OUTPATIENT
Start: 2025-04-29 | End: 2025-05-01 | Stop reason: SDUPTHER

## 2025-04-29 NOTE — PROGRESS NOTES
Patient has changed insurance companies and as a result, the cost of her medications has increased. She is concerned about her exemestane which now costs her a large deductible plus $117.00.   I have provided her with two websites that may offer far less for her to pay: [facility] and the [facility] pharmacy., I have asked EDNA Bone to p[provide a prescription sot hat patient can obtain he medication thru one of these providers.    MAVIS Rios

## 2025-05-01 DIAGNOSIS — C50.512 MALIGNANT NEOPLASM OF LOWER-OUTER QUADRANT OF LEFT BREAST OF FEMALE, ESTROGEN RECEPTOR POSITIVE: ICD-10-CM

## 2025-05-01 DIAGNOSIS — Z17.0 MALIGNANT NEOPLASM OF LOWER-OUTER QUADRANT OF LEFT BREAST OF FEMALE, ESTROGEN RECEPTOR POSITIVE: ICD-10-CM

## 2025-05-01 RX ORDER — EXEMESTANE 25 MG/1
25 TABLET ORAL DAILY
Qty: 90 TABLET | Refills: 3 | Status: SHIPPED | OUTPATIENT
Start: 2025-05-01 | End: 2026-05-01

## 2025-05-01 RX ORDER — EXEMESTANE 25 MG/1
25 TABLET ORAL DAILY
Qty: 30 TABLET | Refills: 0 | Status: SHIPPED | OUTPATIENT
Start: 2025-05-01 | End: 2025-05-31

## 2025-05-01 NOTE — TELEPHONE ENCOUNTER
Reason for Conversation  Medication Problem (Patient states her deductible for Exemestane will now be $117.00. She would like a call back to discuss other options.   )        Disposition   No disposition on file.    Patient is calling to ask the status of the medication or if it needs to be changed?  She did try to go to the link she was told and it did not work.   [facility].  She filled out what she could.  She called insurance and they named 3 cancer meds and one is letrozole.  She is not sure if that would be covered.  She is wondering if that is an option.      Please advise    Message sent

## 2025-05-01 NOTE — TELEPHONE ENCOUNTER
Reason for Conversation  Medication Problem (Patient states her deductible for Exemestane will now be $117.00. She would like a call back to discuss other options.   )    Returned call to patient - She reports has been out of medication for about 2 weeks and is willing to wait to see if mail order will go through with [facility] plus but in the meantime will get the 30 day with [facility] from [facility] as discussed with Hilaria MTZ. She understands our team pharmacist will call [employer] tomorrow

## 2025-05-02 NOTE — TELEPHONE ENCOUNTER
Patient was able to fill 30 day at [facility] for $35. She is out of town for the weekend and will have her grandson help her troubleshoot the [employer] website and account set up.

## 2025-05-18 DIAGNOSIS — Z17.0 MALIGNANT NEOPLASM OF LOWER-OUTER QUADRANT OF LEFT BREAST OF FEMALE, ESTROGEN RECEPTOR POSITIVE: Primary | ICD-10-CM

## 2025-05-18 DIAGNOSIS — C50.512 MALIGNANT NEOPLASM OF LOWER-OUTER QUADRANT OF LEFT BREAST OF FEMALE, ESTROGEN RECEPTOR POSITIVE: Primary | ICD-10-CM

## 2025-05-22 ENCOUNTER — APPOINTMENT (OUTPATIENT)
Dept: PRIMARY CARE | Facility: CLINIC | Age: 70
End: 2025-05-22
Payer: MEDICARE

## 2025-05-22 VITALS
HEIGHT: 64 IN | SYSTOLIC BLOOD PRESSURE: 113 MMHG | DIASTOLIC BLOOD PRESSURE: 72 MMHG | BODY MASS INDEX: 35.34 KG/M2 | HEART RATE: 74 BPM | WEIGHT: 207 LBS | OXYGEN SATURATION: 94 %

## 2025-05-22 DIAGNOSIS — R25.2 MUSCLE CRAMPS: ICD-10-CM

## 2025-05-22 DIAGNOSIS — Z13.6 SCREENING FOR CARDIOVASCULAR CONDITION: ICD-10-CM

## 2025-05-22 DIAGNOSIS — J45.909 ASTHMA, UNSPECIFIED ASTHMA SEVERITY, UNSPECIFIED WHETHER COMPLICATED, UNSPECIFIED WHETHER PERSISTENT (HHS-HCC): ICD-10-CM

## 2025-05-22 DIAGNOSIS — C50.512 MALIGNANT NEOPLASM OF LOWER-OUTER QUADRANT OF LEFT BREAST OF FEMALE, ESTROGEN RECEPTOR POSITIVE: ICD-10-CM

## 2025-05-22 DIAGNOSIS — R74.8 ELEVATED CK: ICD-10-CM

## 2025-05-22 DIAGNOSIS — I10 PRIMARY HYPERTENSION: Primary | ICD-10-CM

## 2025-05-22 DIAGNOSIS — M62.838 MUSCLE SPASM: ICD-10-CM

## 2025-05-22 DIAGNOSIS — Z17.0 MALIGNANT NEOPLASM OF LOWER-OUTER QUADRANT OF LEFT BREAST OF FEMALE, ESTROGEN RECEPTOR POSITIVE: ICD-10-CM

## 2025-05-22 PROCEDURE — 3078F DIAST BP <80 MM HG: CPT | Performed by: STUDENT IN AN ORGANIZED HEALTH CARE EDUCATION/TRAINING PROGRAM

## 2025-05-22 PROCEDURE — G2211 COMPLEX E/M VISIT ADD ON: HCPCS | Performed by: STUDENT IN AN ORGANIZED HEALTH CARE EDUCATION/TRAINING PROGRAM

## 2025-05-22 PROCEDURE — 3008F BODY MASS INDEX DOCD: CPT | Performed by: STUDENT IN AN ORGANIZED HEALTH CARE EDUCATION/TRAINING PROGRAM

## 2025-05-22 PROCEDURE — 99214 OFFICE O/P EST MOD 30 MIN: CPT | Performed by: STUDENT IN AN ORGANIZED HEALTH CARE EDUCATION/TRAINING PROGRAM

## 2025-05-22 PROCEDURE — 3074F SYST BP LT 130 MM HG: CPT | Performed by: STUDENT IN AN ORGANIZED HEALTH CARE EDUCATION/TRAINING PROGRAM

## 2025-05-22 PROCEDURE — 1124F ACP DISCUSS-NO DSCNMKR DOCD: CPT | Performed by: STUDENT IN AN ORGANIZED HEALTH CARE EDUCATION/TRAINING PROGRAM

## 2025-05-22 PROCEDURE — 1036F TOBACCO NON-USER: CPT | Performed by: STUDENT IN AN ORGANIZED HEALTH CARE EDUCATION/TRAINING PROGRAM

## 2025-05-22 PROCEDURE — 1160F RVW MEDS BY RX/DR IN RCRD: CPT | Performed by: STUDENT IN AN ORGANIZED HEALTH CARE EDUCATION/TRAINING PROGRAM

## 2025-05-22 PROCEDURE — 1159F MED LIST DOCD IN RCRD: CPT | Performed by: STUDENT IN AN ORGANIZED HEALTH CARE EDUCATION/TRAINING PROGRAM

## 2025-05-22 RX ORDER — TIZANIDINE 2 MG/1
2 TABLET ORAL EVERY 6 HOURS PRN
Qty: 30 TABLET | Refills: 3 | Status: SHIPPED | OUTPATIENT
Start: 2025-05-22 | End: 2025-06-21

## 2025-05-22 RX ORDER — NEOMYCIN SULFATE, POLYMYXIN B SULFATE, AND DEXAMETHASONE 3.5; 10000; 1 MG/G; [USP'U]/G; MG/G
OINTMENT OPHTHALMIC
COMMUNITY
Start: 2025-03-25

## 2025-05-22 ASSESSMENT — PATIENT HEALTH QUESTIONNAIRE - PHQ9
2. FEELING DOWN, DEPRESSED OR HOPELESS: NOT AT ALL
1. LITTLE INTEREST OR PLEASURE IN DOING THINGS: NOT AT ALL
SUM OF ALL RESPONSES TO PHQ9 QUESTIONS 1 AND 2: 0

## 2025-05-22 ASSESSMENT — ENCOUNTER SYMPTOMS
OCCASIONAL FEELINGS OF UNSTEADINESS: 0
DEPRESSION: 0
LOSS OF SENSATION IN FEET: 0

## 2025-05-27 ENCOUNTER — SOCIAL WORK (OUTPATIENT)
Dept: CASE MANAGEMENT | Facility: HOSPITAL | Age: 70
End: 2025-05-27
Payer: MEDICARE

## 2025-05-27 NOTE — PROGRESS NOTES
SW received referral from SURJIT Morgan, PharmD and SUKUMAR Wyman, CNP asking to reach out to patient as she was not able to sign up for [employer] for exemestane. Patient has FUV and INF at [facility] on 6/2/25. SW asked [facility] BIBI Pena to meet with patient at the appt to assist. Patient and SW at [facility] agreeable.

## 2025-06-02 ENCOUNTER — LAB (OUTPATIENT)
Dept: LAB | Facility: HOSPITAL | Age: 70
End: 2025-06-02
Payer: MEDICARE

## 2025-06-02 ENCOUNTER — INFUSION (OUTPATIENT)
Dept: HEMATOLOGY/ONCOLOGY | Facility: HOSPITAL | Age: 70
End: 2025-06-02
Payer: MEDICARE

## 2025-06-02 ENCOUNTER — OFFICE VISIT (OUTPATIENT)
Dept: HEMATOLOGY/ONCOLOGY | Facility: HOSPITAL | Age: 70
End: 2025-06-02
Payer: MEDICARE

## 2025-06-02 ENCOUNTER — SOCIAL WORK (OUTPATIENT)
Dept: CASE MANAGEMENT | Facility: HOSPITAL | Age: 70
End: 2025-06-02

## 2025-06-02 VITALS
HEIGHT: 64 IN | RESPIRATION RATE: 20 BRPM | HEART RATE: 92 BPM | BODY MASS INDEX: 36.07 KG/M2 | DIASTOLIC BLOOD PRESSURE: 78 MMHG | SYSTOLIC BLOOD PRESSURE: 128 MMHG | TEMPERATURE: 97.3 F | OXYGEN SATURATION: 97 % | WEIGHT: 211.3 LBS

## 2025-06-02 DIAGNOSIS — Z85.3 ENCOUNTER FOR FOLLOW-UP SURVEILLANCE OF BREAST CANCER: ICD-10-CM

## 2025-06-02 DIAGNOSIS — M85.80 OSTEOPENIA, UNSPECIFIED LOCATION: ICD-10-CM

## 2025-06-02 DIAGNOSIS — Z78.0 MENOPAUSE: ICD-10-CM

## 2025-06-02 DIAGNOSIS — Z51.81 ENCOUNTER FOR MONITORING BISPHOSPHONATE THERAPY: ICD-10-CM

## 2025-06-02 DIAGNOSIS — Z17.0 MALIGNANT NEOPLASM OF LOWER-OUTER QUADRANT OF LEFT BREAST OF FEMALE, ESTROGEN RECEPTOR POSITIVE: ICD-10-CM

## 2025-06-02 DIAGNOSIS — Z79.83 ENCOUNTER FOR MONITORING BISPHOSPHONATE THERAPY: ICD-10-CM

## 2025-06-02 DIAGNOSIS — C50.512 MALIGNANT NEOPLASM OF LOWER-OUTER QUADRANT OF LEFT BREAST OF FEMALE, ESTROGEN RECEPTOR POSITIVE: ICD-10-CM

## 2025-06-02 DIAGNOSIS — Z08 ENCOUNTER FOR FOLLOW-UP SURVEILLANCE OF BREAST CANCER: ICD-10-CM

## 2025-06-02 DIAGNOSIS — R74.8 ELEVATED CK: ICD-10-CM

## 2025-06-02 DIAGNOSIS — Z79.811 AROMATASE INHIBITOR USE: ICD-10-CM

## 2025-06-02 LAB
ALBUMIN SERPL BCP-MCNC: 4 G/DL (ref 3.4–5)
ALP SERPL-CCNC: 69 U/L (ref 33–136)
ALT SERPL W P-5'-P-CCNC: 15 U/L (ref 7–45)
ANION GAP SERPL CALC-SCNC: 11 MMOL/L (ref 10–20)
AST SERPL W P-5'-P-CCNC: 18 U/L (ref 9–39)
BILIRUB SERPL-MCNC: 0.4 MG/DL (ref 0–1.2)
BUN SERPL-MCNC: 11 MG/DL (ref 6–23)
CALCIUM SERPL-MCNC: 9.7 MG/DL (ref 8.6–10.3)
CHLORIDE SERPL-SCNC: 106 MMOL/L (ref 98–107)
CK SERPL-CCNC: 311 U/L (ref 0–215)
CO2 SERPL-SCNC: 28 MMOL/L (ref 21–32)
CREAT SERPL-MCNC: 0.78 MG/DL (ref 0.5–1.05)
EGFRCR SERPLBLD CKD-EPI 2021: 83 ML/MIN/1.73M*2
ERYTHROCYTE [DISTWIDTH] IN BLOOD BY AUTOMATED COUNT: 13.5 % (ref 11.5–14.5)
GLUCOSE SERPL-MCNC: 126 MG/DL (ref 74–99)
HCT VFR BLD AUTO: 38.5 % (ref 36–46)
HGB BLD-MCNC: 12.4 G/DL (ref 12–16)
MCH RBC QN AUTO: 29.9 PG (ref 26–34)
MCHC RBC AUTO-ENTMCNC: 32.2 G/DL (ref 32–36)
MCV RBC AUTO: 93 FL (ref 80–100)
NRBC BLD-RTO: 0 /100 WBCS (ref 0–0)
PLATELET # BLD AUTO: 374 X10*3/UL (ref 150–450)
POTASSIUM SERPL-SCNC: 4 MMOL/L (ref 3.5–5.3)
PROT SERPL-MCNC: 6.8 G/DL (ref 6.4–8.2)
RBC # BLD AUTO: 4.15 X10*6/UL (ref 4–5.2)
SODIUM SERPL-SCNC: 141 MMOL/L (ref 136–145)
WBC # BLD AUTO: 5.2 X10*3/UL (ref 4.4–11.3)

## 2025-06-02 PROCEDURE — 99214 OFFICE O/P EST MOD 30 MIN: CPT | Performed by: NURSE PRACTITIONER

## 2025-06-02 PROCEDURE — 85027 COMPLETE CBC AUTOMATED: CPT | Performed by: STUDENT IN AN ORGANIZED HEALTH CARE EDUCATION/TRAINING PROGRAM

## 2025-06-02 PROCEDURE — 1036F TOBACCO NON-USER: CPT | Performed by: NURSE PRACTITIONER

## 2025-06-02 PROCEDURE — 1126F AMNT PAIN NOTED NONE PRSNT: CPT | Performed by: NURSE PRACTITIONER

## 2025-06-02 PROCEDURE — 80053 COMPREHEN METABOLIC PANEL: CPT

## 2025-06-02 PROCEDURE — 2500000004 HC RX 250 GENERAL PHARMACY W/ HCPCS (ALT 636 FOR OP/ED): Performed by: NURSE PRACTITIONER

## 2025-06-02 PROCEDURE — 82550 ASSAY OF CK (CPK): CPT

## 2025-06-02 PROCEDURE — 36415 COLL VENOUS BLD VENIPUNCTURE: CPT

## 2025-06-02 PROCEDURE — 3078F DIAST BP <80 MM HG: CPT | Performed by: NURSE PRACTITIONER

## 2025-06-02 PROCEDURE — 3074F SYST BP LT 130 MM HG: CPT | Performed by: NURSE PRACTITIONER

## 2025-06-02 PROCEDURE — 96365 THER/PROPH/DIAG IV INF INIT: CPT | Mod: INF

## 2025-06-02 PROCEDURE — 3008F BODY MASS INDEX DOCD: CPT | Performed by: NURSE PRACTITIONER

## 2025-06-02 PROCEDURE — 1159F MED LIST DOCD IN RCRD: CPT | Performed by: NURSE PRACTITIONER

## 2025-06-02 PROCEDURE — 1160F RVW MEDS BY RX/DR IN RCRD: CPT | Performed by: NURSE PRACTITIONER

## 2025-06-02 RX ORDER — FAMOTIDINE 10 MG/ML
20 INJECTION, SOLUTION INTRAVENOUS ONCE AS NEEDED
Status: DISCONTINUED | OUTPATIENT
Start: 2025-06-02 | End: 2025-06-02 | Stop reason: HOSPADM

## 2025-06-02 RX ORDER — FAMOTIDINE 10 MG/ML
20 INJECTION, SOLUTION INTRAVENOUS ONCE AS NEEDED
OUTPATIENT
Start: 2025-06-08

## 2025-06-02 RX ORDER — ZOLEDRONIC ACID 0.04 MG/ML
4 INJECTION, SOLUTION INTRAVENOUS ONCE
Status: COMPLETED | OUTPATIENT
Start: 2025-06-02 | End: 2025-06-02

## 2025-06-02 RX ORDER — ALBUTEROL SULFATE 0.83 MG/ML
3 SOLUTION RESPIRATORY (INHALATION) AS NEEDED
Status: DISCONTINUED | OUTPATIENT
Start: 2025-06-02 | End: 2025-06-02 | Stop reason: HOSPADM

## 2025-06-02 RX ORDER — HEPARIN SODIUM,PORCINE/PF 10 UNIT/ML
50 SYRINGE (ML) INTRAVENOUS AS NEEDED
OUTPATIENT
Start: 2025-06-02

## 2025-06-02 RX ORDER — DIPHENHYDRAMINE HYDROCHLORIDE 50 MG/ML
50 INJECTION, SOLUTION INTRAMUSCULAR; INTRAVENOUS AS NEEDED
OUTPATIENT
Start: 2025-06-08

## 2025-06-02 RX ORDER — EPINEPHRINE 0.3 MG/.3ML
0.3 INJECTION SUBCUTANEOUS EVERY 5 MIN PRN
Status: DISCONTINUED | OUTPATIENT
Start: 2025-06-02 | End: 2025-06-02 | Stop reason: HOSPADM

## 2025-06-02 RX ORDER — HEPARIN 100 UNIT/ML
500 SYRINGE INTRAVENOUS AS NEEDED
OUTPATIENT
Start: 2025-06-02

## 2025-06-02 RX ORDER — DIPHENHYDRAMINE HYDROCHLORIDE 50 MG/ML
50 INJECTION, SOLUTION INTRAMUSCULAR; INTRAVENOUS AS NEEDED
Status: DISCONTINUED | OUTPATIENT
Start: 2025-06-02 | End: 2025-06-02 | Stop reason: HOSPADM

## 2025-06-02 RX ORDER — ZOLEDRONIC ACID 0.04 MG/ML
4 INJECTION, SOLUTION INTRAVENOUS ONCE
OUTPATIENT
Start: 2025-06-08

## 2025-06-02 RX ORDER — ALBUTEROL SULFATE 0.83 MG/ML
3 SOLUTION RESPIRATORY (INHALATION) AS NEEDED
OUTPATIENT
Start: 2025-06-08

## 2025-06-02 RX ORDER — EPINEPHRINE 0.3 MG/.3ML
0.3 INJECTION SUBCUTANEOUS EVERY 5 MIN PRN
OUTPATIENT
Start: 2025-06-08

## 2025-06-02 RX ORDER — EXEMESTANE 25 MG/1
25 TABLET ORAL DAILY
Qty: 90 TABLET | Refills: 3 | Status: SHIPPED | OUTPATIENT
Start: 2025-06-02 | End: 2026-06-02

## 2025-06-02 RX ORDER — HEPARIN SODIUM,PORCINE/PF 10 UNIT/ML
50 SYRINGE (ML) INTRAVENOUS AS NEEDED
Status: DISCONTINUED | OUTPATIENT
Start: 2025-06-02 | End: 2025-06-02 | Stop reason: HOSPADM

## 2025-06-02 RX ORDER — HEPARIN 100 UNIT/ML
500 SYRINGE INTRAVENOUS AS NEEDED
Status: DISCONTINUED | OUTPATIENT
Start: 2025-06-02 | End: 2025-06-02 | Stop reason: HOSPADM

## 2025-06-02 RX ADMIN — ZOLEDRONIC ACID 4 MG: 0.04 INJECTION, SOLUTION INTRAVENOUS at 10:53

## 2025-06-02 RX ADMIN — SODIUM CHLORIDE 500 ML: 0.9 INJECTION, SOLUTION INTRAVENOUS at 10:52

## 2025-06-02 ASSESSMENT — PAIN SCALES - GENERAL: PAINLEVEL_OUTOF10: 0-NO PAIN

## 2025-06-02 ASSESSMENT — PATIENT HEALTH QUESTIONNAIRE - PHQ9
SUM OF ALL RESPONSES TO PHQ9 QUESTIONS 1 & 2: 0
1. LITTLE INTEREST OR PLEASURE IN DOING THINGS: NOT AT ALL
2. FEELING DOWN, DEPRESSED OR HOPELESS: NOT AT ALL

## 2025-06-02 NOTE — PATIENT INSTRUCTIONS
Cindy SCHULTZ, CNP Dec 2025, can be the same 4th Zometa with labs same day prior to our apt at the [facility] on the 1st floor.      3rd Zometa today, planned #4 Dec 2025    Repeat DEXA Oct / Nov 2025- please schedule     Fozia Carranza CNP  radiation follow up 2/6/26- Hilaria Murcia RN with research will see you this day as well.      Dr Luis breast / Terrie Gloria CNP surgery follow up with mammogram 9/22/25     PCP Dr D'Amico annually and as needed 11/2025     Please call [phone] with any questions or concerns prior to your next office visit.

## 2025-06-02 NOTE — PROGRESS NOTES
Breast Medical Oncology Clinic  Location: [facility]      BREAST CANCER DIAGNOSIS and HISTORY  Malignant neoplasm of lower-outer quadrant of left breast of female, estrogen receptor positive, Pathologic: Stage IA (pT1c, pN1a(sn), cM0, G1, ER+, AL+, HER2-)   Patient has a history of benign breast biopsy on the right.     November 26, 2021: Bilateral screening mammogram showed scattered fibroglandular tissue with a left focal asymmetry, BI-RADS 0.     January 20, 2022: Mammogram and ultrasound of the left breast showed the asymmetry persisted.  It was 6 cm from the nipple and measured 5 x 3 x 5 mm BI-RADS Category 3.  Recommendation was for repeat mammogram and ultrasound in 6 months.     September 26, 2023: Patient presented with right nipple discharge and a bilateral mammogram showed scattered fibroglandular tissue with a left breast mass showing an interval change and associated distortion BI-RADS Category 0.  An ultrasound was recommended.     October 6 2023: Ultrasound of the right subareolar area showed no suspicious abnormality.  Ultrasound of the left mass at the 6 o'clock position 5 cm from the nipple showed a 0.8 x 0.6 x 0.8 cm irregular mass with no suspicious left axillary lymph nodes.     November 1, 2023: Biopsy of the left breast mass showed grade 1 invasive ductal carcinoma, ER/AL 95% positive, HER2/thomas negative and a biopsy marker was placed at the time of the biopsy.     December 13, 2023: Patient underwent a left lumpectomy and sentinel lymph node biopsy with Dr. Jocelin Luis, final pathology showed a 1.1 cm grade 1 invasive ductal carcinoma, margins negative, 1 out of 1 lymph nodes positive with 2.8 mm of disease with microscopic KING, pT1 cN1a.  Specimen submitted for Oncotype      January 15, 2024: Patient was seen by Dr. Ball in medical oncology. Recommended 5-7 years anastrozole + Adjuvant bisphosphonate therapy. Oncotype 13 March 13 2024 Completion of Radiation therapy with CCGT 1119     5/4/24  Initiated on Anastrozole.     2024 Change to Exemestane due to rash with anastrozole, Zometa         CURRENT THERAPY  Exemestane   Adjuvant zometa initiated on 24    HISTORY OF PRESENT ILLNESS    Betty Mendieta is a 69 y.o. woman who presents today for breast cancer follow up. Today I have reviewed with the patient I will be conducting a clinical physical breast exam. Patient has declined a second medical professional today during the exam as a chapperone.      She continues compliant on daily exemestane. She denies any new breast cancer concerns.     She denies any chest pain or breathing issues, no cough or sob.      She denies any vision changes or headache issues, dizziness, no falls. She reports age related balance issues.     She denies any new or unexplained bone aches or pains    She denies any skin lesions or masses, oral sores lesions or infections. She reports rare skin itching from exemestane and will use topical steroid cream that happens - not even happening weekly. Is using a few time per month.     She reports a normal appetite and normal bowel movements, normal urination     She denies any issues with sleep or fatigue        Review of Systems  ROS 14 points performed, See HPI for exceptions      Past Medical History:  has a past medical history of Asthma, Breast cancer, left breast, Cataract, Diverticulosis, Essential (primary) hypertension (10/26/2018), GERD (gastroesophageal reflux disease), Ocular hypertension, unspecified eye (2018), Personal history of irradiation, Preglaucoma, unspecified, unspecified eye, Radiculopathy, lumbar region (2016), Unilateral primary osteoarthritis, left knee (2021), and Vitamin D deficiency.  Surgical History:   has a past surgical history that includes  section, classic; Tubal ligation; Breast biopsy (Right); Breast biopsy (Left, 2023); Total knee arthroplasty (Left); Total knee arthroplasty (Right); BI US guided breast  "localization left (Left, 2023); and Breast lumpectomy.  Social History:   reports that she has never smoked. She has never used smokeless tobacco. She reports that she does not drink alcohol and does not use drugs.    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  She lives with her grandson Zac (is 22). She is  since .      GYN:  2 adult children in [place]     Family History:    Family History   Problem Relation Name Age of Onset    Hypertension Mother      Asthma Father      Hypertension Father      Glaucoma Sister      Hypertension Sister      Stroke Brother       Family Oncology History:  Cancer-related family history is not on file.      OBJECTIVE    VS / Pain:  /78   Pulse 92   Temp 36.3 °C (97.3 °F) (Temporal)   Resp 20   Ht 1.626 m (5' 4.02\")   Wt 95.8 kg (211 lb 4.8 oz)   SpO2 97%   BMI 36.25 kg/m²   BSA: 2.08 meters squared   Pain Scale: 0    Performance Status:  The ECOG performance scale today is ECO- Fully active, able to carry on all pre-disease performance w/o restriction.    Physical Exam  Constitutional: Well developed, awake/alert/oriented x4, no distress, alert and cooperative  EYES: Sclera clear  ENMT: mucous membranes moist, no apparent injury, no lesions seen  Head/Neck: Neck supple, no apparent injury, thyroid without mass or tenderness, No JVD, trachea midline, no bruits  Respiratory / Thoracic: Patent airways, clear to all lobes, normal breath sounds with good chest expansion, thorax symmetric.  Cardiovascular: Regular, rate and rhythm, no murmurs, 2+ equal pulses of the extremities, normal auscultated S 1and S 2  GI: Nondistended, soft, non-tender, no rebound tenderness or guarding, no masses palpable, no organomegaly, +BS, no bruits  Musculoskeletal: ROM intact, no joint swelling, normal strength, no spinal tenderness  Extremities: normal extremities, no cyanosis edema, contusions or wounds, no " clubbing  Neurological: alert and oriented x4, intact senses, motor, response and reflexes, normal strength  Breast: s/p left lumpectomy  and radiation therapy. Mild skin thickening and hyperpigmentation due to radiation therapy with improvement today. No palpable masses or lesions in either breast   Lymphatic: No cervical, supraclavicular, infraclavicular or axillary lymphadenopathy  Psychological: Appropriate and talkative mood and behavior  Skin: Warm and dry, no lesions, no rashes, no jaundice          Diagnostic Results   Study Result  Interpreted By: KALEB SIM MD  MRN: [id number]  Patient Name: CONNOR SMILEY    STUDY:  BONE DENSITY, DEXA 1 OR MORE SITES: AXIAL SKELETN9/25/2023 10:05 am    INDICATION:  screening. The patient is a 69 y/o year old F.    COMPARISON:  No comparison available.    ACCESSION NUMBER(S):  [id number]    ORDERING CLINICIAN:  RENZO BRYSON    TECHNIQUE:  BONE DENSITY, DEXA 1 OR MORE SITES: AXIAL SKELETN    FINDINGS:  SPINE L1-L4  Bone Mineral Density: 1.042  T-Score 0.0 Z-Score 1  Classification: Normal  Bone Mineral Density change vs baseline: Not reported  Bone Mineral Density change vs previous: Not reported    LEFT FEMUR -TOTAL  Bone Mineral Density: 0.807  T-Score -1.1 Z-Score -0.4  Classification: Osteopenia  Bone Mineral Density change vs baseline: Not reported  Bone Mineral Density change vs previous: Not reported    LEFT FEMUR -NECK  Bone Mineral Density: 0.648  T-Score -1.8 Z-Score -0.8  Classification: Osteopenia      World Health Organization (WHO) criteria for post-menopausal,   Women:  Normal: T-score at or above -1 SD  Osteopenia: T-score between -1 and -2.5 SD  Osteoporosis: T-score at or below -2.5 SD      10-year Fracture Risk:  Major Osteoporotic Fracture 4.1  Hip Fracture 0.5    Note: If no FRAX score is reported, it is because:  Some T-score for Spine Total or Hip Total or Femoral Neck at or below  -2.5    This exam was performed at [facility]  on a [facility] Dexa Unit.      IMPRESSION:  DEXA: According to World Health Organization criteria,  classification is  low bone mass (osteopenia)    Followup recommended in two years or sooner as clinically warranted.    All images and detailed analysis are available on the  Radiology  PACS.    MACRO:  None     Narrative & Impression   Interpreted By:  Donna Ramirez,   STUDY:  BI MAMMO BILATERAL DIAGNOSTIC TOMOSYNTHESIS;  9/10/2024 8:49 am      ACCESSION NUMBER(S):  [id number]      ORDERING CLINICIAN:  KANWAL GUERRA      INDICATION:  Left lumpectomy with radiation treatment. Remote benign excisional  right breast biopsy. 1st postoperative exam.      ,Z85.3 Personal history of malignant neoplasm of breast      COMPARISON:  Specimen x-ray 12/13/2023, bilateral mammograms 09/26/2023,  11/26/2021, 09/03/2019      FINDINGS:  MAMMOGRAPHY: 2D and tomosynthesis images were reviewed at 1 mm slice  thickness.      Density:  The breast tissue is heterogeneously dense, which may  obscure small masses.      New postsurgical scarring and surgical clips in the deep central  inferior left breast, the site of lumpectomy. New postsurgical  scarring overlying the left axilla. Moderate trabecular thickening  and skin thickening of the left breast consistent with radiation  treatment. Stable postsurgical scarring in the upper outer right  breast, remote site of benign breast biopsy. Stable bilateral round  and coarse calcifications. No suspicious masses or calcifications are  identified.          IMPRESSION:  New postoperative and postradiation changes, left breast. No  mammographic evidence of malignancy within either breast. Routine  postsurgical follow-up recommended.      BI-RADS CATEGORY:  BI-RADS Category:  2 Benign.  Recommendation:  Annual Screening.  Recommended Date:  1 Year.  Laterality:  Bilateral.         LABORATORY/PATHOLOGY DATA    Chemistry    Lab Results   Component Value Date/Time     06/02/2025 0912    K  4.0 06/02/2025 0912     06/02/2025 0912    CO2 28 06/02/2025 0912    BUN 11 06/02/2025 0912    CREATININE 0.78 06/02/2025 0912    Lab Results   Component Value Date/Time    CALCIUM 9.7 06/02/2025 0912    ALKPHOS 69 06/02/2025 0912    AST 18 06/02/2025 0912    ALT 15 06/02/2025 0912    BILITOT 0.4 06/02/2025 0912        Lab Results   Component Value Date    WBC 5.2 06/02/2025    HGB 12.4 06/02/2025    HCT 38.5 06/02/2025    MCV 93 06/02/2025     06/02/2025           IMPRESSION/PLAN    Left sided pT1cN1 ER/TX+ & HER2 neg low clinical risk/low molecular risk breast cancer.   Continued on daily exemestane, is tolerating this well. Rx being sent to [facility] for mail order due to cost, Nursing and SW helping pt to set up her account.     There is no evidence on clinical exam today for breast cancer recurrence. RTC Nov / Dec 2025, continued shared visits between myself in Med Onc, Breast Surgery and Rad onc on clinical trial     Moderate osteopenia w/jf T of -1.8 (left femur neck).  Initiated adjuvant bisphosphonate with Zometa July 2024 given she will need 5-7 yrs of AI. Planned repeat DEXA testing late Fall 2025      General Health maintenance   PCP Dr D'Amico annually and as needed       At least 25 minutes of direct consultation was spent with the patient today reviewing her cancer care plan, educating and answering questions regarding ongoing follow up, greater than 50% in counseling and coordination of care            Thank you for the opportunity to be involved your care.   We discussed the clinical significance of diagnosis, goals of care and treatment plan in detail.   Please do not hesitate to reach out with any questions.         -------------------------------------------------------------------------------------------------------------------------------  Cindy Wyman MSN, APRN, FNP-C  [facility]  Division of Medical Oncology- Breast   Collaborating Physician Dr. Rafal SILVA  [place]   [facility] Breast Disease [facility]   [facility]  [address] [place], [place] [id number]  Phone: [phone]  Fax: [phone]  Available via Secure Chat    Confidential Peer Review Document-  Privilege  Privileged Pursuant to [place] Revised Code Section 2305.24, .25, .251 & .252

## 2025-06-02 NOTE — PROGRESS NOTES
Betty Mendieta is a 69 y.o. female who presents in stable condition for zometa infusion    Since her last visit, she has been doing well.  Overall, she states her energy level is stable.  Her appetite & hydration status has been good.  She reports no complaints.  She has no other concerns.    Patient tolerated infusion well and has been educated with the overall therapy plan. Questions & concerns addressed by her provider during visit.   AVS, lab results & future appointment provided. Patient discharged in stable condition.    Reviewed and approved by KEVIN PATEL on 6/2/25 at 12:45 PM.

## 2025-06-02 NOTE — PROGRESS NOTES
Sw assisted patient in signing up for [facility] ([facility]).    Jean Duque LISW-S

## 2025-06-03 NOTE — RESULT ENCOUNTER NOTE
Creatinine kinase, the muscle enzyme, is still elevated, but has come down slightly.  I am not entirely sure what is causing it, but it is low-level elevation, not likely to be something like rhabdomyolysis.  Will continue to monitor.  If not improving, we will consider rheumatological blood work, or rheumatology referral.

## 2025-06-05 ENCOUNTER — TELEPHONE (OUTPATIENT)
Dept: PRIMARY CARE | Facility: CLINIC | Age: 70
End: 2025-06-05
Payer: MEDICARE

## 2025-06-05 NOTE — TELEPHONE ENCOUNTER
Result Communication    Resulted Orders   CK   Result Value Ref Range    Creatine Kinase 311 (H) 0 - 215 U/L       11:46 AM      Results were successfully communicated with the patient and they acknowledged their understanding.  Result Communication    Resulted Orders   CK   Result Value Ref Range    Creatine Kinase 311 (H) 0 - 215 U/L       11:46 AM      Results were successfully communicated with the patient and they acknowledged their understanding.

## 2025-06-05 NOTE — TELEPHONE ENCOUNTER
----- Message from Christopher D'Amico sent at 6/3/2025  3:33 PM EDT -----  Creatinine kinase, the muscle enzyme, is still elevated, but has come down slightly.  I am not entirely sure what is causing it, but it is low-level elevation, not likely to be something like   rhabdomyolysis.  Will continue to monitor.  If not improving, we will consider rheumatological blood work, or rheumatology referral.  ----- Message -----  From: Lab, Background User  Sent: 6/2/2025  12:27 PM EDT  To: Christopher D'Amico, DO

## 2025-06-10 ENCOUNTER — TELEPHONE (OUTPATIENT)
Dept: HEMATOLOGY/ONCOLOGY | Facility: CLINIC | Age: 70
End: 2025-06-10

## 2025-06-12 DIAGNOSIS — Z17.0 MALIGNANT NEOPLASM OF LOWER-OUTER QUADRANT OF LEFT BREAST OF FEMALE, ESTROGEN RECEPTOR POSITIVE: ICD-10-CM

## 2025-06-12 DIAGNOSIS — C50.512 MALIGNANT NEOPLASM OF LOWER-OUTER QUADRANT OF LEFT BREAST OF FEMALE, ESTROGEN RECEPTOR POSITIVE: ICD-10-CM

## 2025-06-12 RX ORDER — EXEMESTANE 25 MG/1
25 TABLET ORAL DAILY
Qty: 90 TABLET | Refills: 3 | Status: SHIPPED | OUTPATIENT
Start: 2025-06-12 | End: 2026-06-12

## 2025-06-18 ENCOUNTER — TELEPHONE (OUTPATIENT)
Dept: PRIMARY CARE | Facility: CLINIC | Age: 70
End: 2025-06-18
Payer: MEDICARE

## 2025-06-18 DIAGNOSIS — R74.8 ELEVATED CK: Primary | ICD-10-CM

## 2025-06-23 ENCOUNTER — TELEPHONE (OUTPATIENT)
Dept: HEMATOLOGY/ONCOLOGY | Facility: CLINIC | Age: 70
End: 2025-06-23

## 2025-06-23 NOTE — TELEPHONE ENCOUNTER
I called her back & gave her the ph# & told her to call them & tell them to remove the insurance & put it as pay out of pocket.  She said she did try to do that online but it wouldn't allow her but she's going to call & try to talk to someone. Thanks Terrell!

## 2025-06-23 NOTE — TELEPHONE ENCOUNTER
I called the pt & she said she hadn't heard anything from the pharmacy but was told her med would be $33.  She called the pharmacy ([facility]) & they told her she hadn't checked out so she signed on to check out received a response that they do not accept her insurance.  I told her Hilaria is out of the office until Wed 6/25 but I will ask Hilaria to call her.  She agreed to this plan.

## 2025-06-26 ENCOUNTER — SOCIAL WORK (OUTPATIENT)
Dept: CASE MANAGEMENT | Facility: HOSPITAL | Age: 70
End: 2025-06-26
Payer: MEDICARE

## 2025-06-26 NOTE — PROGRESS NOTES
Patient called after initially running into some challenges with [facility]. She finally was able to speak with someone at the pharmacy to walk her through the process and she was successful in being able to pay $33.00 for her Exemestane. It will be shipped to her. Patient is very grateful.    MAVIS Garcia

## 2025-07-07 ENCOUNTER — TELEPHONE (OUTPATIENT)
Dept: RADIATION ONCOLOGY | Facility: HOSPITAL | Age: 70
End: 2025-07-07

## 2025-07-07 NOTE — TELEPHONE ENCOUNTER
[facility] is calling in for refills of Betty's hydrocortisone cream. Pharmacy can be reached at [phone]

## 2025-07-17 DIAGNOSIS — L30.9 DERMATITIS: Primary | ICD-10-CM

## 2025-07-17 DIAGNOSIS — C50.512 MALIGNANT NEOPLASM OF LOWER-OUTER QUADRANT OF LEFT BREAST OF FEMALE, ESTROGEN RECEPTOR POSITIVE: ICD-10-CM

## 2025-07-17 DIAGNOSIS — Z17.0 MALIGNANT NEOPLASM OF LOWER-OUTER QUADRANT OF LEFT BREAST OF FEMALE, ESTROGEN RECEPTOR POSITIVE: ICD-10-CM

## 2025-07-17 RX ORDER — HYDROCORTISONE 25 MG/G
CREAM TOPICAL AS NEEDED
Qty: 28 G | Refills: 11 | Status: SHIPPED | OUTPATIENT
Start: 2025-07-17 | End: 2026-07-17

## 2025-07-23 DIAGNOSIS — H40.1131 PRIMARY OPEN ANGLE GLAUCOMA (POAG) OF BOTH EYES, MILD STAGE: ICD-10-CM

## 2025-07-23 DIAGNOSIS — H40.053 BILATERAL OCULAR HYPERTENSION: Primary | ICD-10-CM

## 2025-07-23 RX ORDER — LATANOPROST 50 UG/ML
1 SOLUTION/ DROPS OPHTHALMIC NIGHTLY
Qty: 7.5 ML | Refills: 3 | Status: SHIPPED | OUTPATIENT
Start: 2025-07-23 | End: 2025-07-24

## 2025-07-24 RX ORDER — LATANOPROST 50 UG/ML
1 SOLUTION/ DROPS OPHTHALMIC NIGHTLY
Qty: 7.5 ML | Refills: 3 | Status: SHIPPED | OUTPATIENT
Start: 2025-07-24

## 2025-08-05 DIAGNOSIS — J45.909 ASTHMA, UNSPECIFIED ASTHMA SEVERITY, UNSPECIFIED WHETHER COMPLICATED, UNSPECIFIED WHETHER PERSISTENT (HHS-HCC): ICD-10-CM

## 2025-08-05 RX ORDER — FLUTICASONE FUROATE, UMECLIDINIUM BROMIDE AND VILANTEROL TRIFENATATE 200; 62.5; 25 UG/1; UG/1; UG/1
1 POWDER RESPIRATORY (INHALATION) DAILY
Qty: 180 EACH | Refills: 1 | Status: SHIPPED | OUTPATIENT
Start: 2025-08-05

## 2025-08-06 DIAGNOSIS — I10 PRIMARY HYPERTENSION: ICD-10-CM

## 2025-08-06 RX ORDER — LOSARTAN POTASSIUM 100 MG/1
100 TABLET ORAL DAILY
Qty: 90 TABLET | Refills: 0 | Status: SHIPPED | OUTPATIENT
Start: 2025-08-06

## 2025-08-19 ENCOUNTER — APPOINTMENT (OUTPATIENT)
Dept: OPHTHALMOLOGY | Facility: CLINIC | Age: 70
End: 2025-08-19
Payer: MEDICARE

## 2025-08-25 ENCOUNTER — APPOINTMENT (OUTPATIENT)
Dept: RHEUMATOLOGY | Facility: CLINIC | Age: 70
End: 2025-08-25
Payer: MEDICARE

## 2025-08-25 VITALS
TEMPERATURE: 97.5 F | BODY MASS INDEX: 34.83 KG/M2 | SYSTOLIC BLOOD PRESSURE: 125 MMHG | HEIGHT: 64 IN | HEART RATE: 86 BPM | DIASTOLIC BLOOD PRESSURE: 79 MMHG | WEIGHT: 204 LBS

## 2025-08-25 DIAGNOSIS — M62.838 MUSCLE SPASM: ICD-10-CM

## 2025-08-25 DIAGNOSIS — I10 PRIMARY HYPERTENSION: ICD-10-CM

## 2025-08-25 PROCEDURE — 3074F SYST BP LT 130 MM HG: CPT

## 2025-08-25 PROCEDURE — 1159F MED LIST DOCD IN RCRD: CPT

## 2025-08-25 PROCEDURE — 3008F BODY MASS INDEX DOCD: CPT

## 2025-08-25 PROCEDURE — 3078F DIAST BP <80 MM HG: CPT

## 2025-08-25 PROCEDURE — 1036F TOBACCO NON-USER: CPT

## 2025-08-25 PROCEDURE — 1126F AMNT PAIN NOTED NONE PRSNT: CPT

## 2025-08-25 PROCEDURE — 99204 OFFICE O/P NEW MOD 45 MIN: CPT

## 2025-08-25 RX ORDER — TIZANIDINE 2 MG/1
2 TABLET ORAL EVERY 6 HOURS PRN
Qty: 30 TABLET | Refills: 3 | Status: SHIPPED | OUTPATIENT
Start: 2025-08-25 | End: 2025-09-24

## 2025-08-25 ASSESSMENT — PATIENT HEALTH QUESTIONNAIRE - PHQ9
2. FEELING DOWN, DEPRESSED OR HOPELESS: NOT AT ALL
SUM OF ALL RESPONSES TO PHQ9 QUESTIONS 1 & 2: 0
1. LITTLE INTEREST OR PLEASURE IN DOING THINGS: NOT AT ALL

## 2025-08-25 ASSESSMENT — PAIN SCALES - GENERAL: PAINLEVEL_OUTOF10: 0-NO PAIN

## 2025-08-26 RX ORDER — METOPROLOL TARTRATE 50 MG/1
50 TABLET ORAL 2 TIMES DAILY
Qty: 180 TABLET | Refills: 0 | Status: SHIPPED | OUTPATIENT
Start: 2025-08-26

## 2025-09-15 ENCOUNTER — APPOINTMENT (OUTPATIENT)
Dept: SURGICAL ONCOLOGY | Facility: HOSPITAL | Age: 70
End: 2025-09-15

## 2025-09-15 ENCOUNTER — APPOINTMENT (OUTPATIENT)
Dept: RADIOLOGY | Facility: HOSPITAL | Age: 70
End: 2025-09-15

## 2025-11-11 ENCOUNTER — APPOINTMENT (OUTPATIENT)
Dept: OPHTHALMOLOGY | Facility: CLINIC | Age: 70
End: 2025-11-11
Payer: MEDICARE

## 2025-11-20 ENCOUNTER — APPOINTMENT (OUTPATIENT)
Dept: PRIMARY CARE | Facility: CLINIC | Age: 70
End: 2025-11-20
Payer: MEDICARE

## (undated) DEVICE — COVER, MAYO STAND, W/PAD, 23 IN, DISPOSABLE, PLASTIC, LF, STERILE

## (undated) DEVICE — SUTURE, VICRYL, 3-0, 27 IN, SH

## (undated) DEVICE — STRIP, SKIN CLOSURE, STERI STRIP, REINFORCED, 0.5 X 4 IN

## (undated) DEVICE — DRESSING, TRANSPARENT, TEGADERM, 4 X 4-3/4 IN, NO LABEL

## (undated) DEVICE — SUTURE, SILK, 2-0, 30 IN, SH, BLACK

## (undated) DEVICE — SUTURE, VICRYL, 2-0, 27 IN, SH, UNDYED

## (undated) DEVICE — SUTURE, ETHILON, 3-0, 18 IN, PS1, BLACK

## (undated) DEVICE — DRESSING, DRAIN SPONGE, EXCILON AMD, 4X4 IN, 6 PLY, ST

## (undated) DEVICE — BANDAGE, ELASTIC, ACE, ACE, DOUBLE LENGTH, 6 X 550 IN, LF

## (undated) DEVICE — PACK, UNIVERSAL

## (undated) DEVICE — CLIP, LIGATING, LIGACLIP EXTRA, MEDIUM, TITANIUM, WHITE

## (undated) DEVICE — SLEEVE, VASO PRESS, CALF GARMENT, MEDIUM, GREEN

## (undated) DEVICE — STOCKINETTE, IMPERVIOUS, 9 X 36 IN, STERILE

## (undated) DEVICE — Device

## (undated) DEVICE — SUTURE, MONOCRYL, 4-0, 27 IN, PS-2, UNDYED